# Patient Record
Sex: FEMALE | Race: WHITE | NOT HISPANIC OR LATINO
[De-identification: names, ages, dates, MRNs, and addresses within clinical notes are randomized per-mention and may not be internally consistent; named-entity substitution may affect disease eponyms.]

---

## 2022-11-12 ENCOUNTER — NON-APPOINTMENT (OUTPATIENT)
Age: 77
End: 2022-11-12

## 2022-11-13 ENCOUNTER — INPATIENT (INPATIENT)
Facility: HOSPITAL | Age: 77
LOS: 11 days | Discharge: NURSING, REHAB OR LONG-TERM CARE | End: 2022-11-25
Attending: HOSPITALIST | Admitting: HOSPITALIST

## 2022-11-13 VITALS
HEART RATE: 105 BPM | RESPIRATION RATE: 18 BRPM | DIASTOLIC BLOOD PRESSURE: 63 MMHG | OXYGEN SATURATION: 98 % | TEMPERATURE: 100 F | SYSTOLIC BLOOD PRESSURE: 134 MMHG

## 2022-11-13 PROCEDURE — 99285 EMERGENCY DEPT VISIT HI MDM: CPT

## 2022-11-13 PROCEDURE — 93010 ELECTROCARDIOGRAM REPORT: CPT

## 2022-11-13 NOTE — ED ADULT TRIAGE NOTE - CHIEF COMPLAINT QUOTE
Pt c/o generalized weakness x 2 days. Pt was seen at Urgent Care today, tested positive for COVID. Denies chest pain or SOB. PMHx HTN DM

## 2022-11-14 DIAGNOSIS — R06.02 SHORTNESS OF BREATH: ICD-10-CM

## 2022-11-14 LAB
ALBUMIN SERPL ELPH-MCNC: 3.8 G/DL — SIGNIFICANT CHANGE UP (ref 3.3–5)
ALBUMIN SERPL ELPH-MCNC: 4.1 G/DL — SIGNIFICANT CHANGE UP (ref 3.3–5)
ALP SERPL-CCNC: 100 U/L — SIGNIFICANT CHANGE UP (ref 40–120)
ALP SERPL-CCNC: 97 U/L — SIGNIFICANT CHANGE UP (ref 40–120)
ALT FLD-CCNC: 19 U/L — SIGNIFICANT CHANGE UP (ref 4–33)
ALT FLD-CCNC: 25 U/L — SIGNIFICANT CHANGE UP (ref 4–33)
ANION GAP SERPL CALC-SCNC: 13 MMOL/L — SIGNIFICANT CHANGE UP (ref 7–14)
ANION GAP SERPL CALC-SCNC: 16 MMOL/L — HIGH (ref 7–14)
APPEARANCE UR: ABNORMAL
APTT BLD: 25.2 SEC — LOW (ref 27–36.3)
APTT BLD: 39.9 SEC — HIGH (ref 27–36.3)
APTT BLD: 44.9 SEC — HIGH (ref 27–36.3)
AST SERPL-CCNC: 27 U/L — SIGNIFICANT CHANGE UP (ref 4–32)
AST SERPL-CCNC: 44 U/L — HIGH (ref 4–32)
BACTERIA # UR AUTO: ABNORMAL
BASOPHILS # BLD AUTO: 0.04 K/UL — SIGNIFICANT CHANGE UP (ref 0–0.2)
BASOPHILS NFR BLD AUTO: 0.3 % — SIGNIFICANT CHANGE UP (ref 0–2)
BILIRUB DIRECT SERPL-MCNC: <0.2 MG/DL — SIGNIFICANT CHANGE UP (ref 0–0.3)
BILIRUB INDIRECT FLD-MCNC: >0.1 MG/DL — SIGNIFICANT CHANGE UP (ref 0–1)
BILIRUB SERPL-MCNC: 0.3 MG/DL — SIGNIFICANT CHANGE UP (ref 0.2–1.2)
BILIRUB SERPL-MCNC: 0.3 MG/DL — SIGNIFICANT CHANGE UP (ref 0.2–1.2)
BILIRUB UR-MCNC: NEGATIVE — SIGNIFICANT CHANGE UP
BUN SERPL-MCNC: 20 MG/DL — SIGNIFICANT CHANGE UP (ref 7–23)
BUN SERPL-MCNC: 20 MG/DL — SIGNIFICANT CHANGE UP (ref 7–23)
CALCIUM SERPL-MCNC: 9.1 MG/DL — SIGNIFICANT CHANGE UP (ref 8.4–10.5)
CALCIUM SERPL-MCNC: 9.8 MG/DL — SIGNIFICANT CHANGE UP (ref 8.4–10.5)
CHLORIDE SERPL-SCNC: 100 MMOL/L — SIGNIFICANT CHANGE UP (ref 98–107)
CHLORIDE SERPL-SCNC: 101 MMOL/L — SIGNIFICANT CHANGE UP (ref 98–107)
CK MB BLD-MCNC: 1.4 % — SIGNIFICANT CHANGE UP (ref 0–2.5)
CK MB CFR SERPL CALC: 4.6 NG/ML — SIGNIFICANT CHANGE UP
CK SERPL-CCNC: 329 U/L — HIGH (ref 25–170)
CK SERPL-CCNC: 686 U/L — HIGH (ref 25–170)
CO2 SERPL-SCNC: 16 MMOL/L — LOW (ref 22–31)
CO2 SERPL-SCNC: 22 MMOL/L — SIGNIFICANT CHANGE UP (ref 22–31)
COLOR SPEC: YELLOW — SIGNIFICANT CHANGE UP
CREAT SERPL-MCNC: 1.17 MG/DL — SIGNIFICANT CHANGE UP (ref 0.5–1.3)
CREAT SERPL-MCNC: 1.22 MG/DL — SIGNIFICANT CHANGE UP (ref 0.5–1.3)
CREAT SERPL-MCNC: 1.37 MG/DL — HIGH (ref 0.5–1.3)
D DIMER BLD IA.RAPID-MCNC: 416 NG/ML DDU — HIGH
DIFF PNL FLD: ABNORMAL
EGFR: 40 ML/MIN/1.73M2 — LOW
EGFR: 46 ML/MIN/1.73M2 — LOW
EGFR: 49 ML/MIN/1.73M2 — LOW
EOSINOPHIL # BLD AUTO: 0 K/UL — SIGNIFICANT CHANGE UP (ref 0–0.5)
EOSINOPHIL NFR BLD AUTO: 0 % — SIGNIFICANT CHANGE UP (ref 0–6)
EPI CELLS # UR: 12 /HPF — HIGH (ref 0–5)
FLUAV AG NPH QL: SIGNIFICANT CHANGE UP
FLUBV AG NPH QL: SIGNIFICANT CHANGE UP
GLUCOSE SERPL-MCNC: 146 MG/DL — HIGH (ref 70–99)
GLUCOSE SERPL-MCNC: 311 MG/DL — HIGH (ref 70–99)
GLUCOSE UR QL: NEGATIVE — SIGNIFICANT CHANGE UP
HCT VFR BLD CALC: 32.1 % — LOW (ref 34.5–45)
HCT VFR BLD CALC: 34.7 % — SIGNIFICANT CHANGE UP (ref 34.5–45)
HGB BLD-MCNC: 10.4 G/DL — LOW (ref 11.5–15.5)
HGB BLD-MCNC: 10.9 G/DL — LOW (ref 11.5–15.5)
HYALINE CASTS # UR AUTO: 0 /LPF — SIGNIFICANT CHANGE UP (ref 0–7)
IANC: 9.35 K/UL — HIGH (ref 1.8–7.4)
IMM GRANULOCYTES NFR BLD AUTO: 0.5 % — SIGNIFICANT CHANGE UP (ref 0–0.9)
INR BLD: 1.22 RATIO — HIGH (ref 0.88–1.16)
INR BLD: 1.26 RATIO — HIGH (ref 0.88–1.16)
KETONES UR-MCNC: NEGATIVE — SIGNIFICANT CHANGE UP
LEUKOCYTE ESTERASE UR-ACNC: ABNORMAL
LYMPHOCYTES # BLD AUTO: 1.53 K/UL — SIGNIFICANT CHANGE UP (ref 1–3.3)
LYMPHOCYTES # BLD AUTO: 12.7 % — LOW (ref 13–44)
MAGNESIUM SERPL-MCNC: 1.6 MG/DL — SIGNIFICANT CHANGE UP (ref 1.6–2.6)
MCHC RBC-ENTMCNC: 25.5 PG — LOW (ref 27–34)
MCHC RBC-ENTMCNC: 25.6 PG — LOW (ref 27–34)
MCHC RBC-ENTMCNC: 31.4 GM/DL — LOW (ref 32–36)
MCHC RBC-ENTMCNC: 32.4 GM/DL — SIGNIFICANT CHANGE UP (ref 32–36)
MCV RBC AUTO: 78.9 FL — LOW (ref 80–100)
MCV RBC AUTO: 81.3 FL — SIGNIFICANT CHANGE UP (ref 80–100)
MONOCYTES # BLD AUTO: 1.05 K/UL — HIGH (ref 0–0.9)
MONOCYTES NFR BLD AUTO: 8.7 % — SIGNIFICANT CHANGE UP (ref 2–14)
NEUTROPHILS # BLD AUTO: 9.35 K/UL — HIGH (ref 1.8–7.4)
NEUTROPHILS NFR BLD AUTO: 77.8 % — HIGH (ref 43–77)
NITRITE UR-MCNC: NEGATIVE — SIGNIFICANT CHANGE UP
NRBC # BLD: 0 /100 WBCS — SIGNIFICANT CHANGE UP (ref 0–0)
NRBC # BLD: 0 /100 WBCS — SIGNIFICANT CHANGE UP (ref 0–0)
NRBC # FLD: 0 K/UL — SIGNIFICANT CHANGE UP (ref 0–0)
NRBC # FLD: 0 K/UL — SIGNIFICANT CHANGE UP (ref 0–0)
PH UR: 6 — SIGNIFICANT CHANGE UP (ref 5–8)
PHOSPHATE SERPL-MCNC: 3 MG/DL — SIGNIFICANT CHANGE UP (ref 2.5–4.5)
PLATELET # BLD AUTO: 243 K/UL — SIGNIFICANT CHANGE UP (ref 150–400)
PLATELET # BLD AUTO: 280 K/UL — SIGNIFICANT CHANGE UP (ref 150–400)
POTASSIUM SERPL-MCNC: 3.7 MMOL/L — SIGNIFICANT CHANGE UP (ref 3.5–5.3)
POTASSIUM SERPL-MCNC: 3.9 MMOL/L — SIGNIFICANT CHANGE UP (ref 3.5–5.3)
POTASSIUM SERPL-SCNC: 3.7 MMOL/L — SIGNIFICANT CHANGE UP (ref 3.5–5.3)
POTASSIUM SERPL-SCNC: 3.9 MMOL/L — SIGNIFICANT CHANGE UP (ref 3.5–5.3)
PROT SERPL-MCNC: 7.6 G/DL — SIGNIFICANT CHANGE UP (ref 6–8.3)
PROT SERPL-MCNC: 7.7 G/DL — SIGNIFICANT CHANGE UP (ref 6–8.3)
PROT UR-MCNC: ABNORMAL
PROTHROM AB SERPL-ACNC: 14.2 SEC — HIGH (ref 10.5–13.4)
PROTHROM AB SERPL-ACNC: 14.6 SEC — HIGH (ref 10.5–13.4)
RBC # BLD: 4.07 M/UL — SIGNIFICANT CHANGE UP (ref 3.8–5.2)
RBC # BLD: 4.27 M/UL — SIGNIFICANT CHANGE UP (ref 3.8–5.2)
RBC # FLD: 16.6 % — HIGH (ref 10.3–14.5)
RBC # FLD: 17 % — HIGH (ref 10.3–14.5)
RBC CASTS # UR COMP ASSIST: 10 /HPF — HIGH (ref 0–4)
RSV RNA NPH QL NAA+NON-PROBE: SIGNIFICANT CHANGE UP
SARS-COV-2 RNA SPEC QL NAA+PROBE: DETECTED
SODIUM SERPL-SCNC: 133 MMOL/L — LOW (ref 135–145)
SODIUM SERPL-SCNC: 135 MMOL/L — SIGNIFICANT CHANGE UP (ref 135–145)
SP GR SPEC: 1.02 — SIGNIFICANT CHANGE UP (ref 1.01–1.05)
TROPONIN T, HIGH SENSITIVITY RESULT: 143 NG/L — CRITICAL HIGH
TROPONIN T, HIGH SENSITIVITY RESULT: 169 NG/L — CRITICAL HIGH
TROPONIN T, HIGH SENSITIVITY RESULT: 173 NG/L — CRITICAL HIGH
UROBILINOGEN FLD QL: SIGNIFICANT CHANGE UP
WBC # BLD: 10.47 K/UL — SIGNIFICANT CHANGE UP (ref 3.8–10.5)
WBC # BLD: 12.03 K/UL — HIGH (ref 3.8–10.5)
WBC # FLD AUTO: 10.47 K/UL — SIGNIFICANT CHANGE UP (ref 3.8–10.5)
WBC # FLD AUTO: 12.03 K/UL — HIGH (ref 3.8–10.5)
WBC UR QL: 30 /HPF — HIGH (ref 0–5)

## 2022-11-14 PROCEDURE — 99223 1ST HOSP IP/OBS HIGH 75: CPT

## 2022-11-14 PROCEDURE — 71045 X-RAY EXAM CHEST 1 VIEW: CPT | Mod: 26

## 2022-11-14 RX ORDER — REMDESIVIR 5 MG/ML
INJECTION INTRAVENOUS
Refills: 0 | Status: COMPLETED | OUTPATIENT
Start: 2022-11-14 | End: 2022-11-18

## 2022-11-14 RX ORDER — SODIUM CHLORIDE 9 MG/ML
1000 INJECTION, SOLUTION INTRAVENOUS
Refills: 0 | Status: DISCONTINUED | OUTPATIENT
Start: 2022-11-14 | End: 2022-11-25

## 2022-11-14 RX ORDER — ONDANSETRON 8 MG/1
4 TABLET, FILM COATED ORAL EVERY 8 HOURS
Refills: 0 | Status: DISCONTINUED | OUTPATIENT
Start: 2022-11-14 | End: 2022-11-25

## 2022-11-14 RX ORDER — HEPARIN SODIUM 5000 [USP'U]/ML
INJECTION INTRAVENOUS; SUBCUTANEOUS
Qty: 25000 | Refills: 0 | Status: DISCONTINUED | OUTPATIENT
Start: 2022-11-14 | End: 2022-11-15

## 2022-11-14 RX ORDER — INSULIN LISPRO 100/ML
VIAL (ML) SUBCUTANEOUS
Refills: 0 | Status: DISCONTINUED | OUTPATIENT
Start: 2022-11-14 | End: 2022-11-25

## 2022-11-14 RX ORDER — GLUCAGON INJECTION, SOLUTION 0.5 MG/.1ML
1 INJECTION, SOLUTION SUBCUTANEOUS ONCE
Refills: 0 | Status: DISCONTINUED | OUTPATIENT
Start: 2022-11-14 | End: 2022-11-25

## 2022-11-14 RX ORDER — INSULIN GLARGINE 100 [IU]/ML
5 INJECTION, SOLUTION SUBCUTANEOUS AT BEDTIME
Refills: 0 | Status: DISCONTINUED | OUTPATIENT
Start: 2022-11-14 | End: 2022-11-17

## 2022-11-14 RX ORDER — SODIUM CHLORIDE 9 MG/ML
1000 INJECTION INTRAMUSCULAR; INTRAVENOUS; SUBCUTANEOUS ONCE
Refills: 0 | Status: COMPLETED | OUTPATIENT
Start: 2022-11-14 | End: 2022-11-14

## 2022-11-14 RX ORDER — REMDESIVIR 5 MG/ML
200 INJECTION INTRAVENOUS EVERY 24 HOURS
Refills: 0 | Status: COMPLETED | OUTPATIENT
Start: 2022-11-14 | End: 2022-11-14

## 2022-11-14 RX ORDER — LANOLIN ALCOHOL/MO/W.PET/CERES
3 CREAM (GRAM) TOPICAL AT BEDTIME
Refills: 0 | Status: DISCONTINUED | OUTPATIENT
Start: 2022-11-14 | End: 2022-11-25

## 2022-11-14 RX ORDER — CHOLECALCIFEROL (VITAMIN D3) 125 MCG
1000 CAPSULE ORAL DAILY
Refills: 0 | Status: DISCONTINUED | OUTPATIENT
Start: 2022-11-14 | End: 2022-11-25

## 2022-11-14 RX ORDER — CHOLECALCIFEROL (VITAMIN D3) 125 MCG
1 CAPSULE ORAL
Qty: 0 | Refills: 0 | DISCHARGE

## 2022-11-14 RX ORDER — TRAZODONE HCL 50 MG
1 TABLET ORAL
Qty: 0 | Refills: 0 | DISCHARGE

## 2022-11-14 RX ORDER — HEPARIN SODIUM 5000 [USP'U]/ML
6000 INJECTION INTRAVENOUS; SUBCUTANEOUS EVERY 6 HOURS
Refills: 0 | Status: DISCONTINUED | OUTPATIENT
Start: 2022-11-14 | End: 2022-11-15

## 2022-11-14 RX ORDER — DEXTROSE 50 % IN WATER 50 %
25 SYRINGE (ML) INTRAVENOUS ONCE
Refills: 0 | Status: DISCONTINUED | OUTPATIENT
Start: 2022-11-14 | End: 2022-11-25

## 2022-11-14 RX ORDER — TRAZODONE HCL 50 MG
150 TABLET ORAL AT BEDTIME
Refills: 0 | Status: DISCONTINUED | OUTPATIENT
Start: 2022-11-14 | End: 2022-11-25

## 2022-11-14 RX ORDER — ACETAMINOPHEN 500 MG
650 TABLET ORAL ONCE
Refills: 0 | Status: COMPLETED | OUTPATIENT
Start: 2022-11-14 | End: 2022-11-14

## 2022-11-14 RX ORDER — ASPIRIN/CALCIUM CARB/MAGNESIUM 324 MG
81 TABLET ORAL DAILY
Refills: 0 | Status: DISCONTINUED | OUTPATIENT
Start: 2022-11-14 | End: 2022-11-25

## 2022-11-14 RX ORDER — ACETAMINOPHEN 500 MG
650 TABLET ORAL EVERY 6 HOURS
Refills: 0 | Status: DISCONTINUED | OUTPATIENT
Start: 2022-11-14 | End: 2022-11-25

## 2022-11-14 RX ORDER — REMDESIVIR 5 MG/ML
100 INJECTION INTRAVENOUS EVERY 24 HOURS
Refills: 0 | Status: COMPLETED | OUTPATIENT
Start: 2022-11-15 | End: 2022-11-18

## 2022-11-14 RX ORDER — TICAGRELOR 90 MG/1
180 TABLET ORAL ONCE
Refills: 0 | Status: COMPLETED | OUTPATIENT
Start: 2022-11-14 | End: 2022-11-14

## 2022-11-14 RX ORDER — DEXTROSE 50 % IN WATER 50 %
15 SYRINGE (ML) INTRAVENOUS ONCE
Refills: 0 | Status: DISCONTINUED | OUTPATIENT
Start: 2022-11-14 | End: 2022-11-25

## 2022-11-14 RX ORDER — ASPIRIN/CALCIUM CARB/MAGNESIUM 324 MG
1 TABLET ORAL
Qty: 0 | Refills: 0 | DISCHARGE

## 2022-11-14 RX ORDER — METFORMIN HYDROCHLORIDE 850 MG/1
1 TABLET ORAL
Qty: 0 | Refills: 0 | DISCHARGE

## 2022-11-14 RX ORDER — DULAGLUTIDE 4.5 MG/.5ML
1 INJECTION, SOLUTION SUBCUTANEOUS
Qty: 0 | Refills: 0 | DISCHARGE

## 2022-11-14 RX ORDER — HEPARIN SODIUM 5000 [USP'U]/ML
5000 INJECTION INTRAVENOUS; SUBCUTANEOUS ONCE
Refills: 0 | Status: COMPLETED | OUTPATIENT
Start: 2022-11-14 | End: 2022-11-14

## 2022-11-14 RX ORDER — ALBUTEROL 90 UG/1
2.5 AEROSOL, METERED ORAL ONCE
Refills: 0 | Status: COMPLETED | OUTPATIENT
Start: 2022-11-14 | End: 2022-11-14

## 2022-11-14 RX ORDER — ACETAMINOPHEN 500 MG
1000 TABLET ORAL ONCE
Refills: 0 | Status: COMPLETED | OUTPATIENT
Start: 2022-11-14 | End: 2022-11-14

## 2022-11-14 RX ORDER — CEFTRIAXONE 500 MG/1
1000 INJECTION, POWDER, FOR SOLUTION INTRAMUSCULAR; INTRAVENOUS ONCE
Refills: 0 | Status: COMPLETED | OUTPATIENT
Start: 2022-11-14 | End: 2022-11-14

## 2022-11-14 RX ORDER — DEXAMETHASONE 0.5 MG/5ML
6 ELIXIR ORAL DAILY
Refills: 0 | Status: DISCONTINUED | OUTPATIENT
Start: 2022-11-14 | End: 2022-11-14

## 2022-11-14 RX ORDER — DEXAMETHASONE 0.5 MG/5ML
6 ELIXIR ORAL DAILY
Refills: 0 | Status: COMPLETED | OUTPATIENT
Start: 2022-11-15 | End: 2022-11-24

## 2022-11-14 RX ORDER — DEXTROSE 50 % IN WATER 50 %
12.5 SYRINGE (ML) INTRAVENOUS ONCE
Refills: 0 | Status: DISCONTINUED | OUTPATIENT
Start: 2022-11-14 | End: 2022-11-25

## 2022-11-14 RX ORDER — GLIMEPIRIDE 1 MG
1 TABLET ORAL
Qty: 0 | Refills: 0 | DISCHARGE

## 2022-11-14 RX ORDER — IPRATROPIUM/ALBUTEROL SULFATE 18-103MCG
3 AEROSOL WITH ADAPTER (GRAM) INHALATION EVERY 6 HOURS
Refills: 0 | Status: DISCONTINUED | OUTPATIENT
Start: 2022-11-14 | End: 2022-11-16

## 2022-11-14 RX ORDER — ASPIRIN/CALCIUM CARB/MAGNESIUM 324 MG
324 TABLET ORAL ONCE
Refills: 0 | Status: COMPLETED | OUTPATIENT
Start: 2022-11-14 | End: 2022-11-14

## 2022-11-14 RX ADMIN — SODIUM CHLORIDE 1000 MILLILITER(S): 9 INJECTION INTRAMUSCULAR; INTRAVENOUS; SUBCUTANEOUS at 00:33

## 2022-11-14 RX ADMIN — Medication 100 MILLIGRAM(S): at 00:33

## 2022-11-14 RX ADMIN — ALBUTEROL 2.5 MILLIGRAM(S): 90 AEROSOL, METERED ORAL at 00:33

## 2022-11-14 RX ADMIN — Medication 3 MILLILITER(S): at 18:25

## 2022-11-14 RX ADMIN — HEPARIN SODIUM 1400 UNIT(S)/HR: 5000 INJECTION INTRAVENOUS; SUBCUTANEOUS at 19:35

## 2022-11-14 RX ADMIN — Medication 650 MILLIGRAM(S): at 00:33

## 2022-11-14 RX ADMIN — CEFTRIAXONE 100 MILLIGRAM(S): 500 INJECTION, POWDER, FOR SOLUTION INTRAMUSCULAR; INTRAVENOUS at 05:42

## 2022-11-14 RX ADMIN — HEPARIN SODIUM 5000 UNIT(S): 5000 INJECTION INTRAVENOUS; SUBCUTANEOUS at 05:38

## 2022-11-14 RX ADMIN — REMDESIVIR 200 MILLIGRAM(S): 5 INJECTION INTRAVENOUS at 14:23

## 2022-11-14 RX ADMIN — HEPARIN SODIUM 1000 UNIT(S)/HR: 5000 INJECTION INTRAVENOUS; SUBCUTANEOUS at 05:40

## 2022-11-14 RX ADMIN — Medication 6 MILLIGRAM(S): at 11:56

## 2022-11-14 RX ADMIN — TICAGRELOR 180 MILLIGRAM(S): 90 TABLET ORAL at 05:38

## 2022-11-14 RX ADMIN — Medication 3: at 18:23

## 2022-11-14 RX ADMIN — Medication 324 MILLIGRAM(S): at 03:10

## 2022-11-14 RX ADMIN — Medication 81 MILLIGRAM(S): at 11:56

## 2022-11-14 RX ADMIN — Medication 150 MILLIGRAM(S): at 22:25

## 2022-11-14 RX ADMIN — Medication 1200 MILLIGRAM(S): at 19:33

## 2022-11-14 RX ADMIN — INSULIN GLARGINE 5 UNIT(S): 100 INJECTION, SOLUTION SUBCUTANEOUS at 22:24

## 2022-11-14 RX ADMIN — Medication 400 MILLIGRAM(S): at 09:18

## 2022-11-14 RX ADMIN — HEPARIN SODIUM 1200 UNIT(S)/HR: 5000 INJECTION INTRAVENOUS; SUBCUTANEOUS at 12:16

## 2022-11-14 RX ADMIN — Medication 1000 UNIT(S): at 11:56

## 2022-11-14 RX ADMIN — Medication 2: at 12:44

## 2022-11-14 RX ADMIN — Medication 1200 MILLIGRAM(S): at 02:45

## 2022-11-14 RX ADMIN — Medication 650 MILLIGRAM(S): at 04:40

## 2022-11-14 NOTE — ED PROVIDER NOTE - OBJECTIVE STATEMENT
76 yo F pmhx htn hld dm2 p/w 2 days of fevers sore throat non productive cough a/w cp and sob. pt has multiple sick contacts at home flu+ . pt reporting chest pain middle of chest intermittent worse with coughing non exertional non pleuritic. pt having nasal congestion. took tylenol with no relief. went to  today + covid test. pt is vaccinated. pt reports drinking lots of fluid and urinary frequency. no abd pain n/v dysuria.

## 2022-11-14 NOTE — CONSULT NOTE ADULT - SUBJECTIVE AND OBJECTIVE BOX
[facility], Division of Infectious Diseases  SHOSHANA Chang, AWA Wilkinson, JEREMIAH Zambrano Michael   [phone]  after hours and weekends [phone]    YOSELYN, CECELIA  77y, Female  [id number]      HPI:  77 year old female, PMH of covid 19 4/2020, HTN, HLD, NIDDM, former smoker, quit 15 years ago, vaccinated for covid and boosted in the summer, presents to ER with symptoms of dizziness and weak almost fell down. her  had the flu and she started to feel congested, coughing and mild sob.   Per pt, she was in usual state of health up until  2 days ago. Last night, patient was taken to  and tested positive for Covid. She was told EKG was abnormal so she came to ED for evaluation.   pt has no current reports of chest pain but endorses weakness and feels like she may pass out, no syncopal episodes.      PMH/PSH--  diabetes  copd  hypertension      Allergies--  nkda    Medications--  Antibiotics: remdesivir  IVPB   IV Intermittent   remdesivir  IVPB 200 milliGRAM(s) IV Intermittent every 24 hours    Immunologic:   Other: acetaminophen     Tablet .. PRN  albuterol/ipratropium for Nebulization  aluminum hydroxide/magnesium hydroxide/simethicone Suspension PRN  aspirin enteric coated  cholecalciferol  dextrose 5%.  dextrose 5%.  dextrose 50% Injectable  dextrose 50% Injectable  dextrose 50% Injectable  dextrose Oral Gel PRN  glucagon  Injectable  guaiFENesin ER  heparin   Injectable PRN  heparin  Infusion.  insulin glargine Injectable (LANTUS)  insulin lispro (ADMELOG) corrective regimen sliding scale  melatonin PRN  ondansetron Injectable PRN  traZODone      Social History--  EtOH: denies ***  Tobacco: former   Drug Use: denies ***    Family/Marital History--            Travel/Environmental/Occupational History:  retired     Review of Systems:  REVIEW OF SYSTEMS  General: no fever, no chills, no wt loss	  Ophthalmologic: no blurry vision  Respiratory and Thorax: + cough, + dyspnea  Cardiovascular: no chest pain, no palpitations  Gastrointestinal:  no nausea, no vomiting, diarrhea  Genitourinary: no dysuria, no urgency, no frequency	  Musculoskeletal: no myalgias	  Neurological:  no headache	    Physical Exam--  Vital Signs: T(F): 98.9 (11-14-22 @ 11:37), Max: 99.9 (11-13-22 @ 23:03)  HR: 95 (11-14-22 @ 11:37)  BP: 136/63 (11-14-22 @ 11:37)  RR: 19 (11-14-22 @ 11:37)  SpO2: 99% (11-14-22 @ 11:37)  Wt(kg): --  General: Nontoxic-appearing Female in no acute distress.  HEENT: AT/NC.   Neck: Not rigid. No sense of mass.  Nodes: None palpable.  Lungs: Clear bilaterally without rales, wheezing or rhonchi  Heart: Regular rate and rhythm. No Murmur.  Abdomen: Bowel sounds present and normoactive. Soft. Nondistended. Nontender.   Extremities: No cyanosis or clubbing. trace edema.   Skin: Warm. Dry. Good turgor. No rash. No vasculitic stigmata.  Psychiatric: Appropriate affect and mood for situation.         Laboratory & Imaging Data--  CBC                        10.4   10.47 )-----------( 243      ( 14 Nov 2022 11:37 )             32.1       Chemistries  11-14    133<L>  |  101  |  20  ----------------------------<  311<H>  3.7   |  16<L>  |  1.22    Ca    9.1      14 Nov 2022 11:37  Phos  3.0     11-14  Mg     1.60     11-14    TPro  7.7  /  Alb  4.1  /  TBili  0.3  /  DBili  x   /  AST  27  /  ALT  19  /  AlkPhos  100  11-14      Culture Data    < from: Xray Chest 1 View- PORTABLE-Urgent (Xray Chest 1 View- PORTABLE-Urgent .) (11.14.22 @ 01:17) >    ACC: [id number] EXAM:  XR CHEST PORTABLE URGENT 1V                          PROCEDURE DATE:  11/14/2022          INTERPRETATION:  CLINICAL INFORMATION: Chest pain, cough.    TECHNIQUE: Single portable view of the chest.    COMPARISON: None available..    FINDINGS:    No focal consolidation.  No pneumothorax or large pleural effusion.  Heart size cannot be accurately assessed in this projection.  No acute osseous abnormality.    IMPRESSION:    No focal consolidation.    < end of copied text >

## 2022-11-14 NOTE — ED PROVIDER NOTE - CLINICAL SUMMARY MEDICAL DECISION MAKING FREE TEXT BOX
76 yo F pmhx htn hld dm2 p/w 2 days of fevers sore throat non productive cough a/w cp and sob. pt has multiple sick contacts at home flu+ . pt reporting chest pain middle of chest intermittent worse with coughing non exertional non pleuritic. pt having nasal congestion. took tylenol with no relief. went to  today + covid test. pt is vaccinated. pt reports drinking lots of fluid and urinary frequency. no abd pain n/v dysuria. VSS. benign exam lungs ctab. c/f uri vs covid vs uti. lower c/f for acs vs pna. plan labs ekg cxr ua meds and r/a

## 2022-11-14 NOTE — CONSULT NOTE ADULT - SUBJECTIVE AND OBJECTIVE BOX
Date of Admission:  11/13/22  CHIEF COMPLAINT:  feels weak since Saturday, tested positive for covid on Saturday  HISTORY OF PRESENT ILLNESS:  Patient is a 77 year old female, PMH of HTN, HLD, NIDDM, former smoker, quit 15 years ago, vaccinated for covid, presents to ER with symptoms of shortness of breath, cough, congestion, nausea, no vomitting, loose stool. Per pt, she was in usual state of health up until  2 days ago. Last night, patient was taken to  and tested positive for Covid. She was told EKG was abnormal so she came to ED for evaluation. Pt has no current reports of chest pain but endorses weakness and feels like she may pass out, no syncopal episodes. In ER, EKG with no signs of ACS. Cardiac enzymes and troponin are positive. Troponin 169, delta 173. CPK mildly elevated to 329, CKMB 4.6 index is 1.4.   Patient has multiple sick contacts at home,  is flu positive.    Cardiology consulted for possible NSTEMI in setting of covid.      Allergies  HMG-CoA reductase inhibitors (Unknown)    MEDICATIONS:  guaiFENesin ER 1200 milliGRAM(s) Oral every 12 hours    PAST MEDICAL & SURGICAL HISTORY:  HTN  HLD  NIDDM    FAMILY HISTORY:      SOCIAL HISTORY:    Former smoker, quit 1 yrs ago  [ ] Smoker  [ ] Alcohol    REVIEW OF SYSTEMS:    CONSTITUTIONAL: endorses weakness and dizziness  EYES/ENT: No visual changes;  No vertigo or throat pain   NECK: No pain or stiffness  RESPIRATORY:endorses cough and shortness of breath  CARDIOVASCULAR: No chest pain or palpitations  GASTROINTESTINAL: No abdominal or epigastric pain. No nausea, vomiting, or hematemesis; No diarrhea or constipation. No melena or hematochezia.  GENITOURINARY: No dysuria, frequency or hematuria  NEUROLOGICAL: No numbness or weakness  SKIN: No itching, rashes        T(C): 37.1 (11-14-22 @ 00:44), Max: 37.7 (11-13-22 @ 23:03)  HR: 103 (11-14-22 @ 00:44) (103 - 105)  BP: 141/73 (11-14-22 @ 00:44) (134/63 - 141/73)  RR: 16 (11-14-22 @ 00:44) (16 - 18)  SpO2: 100% (11-14-22 @ 00:44) (98% - 100%)  Wt(kg): --  I&O's Summary      Physical Exam:  General: NAD  Cardiovascular: Normal S1 S2, No JVD, No murmurs, No edema  Respiratory: Lungs clear to auscultation	  Gastrointestinal:  Soft, Non-tender, + BS	  Skin: warm and dry, No rashes, No ecchymoses, No cyanosis	  Extremities:  No clubbing, cyanosis or edema  Vascular: Peripheral pulses palpable 2+ bilaterally    LABS:	   	    CBC Full  -  ( 14 Nov 2022 00:35 )  WBC Count : 12.03 K/uL  Hemoglobin : 10.9 g/dL  Hematocrit : 34.7 %  Platelet Count - Automated : 280 K/uL  Mean Cell Volume : 81.3 fL  Mean Cell Hemoglobin : 25.5 pg  Mean Cell Hemoglobin Concentration : 31.4 gm/dL  Auto Neutrophil # : 9.35 K/uL  Auto Lymphocyte # : 1.53 K/uL  Auto Monocyte # : 1.05 K/uL  Auto Eosinophil # : 0.00 K/uL  Auto Basophil # : 0.04 K/uL  Auto Neutrophil % : 77.8 %  Auto Lymphocyte % : 12.7 %  Auto Monocyte % : 8.7 %  Auto Eosinophil % : 0.0 %  Auto Basophil % : 0.3 %    11-14    135  |  100  |  20  ----------------------------<  146<H>  3.9   |  22  |  1.37<H>    Ca    9.8      14 Nov 2022 00:35    TPro  7.7  /  Alb  4.1  /  TBili  0.3  /  DBili  x   /  AST  27  /  ALT  19  /  AlkPhos  100  11-14    DATE/TIME: 11/14 00:35  TROPONIN: 169  CPK   CKMB     DATE/TIME: 11/14 3:02  Troponin:  173  CPK: 329  CKMB: 4.6  Index: 1.4   Date of Admission:  11/13/22  CHIEF COMPLAINT:  feels weak since Saturday, tested positive for covid on Saturday  HISTORY OF PRESENT ILLNESS:  Patient is a 77 year old female, PMH of HTN, HLD, NIDDM, former smoker, quit 15 years ago, vaccinated for covid, presents to ER with symptoms of shortness of breath, cough, congestion, nausea, no vomitting, loose stool. Per pt, she was in usual state of health up until  2 days ago. Last night, patient was taken to  and tested positive for Covid. She was told EKG was abnormal so she came to ED for evaluation. Pt has no current reports of chest pain but endorses weakness and feels like she may pass out, no syncopal episodes. In ER, EKG with no signs of ACS. Cardiac enzymes and troponin are positive. Troponin 169, delta 173. CPK mildly elevated to 329, CKMB 4.6 index is 1.4.   Patient has multiple sick contacts at home,  is flu positive.    Cardiology consulted for possible NSTEMI in setting of covid.      Allergies  HMG-CoA reductase inhibitors (Unknown)    MEDICATIONS:  guaiFENesin ER 1200 milliGRAM(s) Oral every 12 hours  HOME MEDICATIONS:  aspirin 81mg po qd  trulicity  trazadone  metformin  glimperide  pravastatin  lisinopril    PAST MEDICAL & SURGICAL HISTORY:  HTN  HLD  NIDDM    FAMILY HISTORY:      SOCIAL HISTORY:    Former smoker, quit 1 yrs ago  [ ] Smoker  [ ] Alcohol    REVIEW OF SYSTEMS:    CONSTITUTIONAL: endorses weakness and dizziness  EYES/ENT: No visual changes;  No vertigo or throat pain   NECK: No pain or stiffness  RESPIRATORY:endorses cough and shortness of breath  CARDIOVASCULAR: No chest pain or palpitations  GASTROINTESTINAL: No abdominal or epigastric pain. No nausea, vomiting, or hematemesis; No diarrhea or constipation. No melena or hematochezia.  GENITOURINARY: No dysuria, frequency or hematuria  NEUROLOGICAL: No numbness or weakness  SKIN: No itching, rashes        T(C): 37.1 (11-14-22 @ 00:44), Max: 37.7 (11-13-22 @ 23:03)  HR: 103 (11-14-22 @ 00:44) (103 - 105)  BP: 141/73 (11-14-22 @ 00:44) (134/63 - 141/73)  RR: 16 (11-14-22 @ 00:44) (16 - 18)  SpO2: 100% (11-14-22 @ 00:44) (98% - 100%)  Wt(kg): --  I&O's Summary      Physical Exam:  General: NAD  Cardiovascular: Normal S1 S2, No JVD, No murmurs, No edema  Respiratory: Lungs clear to auscultation	  Gastrointestinal:  Soft, Non-tender, + BS	  Skin: warm and dry, No rashes, No ecchymoses, No cyanosis	  Extremities:  No clubbing, cyanosis or edema  Vascular: Peripheral pulses palpable 2+ bilaterally    LABS:	   	    CBC Full  -  ( 14 Nov 2022 00:35 )  WBC Count : 12.03 K/uL  Hemoglobin : 10.9 g/dL  Hematocrit : 34.7 %  Platelet Count - Automated : 280 K/uL  Mean Cell Volume : 81.3 fL  Mean Cell Hemoglobin : 25.5 pg  Mean Cell Hemoglobin Concentration : 31.4 gm/dL  Auto Neutrophil # : 9.35 K/uL  Auto Lymphocyte # : 1.53 K/uL  Auto Monocyte # : 1.05 K/uL  Auto Eosinophil # : 0.00 K/uL  Auto Basophil # : 0.04 K/uL  Auto Neutrophil % : 77.8 %  Auto Lymphocyte % : 12.7 %  Auto Monocyte % : 8.7 %  Auto Eosinophil % : 0.0 %  Auto Basophil % : 0.3 %    11-14    135  |  100  |  20  ----------------------------<  146<H>  3.9   |  22  |  1.37<H>    Ca    9.8      14 Nov 2022 00:35    TPro  7.7  /  Alb  4.1  /  TBili  0.3  /  DBili  x   /  AST  27  /  ALT  19  /  AlkPhos  100  11-14    DATE/TIME: 11/14 00:35  TROPONIN: 169  CPK   CKMB     DATE/TIME: 11/14 3:02  Troponin:  173  CPK: 329  CKMB: 4.6  Index: 1.4   Date of Admission:  11/13/22  CHIEF COMPLAINT:  feels weak since Saturday, tested positive for covid on Sunday  HISTORY OF PRESENT ILLNESS:  Patient is a 77 year old female, PMH of HTN, HLD, NIDDM, former smoker, quit 15 years ago, vaccinated for covid, presents to ER with symptoms of shortness of breath, cough, congestion, nausea, no vomitting, loose stool. Per pt, she was in usual state of health up until  2 days ago. Last night, patient was taken to  and tested positive for Covid. She was told EKG was abnormal so she came to ED for evaluation. Pt has no current reports of chest pain but endorses weakness and feels like she may pass out, no syncopal episodes. In ER, EKG with no signs of ACS. Cardiac enzymes and troponin are positive. Troponin 169, delta 173. CPK mildly elevated to 329, CKMB 4.6 index is 1.4.   Patient has multiple sick contacts at home,  is flu positive.    Cardiology consulted for possible NSTEMI in setting of covid.      Allergies  HMG-CoA reductase inhibitors (Unknown)    MEDICATIONS:  guaiFENesin ER 1200 milliGRAM(s) Oral every 12 hours  HOME MEDICATIONS:  aspirin 81mg po qd  trulicity  trazadone  metformin  glimperide  pravastatin  lisinopril    PAST MEDICAL & SURGICAL HISTORY:  HTN  HLD  NIDDM    FAMILY HISTORY:      SOCIAL HISTORY:    Former smoker, quit 1 yrs ago  [ ] Smoker  [ ] Alcohol    REVIEW OF SYSTEMS:    CONSTITUTIONAL: endorses weakness and dizziness  EYES/ENT: No visual changes;  No vertigo or throat pain   NECK: No pain or stiffness  RESPIRATORY:endorses cough and shortness of breath  CARDIOVASCULAR: No chest pain or palpitations  GASTROINTESTINAL: No abdominal or epigastric pain. No nausea, vomiting, or hematemesis; No diarrhea or constipation. No melena or hematochezia.  GENITOURINARY: No dysuria, frequency or hematuria  NEUROLOGICAL: No numbness or weakness  SKIN: No itching, rashes        T(C): 37.1 (11-14-22 @ 00:44), Max: 37.7 (11-13-22 @ 23:03)  HR: 103 (11-14-22 @ 00:44) (103 - 105)  BP: 141/73 (11-14-22 @ 00:44) (134/63 - 141/73)  RR: 16 (11-14-22 @ 00:44) (16 - 18)  SpO2: 100% (11-14-22 @ 00:44) (98% - 100%)  Wt(kg): --  I&O's Summary      Physical Exam:  General: NAD  Cardiovascular: Normal S1 S2, No JVD, No murmurs, No edema  Respiratory: Lungs clear to auscultation	  Gastrointestinal:  Soft, Non-tender, + BS	  Skin: warm and dry, No rashes, No ecchymoses, No cyanosis	  Extremities:  No clubbing, cyanosis or edema  Vascular: Peripheral pulses palpable 2+ bilaterally    LABS:	   	    CBC Full  -  ( 14 Nov 2022 00:35 )  WBC Count : 12.03 K/uL  Hemoglobin : 10.9 g/dL  Hematocrit : 34.7 %  Platelet Count - Automated : 280 K/uL  Mean Cell Volume : 81.3 fL  Mean Cell Hemoglobin : 25.5 pg  Mean Cell Hemoglobin Concentration : 31.4 gm/dL  Auto Neutrophil # : 9.35 K/uL  Auto Lymphocyte # : 1.53 K/uL  Auto Monocyte # : 1.05 K/uL  Auto Eosinophil # : 0.00 K/uL  Auto Basophil # : 0.04 K/uL  Auto Neutrophil % : 77.8 %  Auto Lymphocyte % : 12.7 %  Auto Monocyte % : 8.7 %  Auto Eosinophil % : 0.0 %  Auto Basophil % : 0.3 %    11-14    135  |  100  |  20  ----------------------------<  146<H>  3.9   |  22  |  1.37<H>    Ca    9.8      14 Nov 2022 00:35    TPro  7.7  /  Alb  4.1  /  TBili  0.3  /  DBili  x   /  AST  27  /  ALT  19  /  AlkPhos  100  11-14    DATE/TIME: 11/14 00:35  TROPONIN: 169  CPK   CKMB     DATE/TIME: 11/14 3:02  Troponin:  173  CPK: 329  CKMB: 4.6  Index: 1.4

## 2022-11-14 NOTE — ED PROVIDER NOTE - PROGRESS NOTE DETAILS
Treva PUENTE PGY2: elevated trop no ekg changes evaluated by cardiology recommends asa Brilinta and heparin and med admission with tele. spoke to [facility] who will admit pt.

## 2022-11-14 NOTE — ED ADULT NURSE REASSESSMENT NOTE - NS ED NURSE REASSESS COMMENT FT1
Report received from PM RN. Pt A&Ox4, ambulatory at baseline. Respirations equal and unlabored. Pt O2 sat 99% on RA.Pt states she is feeling weak and has chills. Pt afebrile. Pt denies CP, SOB, palpitations, N/V/D, any other medical complaints. Neuro intact. PERRLA. +2 pulses radial and pedal. Pt Sinus tachycardic on cardiac monitor. HR 115Abdomen soft, nontender, nondistended. Skin dry and intact. Pending inpatient bed assignment. Will continue to monitor.

## 2022-11-14 NOTE — CONSULT NOTE ADULT - ASSESSMENT
77 year old female, PMH of covid 19 4/2020, HTN, HLD, NIDDM, former smoker, quit 15 years ago, vaccinated for covid and boosted in the summer, presents to ER with symptoms of dizziness and weak almost fell down  covid 19  elevated troponin    plan  cxr no focal consolidation   pt at risk for progression of disease  remdesivir 3-5 day course trend lft  steroids not indicated as pt not hypoxic and still in first week of illness  ac heparin  antibx not indicated  trend biomarkers      ua with mild pyuria but also many epithelial cells >> contaminated specimen  hold off on antibx     cardiac monitoring

## 2022-11-14 NOTE — CONSULT NOTE ADULT - ASSESSMENT
Patient is a 77 year old female, PMH of HTN, HLD, NIDDM, former smoker, quit 15 years ago, covid +, now with EKG changes and elevated troponin and mildly elevated CPK, concern for NSTEMI.    PLAN:   Patient is a 77 year old female, PMH of HTN, HLD, NIDDM, former smoker, quit 15 years ago, covid +, now with EKG changes and elevated troponin and mildly elevated CPK, concern for NSTEMI.      IMPRESSION: NSTEMI in setting of covid    PLAN:  ELEVATED TROPONIN:  There are 3 EKGS in the chart, all 3 EKGs reviewed. EKGs do not suggest an acute plaque rupture, and are normal sinus, no ST elevations  -cardiac enzymes are positive  -patient does endorse midsternal chest pain with cough, likely from covid, but cannot rule out ACS  -aspirin 325 x 1 given  -please give brilinta load of 180mg  -initiate heparin gtt for ACS  -serial cardiac enzymes  -there is no indication for urgent left heart cardiac catheterization  -CXR shows mild pulmonary congestion, be cautious with IVF please  -ECHO in am   -added a serum pro BNP   -further cardiac work up to be determined  -this plan has been discussed with Dr. Campa, cardiology fellow  -cardiology will continue to follow    BLOOD PRESSURE:  BP is normal at this time  -please hold am dose of lisinopril in setting of mildly elevated creatinine of 1.37  -continue to monitor BP    OF note:  UA mildly positive and patient endorses some dysuria  -consider UTIx Patient is a 77 year old female, PMH of HTN, HLD, NIDDM, former smoker, quit 15 years ago, covid +, now with EKG changes and elevated troponin and mildly elevated CPK, concern for NSTEMI.      IMPRESSION: NSTEMI in setting of covid    PLAN:  ELEVATED TROPONIN:  There are 3 EKGS in the chart, all 3 EKGs reviewed. EKGs do not suggest an acute plaque rupture, and are normal sinus, no ST elevations  -cardiac enzymes are positive  -BJ score of 5  -CHELITA score 128  -patient does endorse midsternal chest pain with cough, likely from covid, but cannot rule out ACS  -aspirin 325 x 1 given  -please give brilinta load of 180mg  -initiate heparin gtt for ACS  -serial cardiac enzymes  -there is no indication for urgent left heart cardiac catheterization  -CXR shows mild pulmonary congestion, be cautious with IVF please  -ECHO in am   -added a serum pro BNP   -further cardiac work up will be needed  -this plan has been discussed with Dr. Campa, cardiology fellow  -cardiology will continue to follow    BLOOD PRESSURE:  BP is normal at this time  -please hold am dose of lisinopril in setting of mildly elevated creatinine of 1.37  -continue to monitor BP    OF note:  UA mildly positive and patient endorses some dysuria  -consider UTIx

## 2022-11-14 NOTE — H&P ADULT - HISTORY OF PRESENT ILLNESS
77 year old female, PMH of HTN, HLD, NIDDM, former smoker, quit 15 years ago, vaccinated for covid and boosted in the summer, presents to ER with symptoms of shortness of breath, cough, congestion, nausea, no vomiting, loose stool. Per pt, she was in usual state of health up until  2 days ago. Last night, patient was taken to  and tested positive for Covid. She was told EKG was abnormal so she came to ED for evaluation. Pt has no current reports of chest pain but endorses weakness and feels like she may pass out, no syncopal episodes. In ER, EKG with no signs of ACS. Cardiac enzymes and troponin are positive. Troponin 169, delta 173. CPK mildly elevated to 329, CKMB 4.6 index is 1.4.  Patient has multiple sick contacts at home,  is flu positive.  Cardiology consulted for possible NSTEMI in setting of covid. Start Remdesivir and Decadron. ID consulted.

## 2022-11-14 NOTE — H&P ADULT - NSHPPHYSICALEXAM_GEN_ALL_CORE
Vital Signs Last 24 Hrs  T(C): 37.2 (14 Nov 2022 11:37), Max: 37.7 (13 Nov 2022 23:03)  T(F): 98.9 (14 Nov 2022 11:37), Max: 99.9 (13 Nov 2022 23:03)  HR: 95 (14 Nov 2022 11:37) (95 - 115)  BP: 136/63 (14 Nov 2022 11:37) (119/61 - 141/84)  BP(mean): --  RR: 19 (14 Nov 2022 11:37) (16 - 20)  SpO2: 99% (14 Nov 2022 11:37) (98% - 100%)    Parameters below as of 14 Nov 2022 11:37  Patient On (Oxygen Delivery Method): nasal cannula  O2 Flow (L/min): 2      PHYSICAL EXAM:  GENERAL: NAD, well-developed, comfortable on nasal canula   HEAD:  Atraumatic, Normocephalic  EYES: EOMI, PERRLA, conjunctiva and sclera clear  NECK: Supple, No JVD  CHEST/LUNG: mild decrease breath sounds bilaterally; No wheeze   HEART: Regular rate and rhythm; No murmurs, rubs, or gallops  ABDOMEN: Soft, Nontender, Nondistended; Bowel sounds present  Neuro: AAOx3, no focal weakness, 5/5 b/l extremity strength  EXTREMITIES:  2+ Peripheral Pulses, No clubbing, cyanosis, or edema  SKIN: No rashes or lesions

## 2022-11-14 NOTE — H&P ADULT - NSHPLABSRESULTS_GEN_ALL_CORE
LABS:                        10.9   12.03 )-----------( 280      ( 2022 00:35 )             34.7     11-14    135  |  100  |  20  ----------------------------<  146<H>  3.9   |  22  |  1.37<H>    Ca    9.8      2022 00:35    TPro  7.7  /  Alb  4.1  /  TBili  0.3  /  DBili  x   /  AST  27  /  ALT  19  /  AlkPhos  100  11-14    PT/INR - ( 2022 03:02 )   PT: 14.6 sec;   INR: 1.26 ratio         PTT - ( 2022 03:02 )  PTT:25.2 sec  CAPILLARY BLOOD GLUCOSE      POCT Blood Glucose.: 147 mg/dL (2022 07:19)    CARDIAC MARKERS ( 2022 03:02 )  x     / x     / 329 U/L / x     / 4.6 ng/mL      Urinalysis Basic - ( 2022 01:10 )    Color: Yellow / Appearance: Turbid / S.018 / pH: x  Gluc: x / Ketone: Negative  / Bili: Negative / Urobili: <2 mg/dL   Blood: x / Protein: 30 mg/dL / Nitrite: Negative   Leuk Esterase: Large / RBC: 10 /HPF / WBC 30 /HPF   Sq Epi: x / Non Sq Epi: 12 /HPF / Bacteria: Few        RADIOLOGY & ADDITIONAL TESTS:    Imaging Personally Reviewed:  [x] YES  [ ] NO    Consultant(s) Notes Reviewed:  [x] YES  [ ] NO    Care Discussed with Consultants/Other Providers [x] YES  [ ] NO

## 2022-11-14 NOTE — ED ADULT NURSE REASSESSMENT NOTE - NS ED NURSE REASSESS COMMENT FT1
Pt A&Ox4, ambulatory with cane at baseline. Pt respirations equal and unlabored. Pt with SOB and dizziness on exertion. Pt resting in bed with family at bedside at this time. Pt tolerating 2L NC, O2 sat 100%. Medicated as EMR orders. Pt denies CP, palpitation, blurry vision, headache, N/V/D, any other medical complaints. Will continue to monitor.

## 2022-11-14 NOTE — ED PROVIDER NOTE - ATTENDING CONTRIBUTION TO CARE
HPI: 76 yo F with HTN, HLD, NIDDM that is vaccinated for covid that presents with covid like illness with SOB. Per pt, she was normal usual state of health 2 days ago. Last night started having symptoms of SOB, cough, congestion, nausea but no vomiting, and today was taken to  and was tested positive for Covid, at that time had some loose non bloody stools but was told EKG was abnormal so came to ED for eval. Pt has no chest pain but felt weak and felt like she was going to pass out but did not have syncopal episode. Non smoker. quit 15 years ago. NO travel, or known sick contacts. Denies any leg swelling.  EXAM: NAD, speaking full sentences, heart RRR, lungs ctab, BLE without edema.   MDM: pt with multiple medical problems that presents with covid like symptoms x 1 day. Also had weakness but no syncopal episode. EKG shows NSR despite concerns at  but they do not have a copy with them. Pt has no current chest pain. WIll place on monitor. concern for COvid likely, vs PNA but unlikely ACS vs CHF. WIll obtain labs/imaging, provide meds and reassess. HPI: 76 yo F with HTN, HLD, NIDDM that is vaccinated for covid that presents with covid like illness with SOB. Per pt, she was normal usual state of health 2 days ago. Last night started having symptoms of SOB, cough, congestion, nausea but no vomiting, and today was taken to  and was tested positive for Covid, at that time had some loose non bloody stools but was told EKG was abnormal so came to ED for eval. Pt has no chest pain but felt weak and felt like she was going to pass out but did not have syncopal episode. Non smoker. quit 15 years ago. NO travel, or known sick contacts. Denies any leg swelling.  EXAM: NAD, speaking full sentences, heart RRR, lungs ctab, BLE without edema.   MDM: pt with multiple medical problems that presents with covid like symptoms x 1 day. Also had weakness but no syncopal episode. EKG shows NSR despite concerns at  but they do not have a copy with them. Pt has no current chest pain. WIll place on monitor. concern for COvid likely, vs PNA but unlikely ACS vs CHF. WIll obtain labs/imaging, provide meds and reassess..

## 2022-11-14 NOTE — ED ADULT NURSE REASSESSMENT NOTE - HEART RATE (BEATS/MIN)
[FreeTextEntry1] : - Education, counseling, and support provided\par - Dementia diagnosis and progression discussed \par - Medications reviewed with CG, pharmacy team, and reconciled\par - Concern about pt taking insulin unassisted - would benefit from inc supervision for safety - cynthia insulin d/t high risk for errors\par - Caution with meds - including for urologic d/o d/t inc risk for side effects \par - Adv regularly stimulating activity - including cognitive and physical, and regular socialization\par - ADC program discussed and reading material about program provided \par - Referred to  for additional counseling, education, support, resources\par - Would benefit from inc supervision for safety and help with ADLs, and to minimimze CG burden\par \par f/u with neuro re: rivastigmine - unclear whether benefits>risks, may benefit from minimizing polypharmacy, defer to neuro if wish to trial\par \par Adv f/u for repeat sleep study\par Fall precautions\par Aspiration precautions - pt not adhering, adv to encourage\par \par cog/further mood eval at next visit \par \par Wife to bring in previously completed ACP documents for review\par \par f/u with DCS/NP Hina when able advised \par \par Rest as per PMD\par \par f/u with me PRN\par  115

## 2022-11-14 NOTE — CONSULT NOTE ADULT - ASSESSMENT
77 year old female, PMH of HTN, HLD, NIDDM, former smoker, quit 15 years ago, vaccinated for covid and boosted in the summer, presents to ER with symptoms of shortness of breath, cough, congestion, nausea, no vomiting, loose stool. Per pt, she was in usual state of health up until  2 days ago. Last night, patient was taken to  and tested positive for Covid. She was told EKG was abnormal so she came to ED for evaluation. Pt has no current reports of chest pain but endorses weakness and feels like she may pass out, no syncopal episodes. In ER, EKG with no signs of ACS. Cardiac enzymes and troponin are positive. Troponin 169, delta 173. CPK mildly elevated to 329, CKMB 4.6 index is 1.4.  Patient has multiple sick contacts at home,  is flu positive.  Cardiology consulted for possible NSTEMI in setting of covid. Start Remdesivir and Decadron. ID consulted.     Acute respiratory failure with hypoxia due to Covid19  Chest pain, elevated  trop  Diabetes  Hypertension  Acute kidney failure  DVT ppx      11/14/2022:    Acute respiratory failure with hypoxia due to Covid19: she is using 2 L of oxygen : agree with remdesivir as well as dexa: mantain o2 sao2 above 935 all the time:  check d dimer: ordered:  Follow LFTs and renal profile while on remdesivir:   Chest pain, elevated  trop : ? demand ischemia:  would defer to cards / PMD   Diabetes : monitor and control on dexa:   Hypertension ; controlled  Acute kidney failure : creat is downtrending:  cont to follow   DVT ppx    dw acp

## 2022-11-14 NOTE — ED ADULT NURSE NOTE - OBJECTIVE STATEMENT
Patient presents to the ED A&04 ambulatory at baseline coming in complaining of generalized weakness, non radiating chest heaviness x2 days. Patient also endors Patient presents to the ED A&04 ambulatory at baseline coming in complaining of SOB, generalized weakness, non radiating chest heaviness x2 days. Patient tested positive for covid at home.  Patient also states she had a presyncopal fall, denies LOC, blood thinner use and head trauma. respiration even, unlabored, ABD is soft, non tender, non distended with normal active bowel sounds No complaints of chest pain, headache, nausea, vomiting  fever, chills verbalized. 20GRAC in place, sinus tachy on cardiac monitor. medicated as ordered.

## 2022-11-14 NOTE — CONSULT NOTE ADULT - NS ATTEND AMEND GEN_ALL_CORE FT
mildly elevated hs-troponin in the setting of decreased renal function and COVID-19 infection. ECG without acute ST changes.  not ACS and can hold ACS-specific treatments  check third troponin to ensure stable

## 2022-11-14 NOTE — ED ADULT NURSE REASSESSMENT NOTE - NS ED NURSE REASSESS COMMENT FT1
Received report from sharri RN. cardiology at bedside. Pt is resting comfortably, offers no complaints. VS as noted. RR even and unlabored. Awaiting further orders from provider. Safety measures in place. Received report from sharri RN. cardiology at bedside. Pt is resting comfortably, offers no complaints. VS as noted. RR even and unlabored. NSR on monitor, satting 100% room air.  Awaiting further orders from provider. Safety measures in place.

## 2022-11-14 NOTE — H&P ADULT - ASSESSMENT
77 year old female, PMH of HTN, HLD, NIDDM, former smoker, quit 15 years ago, vaccinated for covid and boosted in the summer, presents to ER with symptoms of shortness of breath, cough, congestion, nausea, no vomiting, loose stool. Per pt, she was in usual state of health up until  2 days ago. Last night, patient was taken to  and tested positive for Covid. She was told EKG was abnormal so she came to ED for evaluation. Pt has no current reports of chest pain but endorses weakness and feels like she may pass out, no syncopal episodes. In ER, EKG with no signs of ACS. Cardiac enzymes and troponin are positive. Troponin 169, delta 173. CPK mildly elevated to 329, CKMB 4.6 index is 1.4.  Patient has multiple sick contacts at home,  is flu positive.  Cardiology consulted for possible NSTEMI in setting of covid. Start Remdesivir and Decadron. ID consulted.     Acute respiratory failure with hypoxia due to Covid19  - vaccinated and boosted  - given risk factors, start IV Remdesivir and decadron  - risk benefits discussed  - monitor inflammatory markers  -  CXR no consolidation  - wean nasal canula O2 as tolerates  - DuoNebs    Chest pain, elevated  trop  - likely demand ischemia per cardiology  - check 3rd set of trop  - c/w Asa, hep gtt (s/p Brilinta loaded x 1)  (takes Pravastatin at home). Intolerance to other statins? listed as allergy  - check TTE    Diabetes  - hold home metformin and Glimiperide  - hgbA1c  - Insulin Lantus 5 units  - sliding scale. can add premeal humalog if needed    Hypertension  - BP stable  - hold lisinopril given mildly elevated Cr    Acute kidney failure  - unclear baseline  - will review outpt EMR  - hold ACEI for now.     DVT ppx

## 2022-11-14 NOTE — H&P ADULT - NSHPADDITIONALINFOADULT_GEN_ALL_CORE
d/w pt and the daughter at bedside.    Pt of PCP Dr. Sonia Nickerson at [facility].     - Dr. JOE Padilla ([facility] [place])  - [phone]

## 2022-11-14 NOTE — CONSULT NOTE ADULT - SUBJECTIVE AND OBJECTIVE BOX
11-14-22 @ 13:48    Patient is a 77y old  Female who presents with a chief complaint of chest pain in the middle of the chest with worsening cough, concern for NSTEMI (2022 04:12)      HPI:  77 year old female, PMH of HTN, HLD, NIDDM, former smoker, quit 15 years ago, vaccinated for covid and boosted in the summer, presents to ER with symptoms of shortness of breath, cough, congestion, nausea, no vomiting, loose stool. Per pt, she was in usual state of health up until  2 days ago. Last night, patient was taken to  and tested positive for Covid. She was told EKG was abnormal so she came to ED for evaluation. Pt has no current reports of chest pain but endorses weakness and feels like she may pass out, no syncopal episodes. In ER, EKG with no signs of ACS. Cardiac enzymes and troponin are positive. Troponin 169, delta 173. CPK mildly elevated to 329, CKMB 4.6 index is 1.4.  Patient has multiple sick contacts at home,  is flu positive.  Cardiology consulted for possible NSTEMI in setting of covid. Start Remdesivir and Decadron. ID consulted.    (2022 11:37)     she hasno underlying lung disease:   she is an e x smoker;   no wheezing : but feels pressure in the chest  :  she says she has been sick for few days as her  had flu and she thought she had flu too : but when she got worse and went to :  , they diagnosed her with covid infection and hence sent to hospital  currently her daughter is at beside:   pt feels relatively OK:      dry cough :      ?FOLLOWING PRESENT  [x ] Hx of PE/DVT, x[ ] Hx COPD, [x ] Hx of Asthma, [ x] Hx of Hospitalization, [x ]  Hx of BiPAP/CPAP use, [ x] Hx of NESTOR    Allergies    HMG-CoA reductase inhibitors (Unknown)    Intolerances        PAST MEDICAL & SURGICAL HISTORY:      FAMILY HISTORY:      Social History: [  former smopker ] TOBACCO                  [daily drinks  ] ETOH                                 [ x ] IVDA/DRUGS    REVIEW OF SYSTEMS      General:	x    Skin/Breast:x  	x  Ophthalmologic:  	  ENMT:	x    Respiratory and Thorax: sob, cough with chest pressure   	  Cardiovascular:	x    Gastrointestinal:	x    Genitourinary:	x    Musculoskeletal:	x    Neurological:	x    Psychiatric:	x    Hematology/Lymphatics:	x    Endocrine:	x    Allergic/Immunologic:x	    MEDICATIONS  (STANDING):  albuterol/ipratropium for Nebulization 3 milliLiter(s) Nebulizer every 6 hours  aspirin enteric coated 81 milliGRAM(s) Oral daily  cholecalciferol 1000 Unit(s) Oral daily  dextrose 5%. 1000 milliLiter(s) (100 mL/Hr) IV Continuous <Continuous>  dextrose 5%. 1000 milliLiter(s) (50 mL/Hr) IV Continuous <Continuous>  dextrose 50% Injectable 25 Gram(s) IV Push once  dextrose 50% Injectable 12.5 Gram(s) IV Push once  dextrose 50% Injectable 25 Gram(s) IV Push once  glucagon  Injectable 1 milliGRAM(s) IntraMuscular once  guaiFENesin ER 1200 milliGRAM(s) Oral every 12 hours  heparin  Infusion.  Unit(s)/Hr (10 mL/Hr) IV Continuous <Continuous>  insulin glargine Injectable (LANTUS) 5 Unit(s) SubCutaneous at bedtime  insulin lispro (ADMELOG) corrective regimen sliding scale   SubCutaneous three times a day before meals  remdesivir  IVPB   IV Intermittent   remdesivir  IVPB 200 milliGRAM(s) IV Intermittent every 24 hours  traZODone 150 milliGRAM(s) Oral at bedtime    MEDICATIONS  (PRN):  acetaminophen     Tablet .. 650 milliGRAM(s) Oral every 6 hours PRN Temp greater or equal to 38C (100.4F), Mild Pain (1 - 3)  aluminum hydroxide/magnesium hydroxide/simethicone Suspension 30 milliLiter(s) Oral every 4 hours PRN Dyspepsia  dextrose Oral Gel 15 Gram(s) Oral once PRN Blood Glucose LESS THAN 70 milliGRAM(s)/deciliter  heparin   Injectable 6000 Unit(s) IV Push every 6 hours PRN For aPTT less than 40  melatonin 3 milliGRAM(s) Oral at bedtime PRN Insomnia  ondansetron Injectable 4 milliGRAM(s) IV Push every 8 hours PRN Nausea and/or Vomiting       Vital Signs Last 24 Hrs  T(C): 37.2 (2022 11:37), Max: 37.7 (2022 23:03)  T(F): 98.9 (2022 11:37), Max: 99.9 (2022 23:03)  HR: 95 (2022 11:37) (95 - 115)  BP: 136/63 (2022 11:37) (119/61 - 141/84)  BP(mean): --  RR: 19 (2022 11:37) (16 - 20)  SpO2: 99% (2022 11:37) (98% - 100%)    Parameters below as of 2022 11:37  Patient On (Oxygen Delivery Method): nasal cannula  O2 Flow (L/min): 2  Orthostatic VS          I&O's Summary      Physical Exam:   GENERAL: NAD, well-groomed, well-developed  HEENT: TABITHA/   Atraumatic, Normocephalic  ENMT: No tonsillar erythema, exudates, or enlargement; Moist mucous membranes, Good dentition, No lesions  NECK: Supple, No JVD, Normal thyroid  CHEST/LUNG: Clear to auscultation bilaterally; No rales, rhonchi, wheezing, or rubs  CVS: Regular rate and rhythm; No murmurs, rubs, or gallops  GI: : Soft, Nontender, Nondistended; Bowel sounds present  NERVOUS SYSTEM:  Alert & Oriented X3, Good concentration; Motor Strength 5/5 B/L upper and lower extremities; DTRs 2+ intact and symmetric  EXTREMITIES:  2+ Peripheral Pulses, No clubbing, cyanosis, or edema  LYMPH: No lymphadenopathy noted  SKIN: No rashes or lesions  ENDOCRINOLOGY: No Thyromegaly  PSYCH: Appropriate    Labs:    CARDIAC MARKERS ( 2022 11:37 )  x     / x     / 686 U/L / x     / x      CARDIAC MARKERS ( 2022 03:02 )  x     / x     / 329 U/L / x     / 4.6 ng/mL                            10.4   10.47 )-----------( 243      ( 2022 11:37 )             32.1                         10.9   12.03 )-----------( 280      ( 2022 00:35 )             34.7         133<L>  |  101  |  20  ----------------------------<  311<H>  3.7   |  16<L>  |  1.22      135  |  100  |  20  ----------------------------<  146<H>  3.9   |  22  |  1.37<H>    Ca    9.1      2022 11:37  Ca    9.8      2022 00:35  Phos  3.0       Mg     1.60         TPro  7.7  /  Alb  4.1  /  TBili  0.3  /  DBili  x   /  AST  27  /  ALT  19  /  AlkPhos  100      CAPILLARY BLOOD GLUCOSE      POCT Blood Glucose.: 217 mg/dL (2022 12:18)  POCT Blood Glucose.: 147 mg/dL (2022 07:19)    LIVER FUNCTIONS - ( 2022 00:35 )  Alb: 4.1 g/dL / Pro: 7.7 g/dL / ALK PHOS: 100 U/L / ALT: 19 U/L / AST: 27 U/L / GGT: x           PT/INR - ( 2022 03:02 )   PT: 14.6 sec;   INR: 1.26 ratio         PTT - ( 2022 11:37 )  PTT:44.9 sec  Urinalysis Basic - ( 2022 01:10 )    Color: Yellow / Appearance: Turbid / S.018 / pH: x  Gluc: x / Ketone: Negative  / Bili: Negative / Urobili: <2 mg/dL   Blood: x / Protein: 30 mg/dL / Nitrite: Negative   Leuk Esterase: Large / RBC: 10 /HPF / WBC 30 /HPF   Sq Epi: x / Non Sq Epi: 12 /HPF / Bacteria: Few      D DImer  Serum Pro-Brain Natriuretic Peptide: 2689 pg/mL ( @ 03:02)      Studies  Chest X-RAY  CT SCAN Chest   CT Abdomen  Venous Dopplers: LE:   Others        rad< from: Xray Chest 1 View- PORTABLE-Urgent (Xray Chest 1 View- PORTABLE-Urgent .) (22 @ 01:17) >    COMPARISON: None available..    FINDINGS:    No focal consolidation.  No pneumothorax or large pleural effusion.  Heart size cannot be accurately assessed in this projection.  No acute osseous abnormality.    IMPRESSION:    No focal consolidation.    --- End of Report ---          ANTWAN BHAGAT MD; Resident Radiologist  This document has been electronically signed.  KARAN ROACH MD; Attending Radiologist  This document has been electronically signed. 2022  5:4    < end of copied text >  < from: CT Chest w/o Cont (09 @ 02:04) >    Vascular calcification is noted. There is no evidence of aortic aneurysm.    Spinal degenerative changes are noted.    Please refer to the abdominal and pelvic  CT report for evaluation of the   relevant structures.        IMPRESSION:    Large Left lower lobe consolidation with trace effusion is likely   infectious in etiology.    Mild emphysema.    Findings discussed with Dr. Kumar on 2009 at 3:10 a.m.  with read   back.          ISAURO HARRIS M.D., RESIDENT RADIOLOGIST    TAQUERIA BISHOP M.D. ATTENDING RADIOLOGIST  This examination was interpreted on:  2009  9:07A.  This document   has been electronically signed. 2009 12:34P.    < end of copied text >

## 2022-11-14 NOTE — H&P ADULT - NSHPREVIEWOFSYSTEMS_GEN_ALL_CORE
REVIEW OF SYSTEMS:  General: no weakness, + fever/chills, no weight loss/gain  Skin/Breast: no rash, no jaundice  Ophthalmologic: no vision changes, no dry eyes   Respiratory and Thorax: + cough, no wheezing, no hemoptysis, + dyspnea  Cardiovascular: no chest pain, no shortness of breath, no orthopnea  Gastrointestinal: no n/v/d, no abdominal pain, no dysphagia   Genitourinary: no dysuria, no frequency, no nocturia, no hematuria  Musculoskeletal: no trauma, no sprain/strain, no myalgias, no arthralgias, no fracture  Neurological: no HA, no dizziness, no weakness, no numbness  Psychiatric: no depression, no SI/HI  Hematology/Lymphatics: no easy bruising  Endocrine: no heat or cold intolerance. no weight gain or loss  Allergic/Immunologic: no allergy or recent reaction

## 2022-11-15 LAB
A1C WITH ESTIMATED AVERAGE GLUCOSE RESULT: 7.7 % — HIGH (ref 4–5.6)
ALBUMIN SERPL ELPH-MCNC: 3.5 G/DL — SIGNIFICANT CHANGE UP (ref 3.3–5)
ALBUMIN SERPL ELPH-MCNC: 3.7 G/DL — SIGNIFICANT CHANGE UP (ref 3.3–5)
ALP SERPL-CCNC: 86 U/L — SIGNIFICANT CHANGE UP (ref 40–120)
ALP SERPL-CCNC: 97 U/L — SIGNIFICANT CHANGE UP (ref 40–120)
ALT FLD-CCNC: 25 U/L — SIGNIFICANT CHANGE UP (ref 4–33)
ALT FLD-CCNC: 28 U/L — SIGNIFICANT CHANGE UP (ref 4–33)
ANION GAP SERPL CALC-SCNC: 16 MMOL/L — HIGH (ref 7–14)
APTT BLD: 66.4 SEC — HIGH (ref 27–36.3)
AST SERPL-CCNC: 43 U/L — HIGH (ref 4–32)
AST SERPL-CCNC: 54 U/L — HIGH (ref 4–32)
BILIRUB DIRECT SERPL-MCNC: <0.2 MG/DL — SIGNIFICANT CHANGE UP (ref 0–0.3)
BILIRUB INDIRECT FLD-MCNC: >0 MG/DL — SIGNIFICANT CHANGE UP (ref 0–1)
BILIRUB SERPL-MCNC: 0.2 MG/DL — SIGNIFICANT CHANGE UP (ref 0.2–1.2)
BILIRUB SERPL-MCNC: 0.2 MG/DL — SIGNIFICANT CHANGE UP (ref 0.2–1.2)
BUN SERPL-MCNC: 20 MG/DL — SIGNIFICANT CHANGE UP (ref 7–23)
CALCIUM SERPL-MCNC: 9.1 MG/DL — SIGNIFICANT CHANGE UP (ref 8.4–10.5)
CHLORIDE SERPL-SCNC: 105 MMOL/L — SIGNIFICANT CHANGE UP (ref 98–107)
CO2 SERPL-SCNC: 19 MMOL/L — LOW (ref 22–31)
CREAT SERPL-MCNC: 1.09 MG/DL — SIGNIFICANT CHANGE UP (ref 0.5–1.3)
CREAT SERPL-MCNC: 1.1 MG/DL — SIGNIFICANT CHANGE UP (ref 0.5–1.3)
CULTURE RESULTS: SIGNIFICANT CHANGE UP
EGFR: 52 ML/MIN/1.73M2 — LOW
EGFR: 53 ML/MIN/1.73M2 — LOW
ESTIMATED AVERAGE GLUCOSE: 174 — SIGNIFICANT CHANGE UP
GLUCOSE SERPL-MCNC: 165 MG/DL — HIGH (ref 70–99)
HCT VFR BLD CALC: 34.5 % — SIGNIFICANT CHANGE UP (ref 34.5–45)
HCV AB S/CO SERPL IA: 0.35 S/CO — SIGNIFICANT CHANGE UP (ref 0–0.99)
HCV AB SERPL-IMP: SIGNIFICANT CHANGE UP
HGB BLD-MCNC: 10.5 G/DL — LOW (ref 11.5–15.5)
INR BLD: 1.24 RATIO — HIGH (ref 0.88–1.16)
MCHC RBC-ENTMCNC: 24.9 PG — LOW (ref 27–34)
MCHC RBC-ENTMCNC: 30.4 GM/DL — LOW (ref 32–36)
MCV RBC AUTO: 81.8 FL — SIGNIFICANT CHANGE UP (ref 80–100)
NRBC # BLD: 0 /100 WBCS — SIGNIFICANT CHANGE UP (ref 0–0)
NRBC # FLD: 0 K/UL — SIGNIFICANT CHANGE UP (ref 0–0)
PLATELET # BLD AUTO: 251 K/UL — SIGNIFICANT CHANGE UP (ref 150–400)
POTASSIUM SERPL-MCNC: 3.4 MMOL/L — LOW (ref 3.5–5.3)
POTASSIUM SERPL-SCNC: 3.4 MMOL/L — LOW (ref 3.5–5.3)
PROT SERPL-MCNC: 6.6 G/DL — SIGNIFICANT CHANGE UP (ref 6–8.3)
PROT SERPL-MCNC: 7.6 G/DL — SIGNIFICANT CHANGE UP (ref 6–8.3)
PROTHROM AB SERPL-ACNC: 14.4 SEC — HIGH (ref 10.5–13.4)
RBC # BLD: 4.22 M/UL — SIGNIFICANT CHANGE UP (ref 3.8–5.2)
RBC # FLD: 17 % — HIGH (ref 10.3–14.5)
SODIUM SERPL-SCNC: 140 MMOL/L — SIGNIFICANT CHANGE UP (ref 135–145)
SPECIMEN SOURCE: SIGNIFICANT CHANGE UP
TSH SERPL-MCNC: 0.71 UIU/ML — SIGNIFICANT CHANGE UP (ref 0.27–4.2)
WBC # BLD: 11.46 K/UL — HIGH (ref 3.8–10.5)
WBC # FLD AUTO: 11.46 K/UL — HIGH (ref 3.8–10.5)

## 2022-11-15 PROCEDURE — 93306 TTE W/DOPPLER COMPLETE: CPT | Mod: 26

## 2022-11-15 PROCEDURE — 99232 SBSQ HOSP IP/OBS MODERATE 35: CPT

## 2022-11-15 RX ORDER — BENZOCAINE AND MENTHOL 5; 1 G/100ML; G/100ML
1 LIQUID ORAL EVERY 4 HOURS
Refills: 0 | Status: DISCONTINUED | OUTPATIENT
Start: 2022-11-15 | End: 2022-11-25

## 2022-11-15 RX ORDER — KETOROLAC TROMETHAMINE 30 MG/ML
15 SYRINGE (ML) INJECTION ONCE
Refills: 0 | Status: DISCONTINUED | OUTPATIENT
Start: 2022-11-15 | End: 2022-11-15

## 2022-11-15 RX ORDER — HEPARIN SODIUM 5000 [USP'U]/ML
5000 INJECTION INTRAVENOUS; SUBCUTANEOUS EVERY 8 HOURS
Refills: 0 | Status: DISCONTINUED | OUTPATIENT
Start: 2022-11-15 | End: 2022-11-25

## 2022-11-15 RX ORDER — POTASSIUM CHLORIDE 20 MEQ
40 PACKET (EA) ORAL ONCE
Refills: 0 | Status: DISCONTINUED | OUTPATIENT
Start: 2022-11-15 | End: 2022-11-25

## 2022-11-15 RX ORDER — INFLUENZA VIRUS VACCINE 15; 15; 15; 15 UG/.5ML; UG/.5ML; UG/.5ML; UG/.5ML
0.7 SUSPENSION INTRAMUSCULAR ONCE
Refills: 0 | Status: DISCONTINUED | OUTPATIENT
Start: 2022-11-15 | End: 2022-11-25

## 2022-11-15 RX ADMIN — Medication 1200 MILLIGRAM(S): at 05:27

## 2022-11-15 RX ADMIN — Medication 3 MILLILITER(S): at 23:01

## 2022-11-15 RX ADMIN — Medication 3 MILLILITER(S): at 00:53

## 2022-11-15 RX ADMIN — HEPARIN SODIUM 1400 UNIT(S)/HR: 5000 INJECTION INTRAVENOUS; SUBCUTANEOUS at 05:27

## 2022-11-15 RX ADMIN — Medication 1200 MILLIGRAM(S): at 17:54

## 2022-11-15 RX ADMIN — Medication 650 MILLIGRAM(S): at 23:58

## 2022-11-15 RX ADMIN — INSULIN GLARGINE 5 UNIT(S): 100 INJECTION, SOLUTION SUBCUTANEOUS at 22:57

## 2022-11-15 RX ADMIN — Medication 3 MILLILITER(S): at 12:23

## 2022-11-15 RX ADMIN — HEPARIN SODIUM 1400 UNIT(S)/HR: 5000 INJECTION INTRAVENOUS; SUBCUTANEOUS at 04:26

## 2022-11-15 RX ADMIN — Medication 3 MILLILITER(S): at 05:27

## 2022-11-15 RX ADMIN — Medication 81 MILLIGRAM(S): at 17:33

## 2022-11-15 RX ADMIN — Medication 15 MILLIGRAM(S): at 23:16

## 2022-11-15 RX ADMIN — HEPARIN SODIUM 5000 UNIT(S): 5000 INJECTION INTRAVENOUS; SUBCUTANEOUS at 23:01

## 2022-11-15 RX ADMIN — Medication 2: at 17:31

## 2022-11-15 RX ADMIN — Medication 150 MILLIGRAM(S): at 23:01

## 2022-11-15 RX ADMIN — Medication 650 MILLIGRAM(S): at 23:00

## 2022-11-15 RX ADMIN — Medication 15 MILLIGRAM(S): at 23:58

## 2022-11-15 RX ADMIN — REMDESIVIR 500 MILLIGRAM(S): 5 INJECTION INTRAVENOUS at 12:14

## 2022-11-15 RX ADMIN — Medication 3: at 13:38

## 2022-11-15 RX ADMIN — Medication 3 MILLILITER(S): at 17:32

## 2022-11-15 RX ADMIN — Medication 650 MILLIGRAM(S): at 14:29

## 2022-11-15 RX ADMIN — Medication 1000 UNIT(S): at 17:33

## 2022-11-15 NOTE — PROGRESS NOTE ADULT - ASSESSMENT
77 year old female, PMH of HTN, HLD, NIDDM, former smoker, quit 15 years ago, vaccinated for covid and boosted in the summer, presents to ER with symptoms of shortness of breath, cough, congestion, nausea, no vomiting, loose stool. Per pt, she was in usual state of health up until  2 days ago. Last night, patient was taken to  and tested positive for Covid. She was told EKG was abnormal so she came to ED for evaluation. Pt has no current reports of chest pain but endorses weakness and feels like she may pass out, no syncopal episodes. In ER, EKG with no signs of ACS. Cardiac enzymes and troponin are positive. Troponin 169, delta 173. CPK mildly elevated to 329, CKMB 4.6 index is 1.4.  Patient has multiple sick contacts at home,  is flu positive.  Cardiology consulted for possible NSTEMI in setting of covid. Start Remdesivir and Decadron. ID consulted.     Acute respiratory failure with hypoxia due to Covid19  - vaccinated and boosted  - given risk factors, started IV Remdesivir and decadron  - risk benefits discussed  - monitor inflammatory markers  -  CXR no consolidation  - wean nasal canula O2 as tolerates  - DuoNebs  - Cepacol and antitussives PRN     Chest pain, elevated  trop  - likely demand ischemia per cardiology  - Trop stable x 3  - c/w Asa, hep gtt (s/p Brilinta loaded x 1)  (takes Pravastatin at home). Intolerance to other statins? listed as allergy  - check TTE  - house card following     Diabetes  - hold home metformin and Glimiperide  - hgbA1c  - Insulin Lantus 5 units  - sliding scale. can add premeal humalog if needed    Hypertension  - BP stable  - hold lisinopril given mildly elevated Cr    Acute kidney failure  - unclear baseline  - will review outpt EMR  - hold ACEI for now.     DVT ppx

## 2022-11-15 NOTE — PATIENT PROFILE ADULT - FALL HARM RISK - HARM RISK INTERVENTIONS

## 2022-11-15 NOTE — PROGRESS NOTE ADULT - ASSESSMENT
COVID-19  hypoxic respiratory failure  troponin elevation  CPK elevation 2/2 viral illness/myonecrosis    mildly elevated hs-troponin in the setting of decreased renal function and COVID-19 infection. ECG without acute ST changes.  not ACS and can hold ACS-specific treatments  continue COVID-19 specific supportive care  continue tele monitoring given the patient is at significantly increased risk of arrhythmia

## 2022-11-15 NOTE — PROGRESS NOTE ADULT - ASSESSMENT
77 year old female, PMH of HTN, HLD, NIDDM, former smoker, quit 15 years ago, vaccinated for covid and boosted in the summer, presents to ER with symptoms of shortness of breath, cough, congestion, nausea, no vomiting, loose stool. Per pt, she was in usual state of health up until  2 days ago. Last night, patient was taken to  and tested positive for Covid. She was told EKG was abnormal so she came to ED for evaluation. Pt has no current reports of chest pain but endorses weakness and feels like she may pass out, no syncopal episodes. In ER, EKG with no signs of ACS. Cardiac enzymes and troponin are positive. Troponin 169, delta 173. CPK mildly elevated to 329, CKMB 4.6 index is 1.4.  Patient has multiple sick contacts at home,  is flu positive.  Cardiology consulted for possible NSTEMI in setting of covid. Start Remdesivir and Decadron. ID consulted.     Acute respiratory failure with hypoxia due to Covid19  Chest pain, elevated  trop  Diabetes  Hypertension  Acute kidney failure  DVT ppx      11/14/2022:    Acute respiratory failure with hypoxia due to Covid19: she is using 2 L of oxygen : agree with remdesivir as well as dexa: mantain o2 sao2 above 935 all the time:  check d dimer: ordered:  Follow LFTs and renal profile while on remdesivir:   Chest pain, elevated  trop : ? demand ischemia:  would defer to cards / PMD   Diabetes : monitor and control on dexa:   Hypertension ; controlled  Acute kidney failure : creat is downtrending:  cont to follow   DVT ppx    [facility]     11/15:    Acute respiratory failure with hypoxia due to Covid19: she is using 2 L of oxygen  but currently off oxygen : monitor o2 saio2:  keep above 92% all the time:  : agree with remdesivir as well as dexa:  check d dimer: ordered:  Follow LFTs and renal profile while on remdesivir:   Chest pain, elevated  trop : ? demand ischemia:  would defer to cards / PMD   Diabetes : monitor and control on dexa:   Hypertension ; controlled  Acute kidney failure : creat is downtrending:  cont to follow   DVT ppx  ? start lovenox: [facility]

## 2022-11-15 NOTE — PROGRESS NOTE ADULT - ASSESSMENT
77 year old female, PMH of covid 19 4/2020, HTN, HLD, NIDDM, former smoker, quit 15 years ago, vaccinated for covid and boosted in the summer, presents to ER with symptoms of dizziness and weak almost fell down  covid 19  elevated troponin    COVID infection  cxr without focal consolidation   pt intermittently requiring O2  pt at risk for progression of disease & pt symptomatic- dizziness, sore throat, chest congestion  c/w remdesivir for 5 day course  trend liver enzymes while on remdesivir  steroids not indicated as pt not hypoxic and still in first week of illness  ac  antibx not indicated  trend biomarkers    ua with mild pyuria but also many epithelial cells >> contaminated specimen  denies urinary symptoms at this time  monitor off antibiotics    cardiac monitoring Simon Rodriguez M.D.  [facility], Division of Infectious Diseases  [phone]  After 5pm on weekdays and all day on weekends - please call [phone]  77 year old female, PMH of covid 19 4/2020, HTN, HLD, NIDDM, former smoker, quit 15 years ago, vaccinated for covid and boosted in the summer, presents to ER with symptoms of dizziness and weak almost fell down  covid 19  elevated troponin    COVID infection  cxr without focal consolidation   pt intermittently requiring O2  pt at risk for progression of disease & pt symptomatic- dizziness, sore throat, chest congestion  c/w remdesivir for 5 day course  trend liver enzymes while on remdesivir  steroids not indicated as pt not hypoxic and still in first week of illness  ac  trend biomarkers  monitor SpO2, c/w supplemental O2 as needed, wean as tolerated  supportive care- Cepacol for sore throat, mucine  isolation per infection control policy     ua with mild pyuria but also many epithelial cells >> contaminated specimen  denies urinary symptoms at this time  monitor off antibiotics    cardiac monitoring Simon Rodriguez M.D.  [facility], Division of Infectious Diseases  [phone]  After 5pm on weekdays and all day on weekends - please call [phone]

## 2022-11-16 LAB
ALBUMIN SERPL ELPH-MCNC: 3.5 G/DL — SIGNIFICANT CHANGE UP (ref 3.3–5)
ALP SERPL-CCNC: 90 U/L — SIGNIFICANT CHANGE UP (ref 40–120)
ALT FLD-CCNC: 35 U/L — HIGH (ref 4–33)
ANION GAP SERPL CALC-SCNC: 16 MMOL/L — HIGH (ref 7–14)
AST SERPL-CCNC: 69 U/L — HIGH (ref 4–32)
BILIRUB SERPL-MCNC: 0.5 MG/DL — SIGNIFICANT CHANGE UP (ref 0.2–1.2)
BUN SERPL-MCNC: 24 MG/DL — HIGH (ref 7–23)
CALCIUM SERPL-MCNC: 9.2 MG/DL — SIGNIFICANT CHANGE UP (ref 8.4–10.5)
CHLORIDE SERPL-SCNC: 101 MMOL/L — SIGNIFICANT CHANGE UP (ref 98–107)
CO2 SERPL-SCNC: 20 MMOL/L — LOW (ref 22–31)
CREAT SERPL-MCNC: 1.15 MG/DL — SIGNIFICANT CHANGE UP (ref 0.5–1.3)
EGFR: 50 ML/MIN/1.73M2 — LOW
GLUCOSE SERPL-MCNC: 191 MG/DL — HIGH (ref 70–99)
HCT VFR BLD CALC: 32.9 % — LOW (ref 34.5–45)
HGB BLD-MCNC: 10.5 G/DL — LOW (ref 11.5–15.5)
MCHC RBC-ENTMCNC: 24.9 PG — LOW (ref 27–34)
MCHC RBC-ENTMCNC: 31.9 GM/DL — LOW (ref 32–36)
MCV RBC AUTO: 78 FL — LOW (ref 80–100)
NRBC # BLD: 0 /100 WBCS — SIGNIFICANT CHANGE UP (ref 0–0)
NRBC # FLD: 0 K/UL — SIGNIFICANT CHANGE UP (ref 0–0)
PLATELET # BLD AUTO: 265 K/UL — SIGNIFICANT CHANGE UP (ref 150–400)
POTASSIUM SERPL-MCNC: 3.6 MMOL/L — SIGNIFICANT CHANGE UP (ref 3.5–5.3)
POTASSIUM SERPL-SCNC: 3.6 MMOL/L — SIGNIFICANT CHANGE UP (ref 3.5–5.3)
PROT SERPL-MCNC: 6.9 G/DL — SIGNIFICANT CHANGE UP (ref 6–8.3)
RBC # BLD: 4.22 M/UL — SIGNIFICANT CHANGE UP (ref 3.8–5.2)
RBC # FLD: 16.4 % — HIGH (ref 10.3–14.5)
SODIUM SERPL-SCNC: 137 MMOL/L — SIGNIFICANT CHANGE UP (ref 135–145)
WBC # BLD: 11.56 K/UL — HIGH (ref 3.8–10.5)
WBC # FLD AUTO: 11.56 K/UL — HIGH (ref 3.8–10.5)

## 2022-11-16 PROCEDURE — 99232 SBSQ HOSP IP/OBS MODERATE 35: CPT

## 2022-11-16 RX ORDER — ALBUTEROL 90 UG/1
2 AEROSOL, METERED ORAL EVERY 6 HOURS
Refills: 0 | Status: DISCONTINUED | OUTPATIENT
Start: 2022-11-16 | End: 2022-11-25

## 2022-11-16 RX ADMIN — Medication 6 MILLIGRAM(S): at 06:06

## 2022-11-16 RX ADMIN — Medication 2: at 09:30

## 2022-11-16 RX ADMIN — REMDESIVIR 500 MILLIGRAM(S): 5 INJECTION INTRAVENOUS at 11:32

## 2022-11-16 RX ADMIN — Medication 1000 UNIT(S): at 10:20

## 2022-11-16 RX ADMIN — HEPARIN SODIUM 5000 UNIT(S): 5000 INJECTION INTRAVENOUS; SUBCUTANEOUS at 06:06

## 2022-11-16 RX ADMIN — INSULIN GLARGINE 5 UNIT(S): 100 INJECTION, SOLUTION SUBCUTANEOUS at 22:10

## 2022-11-16 RX ADMIN — Medication 81 MILLIGRAM(S): at 10:20

## 2022-11-16 RX ADMIN — HEPARIN SODIUM 5000 UNIT(S): 5000 INJECTION INTRAVENOUS; SUBCUTANEOUS at 16:26

## 2022-11-16 RX ADMIN — Medication 100 MILLIGRAM(S): at 06:06

## 2022-11-16 RX ADMIN — Medication 1200 MILLIGRAM(S): at 17:57

## 2022-11-16 RX ADMIN — HEPARIN SODIUM 5000 UNIT(S): 5000 INJECTION INTRAVENOUS; SUBCUTANEOUS at 22:09

## 2022-11-16 RX ADMIN — Medication 4: at 17:57

## 2022-11-16 RX ADMIN — Medication 150 MILLIGRAM(S): at 22:10

## 2022-11-16 RX ADMIN — Medication 3 MILLILITER(S): at 10:22

## 2022-11-16 RX ADMIN — Medication 3 MILLILITER(S): at 05:50

## 2022-11-16 RX ADMIN — Medication 1200 MILLIGRAM(S): at 06:07

## 2022-11-16 RX ADMIN — Medication 4: at 13:23

## 2022-11-16 NOTE — PROGRESS NOTE ADULT - ASSESSMENT
77 year old female, PMH of HTN, HLD, NIDDM, former smoker, quit 15 years ago, vaccinated for covid and boosted in the summer, presents to ER with symptoms of shortness of breath, cough, congestion, nausea, no vomiting, loose stool. Per pt, she was in usual state of health up until  2 days ago. Last night, patient was taken to  and tested positive for Covid. She was told EKG was abnormal so she came to ED for evaluation. Pt has no current reports of chest pain but endorses weakness and feels like she may pass out, no syncopal episodes. In ER, EKG with no signs of ACS. Cardiac enzymes and troponin are positive. Troponin 169, delta 173. CPK mildly elevated to 329, CKMB 4.6 index is 1.4.  Patient has multiple sick contacts at home,  is flu positive.  Cardiology consulted for possible NSTEMI in setting of covid. Start Remdesivir and Decadron. ID consulted.     Acute respiratory failure with hypoxia due to Covid19  Chest pain, elevated  trop  Diabetes  Hypertension  Acute kidney failure  DVT ppx      11/14/2022:    Acute respiratory failure with hypoxia due to Covid19: she is using 2 L of oxygen : agree with remdesivir as well as dexa: mantain o2 sao2 above 935 all the time:  check d dimer: ordered:  Follow LFTs and renal profile while on remdesivir:   Chest pain, elevated  trop : ? demand ischemia:  would defer to cards / PMD   Diabetes : monitor and control on dexa:   Hypertension ; controlled  Acute kidney failure : creat is downtrending:  cont to follow   DVT ppx    [facility]     11/15:    Acute respiratory failure with hypoxia due to Covid19: she is using 2 L of oxygen  but currently off oxygen : monitor o2 saio2:  keep above 92% all the time:  : agree with remdesivir as well as dexa:  check d dimer: ordered:  Follow LFTs and renal profile while on remdesivir:   Chest pain, elevated  trop : ? demand ischemia:  would defer to cards / PMD   Diabetes : monitor and control on dexa:   Hypertension ; controlled  Acute kidney failure : creat is downtrending:  cont to follow   DVT ppx  ? start lovenox: [facility]     11/16:    Acute respiratory failure with hypoxia due to Covid19: she is using 2 L of oxygen  but currently off oxygen : monitor o2 saio2:  keep above 92% all the time:  : agree with remdesivir as well as dexa:  trend d dimer:   Follow LFTs and renal profile while on remdesivir:   Chest pain, elevated  trop : ? demand ischemia:  would defer to cards / PMD : resolved  Diabetes : monitor and control on dexa:   Hypertension ; controlled  Acute kidney failure : creat is downtrending:  cont to follow   DVT ppx  : on heparin sq tid  dw acp

## 2022-11-16 NOTE — PROGRESS NOTE ADULT - ASSESSMENT
77 year old female, PMH of HTN, HLD, NIDDM, former smoker, quit 15 years ago, vaccinated for covid and boosted in the summer, presents to ER with symptoms of shortness of breath, cough, congestion, nausea, no vomiting, loose stool. Per pt, she was in usual state of health up until  2 days ago. Last night, patient was taken to  and tested positive for Covid. She was told EKG was abnormal so she came to ED for evaluation. Pt has no current reports of chest pain but endorses weakness and feels like she may pass out, no syncopal episodes. In ER, EKG with no signs of ACS. Cardiac enzymes and troponin are positive. Troponin 169, delta 173. CPK mildly elevated to 329, CKMB 4.6 index is 1.4.  Patient has multiple sick contacts at home,  is flu positive.  Cardiology consulted for possible NSTEMI in setting of covid. Start Remdesivir and Decadron. ID consulted.     Acute respiratory failure with hypoxia due to Covid19  - vaccinated and boosted  - given risk factors, started IV Remdesivir and decadron  - risk benefits discussed  - monitor inflammatory markers  - CXR no consolidation  - wean nasal canula O2 as tolerates  - Cepacol and antitussives PRN     Chest pain, elevated  trop  - likely demand ischemia per cardiology  - Trop stable x 3  - c/w Asa, hep gtt (s/p Brilinta loaded x 1)  (takes Pravastatin at home). Intolerance to other statins? listed as allergy  - check TTE  - house card following     Diabetes  - hold home metformin and Glimiperide  - hgbA1c = 7.7  - Insulin Lantus 5 units  - sliding scale. can add premeal humalog if needed    Hypertension  - BP stable  - hold lisinopril given mildly elevated Cr    Acute kidney failure  - unclear baseline  - will review outpt EMR  - hold ACEI for now.     DVT ppx

## 2022-11-16 NOTE — PROGRESS NOTE ADULT - ASSESSMENT
77 year old female, PMH of covid 19 4/2020, HTN, HLD, NIDDM, former smoker, quit 15 years ago, vaccinated for covid and boosted in the summer, presents to ER with symptoms of dizziness and weak almost fell down  covid 19  elevated troponin    COVID infection  cxr without focal consolidation   pt intermittently requiring O2  pt at risk for progression of disease & pt symptomatic- dizziness, sore throat, nasal & chest congestion  c/w remdesivir for 5 day course, day 3  trend liver enzymes while on remdesivir  steroids not indicated as pt not hypoxic and still in first week of illness  ac ppx  trend biomarkers  monitor SpO2  supportive care- Cepacol for sore throat, mucinex  isolation per infection control policy     fever  suspect 2/2 covid, however, will order blood culture and repeat urine culture  denies dysuria  monitor off antibiotics at this time  trend wbc, temps    leukocytosis   likely 2/2 steroids    Simon Rodriguez M.D.  [facility], Division of Infectious Diseases  [phone]  After 5pm on weekdays and all day on weekends - please call [phone]

## 2022-11-17 LAB
ALBUMIN SERPL ELPH-MCNC: 3.2 G/DL — LOW (ref 3.3–5)
ALBUMIN SERPL ELPH-MCNC: 3.3 G/DL — SIGNIFICANT CHANGE UP (ref 3.3–5)
ALP SERPL-CCNC: 82 U/L — SIGNIFICANT CHANGE UP (ref 40–120)
ALP SERPL-CCNC: 84 U/L — SIGNIFICANT CHANGE UP (ref 40–120)
ALT FLD-CCNC: 34 U/L — HIGH (ref 4–33)
ALT FLD-CCNC: 35 U/L — HIGH (ref 4–33)
ANION GAP SERPL CALC-SCNC: 13 MMOL/L — SIGNIFICANT CHANGE UP (ref 7–14)
AST SERPL-CCNC: 47 U/L — HIGH (ref 4–32)
AST SERPL-CCNC: 48 U/L — HIGH (ref 4–32)
BASOPHILS # BLD AUTO: 0.01 K/UL — SIGNIFICANT CHANGE UP (ref 0–0.2)
BASOPHILS NFR BLD AUTO: 0.1 % — SIGNIFICANT CHANGE UP (ref 0–2)
BILIRUB DIRECT SERPL-MCNC: <0.2 MG/DL — SIGNIFICANT CHANGE UP (ref 0–0.3)
BILIRUB INDIRECT FLD-MCNC: >0.2 MG/DL — SIGNIFICANT CHANGE UP (ref 0–1)
BILIRUB SERPL-MCNC: 0.4 MG/DL — SIGNIFICANT CHANGE UP (ref 0.2–1.2)
BILIRUB SERPL-MCNC: 0.4 MG/DL — SIGNIFICANT CHANGE UP (ref 0.2–1.2)
BUN SERPL-MCNC: 34 MG/DL — HIGH (ref 7–23)
CALCIUM SERPL-MCNC: 9.2 MG/DL — SIGNIFICANT CHANGE UP (ref 8.4–10.5)
CHLORIDE SERPL-SCNC: 102 MMOL/L — SIGNIFICANT CHANGE UP (ref 98–107)
CO2 SERPL-SCNC: 21 MMOL/L — LOW (ref 22–31)
CREAT SERPL-MCNC: 1.22 MG/DL — SIGNIFICANT CHANGE UP (ref 0.5–1.3)
CREAT SERPL-MCNC: 1.23 MG/DL — SIGNIFICANT CHANGE UP (ref 0.5–1.3)
EGFR: 46 ML/MIN/1.73M2 — LOW
EGFR: 46 ML/MIN/1.73M2 — LOW
EOSINOPHIL # BLD AUTO: 0.05 K/UL — SIGNIFICANT CHANGE UP (ref 0–0.5)
EOSINOPHIL NFR BLD AUTO: 0.6 % — SIGNIFICANT CHANGE UP (ref 0–6)
GLUCOSE SERPL-MCNC: 202 MG/DL — HIGH (ref 70–99)
HCT VFR BLD CALC: 31.2 % — LOW (ref 34.5–45)
HGB BLD-MCNC: 10.2 G/DL — LOW (ref 11.5–15.5)
IANC: 6.93 K/UL — SIGNIFICANT CHANGE UP (ref 1.8–7.4)
IMM GRANULOCYTES NFR BLD AUTO: 0.4 % — SIGNIFICANT CHANGE UP (ref 0–0.9)
LYMPHOCYTES # BLD AUTO: 1.46 K/UL — SIGNIFICANT CHANGE UP (ref 1–3.3)
LYMPHOCYTES # BLD AUTO: 16.1 % — SIGNIFICANT CHANGE UP (ref 13–44)
MCHC RBC-ENTMCNC: 25.2 PG — LOW (ref 27–34)
MCHC RBC-ENTMCNC: 32.7 GM/DL — SIGNIFICANT CHANGE UP (ref 32–36)
MCV RBC AUTO: 77.2 FL — LOW (ref 80–100)
MONOCYTES # BLD AUTO: 0.59 K/UL — SIGNIFICANT CHANGE UP (ref 0–0.9)
MONOCYTES NFR BLD AUTO: 6.5 % — SIGNIFICANT CHANGE UP (ref 2–14)
NEUTROPHILS # BLD AUTO: 6.93 K/UL — SIGNIFICANT CHANGE UP (ref 1.8–7.4)
NEUTROPHILS NFR BLD AUTO: 76.3 % — SIGNIFICANT CHANGE UP (ref 43–77)
NRBC # BLD: 0 /100 WBCS — SIGNIFICANT CHANGE UP (ref 0–0)
NRBC # FLD: 0 K/UL — SIGNIFICANT CHANGE UP (ref 0–0)
PLATELET # BLD AUTO: 284 K/UL — SIGNIFICANT CHANGE UP (ref 150–400)
POTASSIUM SERPL-MCNC: 4 MMOL/L — SIGNIFICANT CHANGE UP (ref 3.5–5.3)
POTASSIUM SERPL-SCNC: 4 MMOL/L — SIGNIFICANT CHANGE UP (ref 3.5–5.3)
PROT SERPL-MCNC: 6.6 G/DL — SIGNIFICANT CHANGE UP (ref 6–8.3)
PROT SERPL-MCNC: 6.7 G/DL — SIGNIFICANT CHANGE UP (ref 6–8.3)
RBC # BLD: 4.04 M/UL — SIGNIFICANT CHANGE UP (ref 3.8–5.2)
RBC # FLD: 16 % — HIGH (ref 10.3–14.5)
SODIUM SERPL-SCNC: 136 MMOL/L — SIGNIFICANT CHANGE UP (ref 135–145)
WBC # BLD: 9.08 K/UL — SIGNIFICANT CHANGE UP (ref 3.8–10.5)
WBC # FLD AUTO: 9.08 K/UL — SIGNIFICANT CHANGE UP (ref 3.8–10.5)

## 2022-11-17 PROCEDURE — 99232 SBSQ HOSP IP/OBS MODERATE 35: CPT

## 2022-11-17 RX ORDER — INSULIN GLARGINE 100 [IU]/ML
8 INJECTION, SOLUTION SUBCUTANEOUS AT BEDTIME
Refills: 0 | Status: DISCONTINUED | OUTPATIENT
Start: 2022-11-17 | End: 2022-11-20

## 2022-11-17 RX ORDER — PETROLATUM,WHITE
1 JELLY (GRAM) TOPICAL DAILY
Refills: 0 | Status: DISCONTINUED | OUTPATIENT
Start: 2022-11-17 | End: 2022-11-25

## 2022-11-17 RX ORDER — INSULIN LISPRO 100/ML
5 VIAL (ML) SUBCUTANEOUS
Refills: 0 | Status: DISCONTINUED | OUTPATIENT
Start: 2022-11-17 | End: 2022-11-20

## 2022-11-17 RX ADMIN — HEPARIN SODIUM 5000 UNIT(S): 5000 INJECTION INTRAVENOUS; SUBCUTANEOUS at 05:23

## 2022-11-17 RX ADMIN — HEPARIN SODIUM 5000 UNIT(S): 5000 INJECTION INTRAVENOUS; SUBCUTANEOUS at 21:50

## 2022-11-17 RX ADMIN — Medication 6 MILLIGRAM(S): at 05:24

## 2022-11-17 RX ADMIN — Medication 1200 MILLIGRAM(S): at 05:24

## 2022-11-17 RX ADMIN — REMDESIVIR 500 MILLIGRAM(S): 5 INJECTION INTRAVENOUS at 11:12

## 2022-11-17 RX ADMIN — Medication 5 UNIT(S): at 18:07

## 2022-11-17 RX ADMIN — Medication 150 MILLIGRAM(S): at 21:50

## 2022-11-17 RX ADMIN — Medication 1200 MILLIGRAM(S): at 18:10

## 2022-11-17 RX ADMIN — Medication 6: at 12:33

## 2022-11-17 RX ADMIN — Medication 81 MILLIGRAM(S): at 11:11

## 2022-11-17 RX ADMIN — Medication 3: at 09:17

## 2022-11-17 RX ADMIN — HEPARIN SODIUM 5000 UNIT(S): 5000 INJECTION INTRAVENOUS; SUBCUTANEOUS at 13:13

## 2022-11-17 RX ADMIN — Medication 1 APPLICATION(S): at 11:15

## 2022-11-17 RX ADMIN — INSULIN GLARGINE 8 UNIT(S): 100 INJECTION, SOLUTION SUBCUTANEOUS at 22:48

## 2022-11-17 RX ADMIN — Medication 3: at 18:08

## 2022-11-17 RX ADMIN — Medication 1000 UNIT(S): at 11:12

## 2022-11-17 NOTE — PROGRESS NOTE ADULT - ASSESSMENT
COVID-19  hypoxic respiratory failure  troponin elevation  CPK elevation 2/2 viral illness/myonecrosis    mildly elevated hs-troponin in the setting of decreased renal function and COVID-19 infection. ECG without acute ST changes.  TTE wnl  not ACS and can hold ACS-specific treatments  continue COVID-19 specific supportive care  continue tele monitoring given the patient is at significantly increased risk of arrhythmia

## 2022-11-17 NOTE — CHART NOTE - NSCHARTNOTEFT_GEN_A_CORE
Pt noted with hyperglycemia >400, Pt seen and examined, reports feeling better each day, O2 sat 95% on RA, c/o cough with some sputum production, very small amount of sputum noted at bedside on tissue with small blood tinge, Dr. Quesada aware, will continue to monitor.     -441, changed diet to consistent carb diet, d/w Dr. Quesada, ok to increase Lantus to 8units qhs and recommend to start pre-meal Ademelog 5/5/5. Continue ISS. Hyperglycemia exacerbated d/t Dexamethasone for treatment of COVID-19. Will continue to monitor and adjust as needed.

## 2022-11-17 NOTE — PROGRESS NOTE ADULT - ASSESSMENT
77 year old female, PMH of covid 19 4/2020, HTN, HLD, NIDDM, former smoker, quit 15 years ago, vaccinated for covid and boosted in the summer, presents to ER with symptoms of dizziness and weak almost fell down  covid 19  elevated troponin    COVID infection  cxr without focal consolidation   initially required O2  c/w remdesivir for 5 day course, day 4  trend liver enzymes while on remdesivir  on steroids  ac ppx  trend biomarkers  monitor SpO2  supportive care- Cepacol for sore throat, mucinex  isolation per infection control policy     fever  likely 2/2 covid  f/u blood cultures  denies dysuria  monitor off antibiotics at this time  trend wbc, temps    leukocytosis   likely 2/2 steroids    Simon Rodriguez M.D.  [facility], Division of Infectious Diseases  [phone]  After 5pm on weekdays and all day on weekends - please call [phone]

## 2022-11-17 NOTE — PROGRESS NOTE ADULT - ASSESSMENT
77 year old female, PMH of HTN, HLD, NIDDM, former smoker, quit 15 years ago, vaccinated for covid and boosted in the summer, presents to ER with symptoms of shortness of breath, cough, congestion, nausea, no vomiting, loose stool. Per pt, she was in usual state of health up until  2 days ago. Last night, patient was taken to  and tested positive for Covid. She was told EKG was abnormal so she came to ED for evaluation. Pt has no current reports of chest pain but endorses weakness and feels like she may pass out, no syncopal episodes. In ER, EKG with no signs of ACS. Cardiac enzymes and troponin are positive. Troponin 169, delta 173. CPK mildly elevated to 329, CKMB 4.6 index is 1.4.  Patient has multiple sick contacts at home,  is flu positive.  Cardiology consulted for possible NSTEMI in setting of covid. Start Remdesivir and Decadron. ID consulted.     Acute respiratory failure with hypoxia due to Covid19  - vaccinated and boosted  - given risk factors, started IV Remdesivir and decadron  - risk benefits discussed  - monitor inflammatory markers  - CXR no consolidation  - wean nasal canula O2 as tolerates  - Cepacol and antitussives PRN     Chest pain, elevated  trop  - likely demand ischemia per cardiology  - Trop stable x 3  - c/w Asa, hep gtt (s/p Brilinta loaded x 1)  (takes Pravastatin at home). Intolerance to other statins? listed as allergy  - TTE w/o any segmental LV wall motion abnormalities  - house card appreciated    Diabetes  - hold home metformin and Glimiperide  - hgbA1c = 7.7  - Insulin Lantus 5 units  - sliding scale. can add premeal humalog if needed    Hypertension  - BP stable  - hold lisinopril given mildly elevated Cr    Acute kidney failure  - unclear baseline  - will review outpt EMR  - hold ACEI for now.     DVT ppx

## 2022-11-17 NOTE — PHYSICAL THERAPY INITIAL EVALUATION ADULT - ADDITIONAL COMMENTS
Patient lives with  in private home, 13 steps to get to second floor where bedroom/bathroom is located. Patient was using a cane prior to admission. Patient was independent in all ADL prior to admission. Patients  cannot assist if needed.

## 2022-11-17 NOTE — PROGRESS NOTE ADULT - ASSESSMENT
77 year old female, PMH of HTN, HLD, NIDDM, former smoker, quit 15 years ago, vaccinated for covid and boosted in the summer, presents to ER with symptoms of shortness of breath, cough, congestion, nausea, no vomiting, loose stool. Per pt, she was in usual state of health up until  2 days ago. Last night, patient was taken to  and tested positive for Covid. She was told EKG was abnormal so she came to ED for evaluation. Pt has no current reports of chest pain but endorses weakness and feels like she may pass out, no syncopal episodes. In ER, EKG with no signs of ACS. Cardiac enzymes and troponin are positive. Troponin 169, delta 173. CPK mildly elevated to 329, CKMB 4.6 index is 1.4.  Patient has multiple sick contacts at home,  is flu positive.  Cardiology consulted for possible NSTEMI in setting of covid. Start Remdesivir and Decadron. ID consulted.     Acute respiratory failure with hypoxia due to Covid19  Chest pain, elevated  trop  Diabetes  Hypertension  Acute kidney failure  DVT ppx      11/14/2022:    Acute respiratory failure with hypoxia due to Covid19: she is using 2 L of oxygen : agree with remdesivir as well as dexa: mantain o2 sao2 above 935 all the time:  check d dimer: ordered:  Follow LFTs and renal profile while on remdesivir:   Chest pain, elevated  trop : ? demand ischemia:  would defer to cards / PMD   Diabetes : monitor and control on dexa:   Hypertension ; controlled  Acute kidney failure : creat is downtrending:  cont to follow   DVT ppx    [facility]     11/15:    Acute respiratory failure with hypoxia due to Covid19: she is using 2 L of oxygen  but currently off oxygen : monitor o2 saio2:  keep above 92% all the time:  : agree with remdesivir as well as dexa:  check d dimer: ordered:  Follow LFTs and renal profile while on remdesivir:   Chest pain, elevated  trop : ? demand ischemia:  would defer to cards / PMD   Diabetes : monitor and control on dexa:   Hypertension ; controlled  Acute kidney failure : creat is downtrending:  cont to follow   DVT ppx  ? start lovenox: [facility]     11/16:    Acute respiratory failure with hypoxia due to Covid19: she is using 2 L of oxygen  but currently off oxygen : monitor o2 saio2:  keep above 92% all the time:  : agree with remdesivir as well as dexa:  trend d dimer:   Follow LFTs and renal profile while on remdesivir:   Chest pain, elevated  trop : ? demand ischemia:  would defer to cards / PMD : resolved  Diabetes : monitor and control on dexa:   Hypertension ; controlled  Acute kidney failure : creat is downtrending:  cont to follow   DVT ppx  : on heparin sq tid  dw [facility]     11/17:    Acute respiratory failure with hypoxia due to Covid19: off oxygen  : monitor o2 saio2:  keep above 92% all the time:  : On  remdesivir as well as dexa:  trend d dimer: slightly high :  Chest pain, elevated  trop : ? demand ischemia:  would defer to cards / PMD : resolved  Diabetes : monitor and control on dexa:   Hypertension ; controlled  Acute kidney failure : creat is downtrending:  cont to follow   DVT ppx  : on heparin sq tid   acp

## 2022-11-17 NOTE — PHYSICAL THERAPY INITIAL EVALUATION ADULT - PERTINENT HX OF CURRENT PROBLEM, REHAB EVAL
77 year old female, PMH of HTN, HLD, NIDDM, former smoker, quit 15 years ago, vaccinated for covid and boosted in the summer, presents to ER with symptoms of shortness of breath, cough, congestion, nausea, no vomiting, loose stool. Per pt, she was in usual state of health up until  2 days ago. Last night, patient was taken to UC and tested positive for Covid. She was told EKG was abnormal so she came to ED for evaluation.

## 2022-11-18 LAB
ALBUMIN SERPL ELPH-MCNC: 3.2 G/DL — LOW (ref 3.3–5)
ALP SERPL-CCNC: 83 U/L — SIGNIFICANT CHANGE UP (ref 40–120)
ALT FLD-CCNC: 32 U/L — SIGNIFICANT CHANGE UP (ref 4–33)
ANION GAP SERPL CALC-SCNC: 13 MMOL/L — SIGNIFICANT CHANGE UP (ref 7–14)
AST SERPL-CCNC: 29 U/L — SIGNIFICANT CHANGE UP (ref 4–32)
BASOPHILS # BLD AUTO: 0.01 K/UL — SIGNIFICANT CHANGE UP (ref 0–0.2)
BASOPHILS NFR BLD AUTO: 0.1 % — SIGNIFICANT CHANGE UP (ref 0–2)
BILIRUB DIRECT SERPL-MCNC: <0.2 MG/DL — SIGNIFICANT CHANGE UP (ref 0–0.3)
BILIRUB INDIRECT FLD-MCNC: >0.1 MG/DL — SIGNIFICANT CHANGE UP (ref 0–1)
BILIRUB SERPL-MCNC: 0.3 MG/DL — SIGNIFICANT CHANGE UP (ref 0.2–1.2)
BUN SERPL-MCNC: 40 MG/DL — HIGH (ref 7–23)
CALCIUM SERPL-MCNC: 9.5 MG/DL — SIGNIFICANT CHANGE UP (ref 8.4–10.5)
CHLORIDE SERPL-SCNC: 105 MMOL/L — SIGNIFICANT CHANGE UP (ref 98–107)
CO2 SERPL-SCNC: 18 MMOL/L — LOW (ref 22–31)
CREAT SERPL-MCNC: 1.09 MG/DL — SIGNIFICANT CHANGE UP (ref 0.5–1.3)
CREAT SERPL-MCNC: 1.11 MG/DL — SIGNIFICANT CHANGE UP (ref 0.5–1.3)
CRP SERPL-MCNC: 101.4 MG/L — HIGH
D DIMER BLD IA.RAPID-MCNC: 410 NG/ML DDU — HIGH
EGFR: 52 ML/MIN/1.73M2 — LOW
EGFR: 53 ML/MIN/1.73M2 — LOW
EOSINOPHIL # BLD AUTO: 0.08 K/UL — SIGNIFICANT CHANGE UP (ref 0–0.5)
EOSINOPHIL NFR BLD AUTO: 0.7 % — SIGNIFICANT CHANGE UP (ref 0–6)
FERRITIN SERPL-MCNC: 155 NG/ML — HIGH (ref 15–150)
GLUCOSE SERPL-MCNC: 206 MG/DL — HIGH (ref 70–99)
HCT VFR BLD CALC: 33.5 % — LOW (ref 34.5–45)
HGB BLD-MCNC: 10.7 G/DL — LOW (ref 11.5–15.5)
IANC: 7.55 K/UL — HIGH (ref 1.8–7.4)
IMM GRANULOCYTES NFR BLD AUTO: 0.6 % — SIGNIFICANT CHANGE UP (ref 0–0.9)
LDH SERPL L TO P-CCNC: 288 U/L — HIGH (ref 135–225)
LYMPHOCYTES # BLD AUTO: 2.58 K/UL — SIGNIFICANT CHANGE UP (ref 1–3.3)
LYMPHOCYTES # BLD AUTO: 23.4 % — SIGNIFICANT CHANGE UP (ref 13–44)
MAGNESIUM SERPL-MCNC: 1.5 MG/DL — LOW (ref 1.6–2.6)
MCHC RBC-ENTMCNC: 25.2 PG — LOW (ref 27–34)
MCHC RBC-ENTMCNC: 31.9 GM/DL — LOW (ref 32–36)
MCV RBC AUTO: 78.8 FL — LOW (ref 80–100)
MONOCYTES # BLD AUTO: 0.73 K/UL — SIGNIFICANT CHANGE UP (ref 0–0.9)
MONOCYTES NFR BLD AUTO: 6.6 % — SIGNIFICANT CHANGE UP (ref 2–14)
NEUTROPHILS # BLD AUTO: 7.55 K/UL — HIGH (ref 1.8–7.4)
NEUTROPHILS NFR BLD AUTO: 68.6 % — SIGNIFICANT CHANGE UP (ref 43–77)
NRBC # BLD: 0 /100 WBCS — SIGNIFICANT CHANGE UP (ref 0–0)
NRBC # FLD: 0 K/UL — SIGNIFICANT CHANGE UP (ref 0–0)
PHOSPHATE SERPL-MCNC: 3 MG/DL — SIGNIFICANT CHANGE UP (ref 2.5–4.5)
PLATELET # BLD AUTO: 345 K/UL — SIGNIFICANT CHANGE UP (ref 150–400)
POTASSIUM SERPL-MCNC: 4.4 MMOL/L — SIGNIFICANT CHANGE UP (ref 3.5–5.3)
POTASSIUM SERPL-SCNC: 4.4 MMOL/L — SIGNIFICANT CHANGE UP (ref 3.5–5.3)
PROT SERPL-MCNC: 6.7 G/DL — SIGNIFICANT CHANGE UP (ref 6–8.3)
RBC # BLD: 4.25 M/UL — SIGNIFICANT CHANGE UP (ref 3.8–5.2)
RBC # FLD: 16.3 % — HIGH (ref 10.3–14.5)
SODIUM SERPL-SCNC: 136 MMOL/L — SIGNIFICANT CHANGE UP (ref 135–145)
WBC # BLD: 11.02 K/UL — HIGH (ref 3.8–10.5)
WBC # FLD AUTO: 11.02 K/UL — HIGH (ref 3.8–10.5)

## 2022-11-18 PROCEDURE — 99232 SBSQ HOSP IP/OBS MODERATE 35: CPT

## 2022-11-18 RX ORDER — INSULIN LISPRO 100/ML
VIAL (ML) SUBCUTANEOUS AT BEDTIME
Refills: 0 | Status: DISCONTINUED | OUTPATIENT
Start: 2022-11-18 | End: 2022-11-25

## 2022-11-18 RX ORDER — MAGNESIUM OXIDE 400 MG ORAL TABLET 241.3 MG
400 TABLET ORAL ONCE
Refills: 0 | Status: COMPLETED | OUTPATIENT
Start: 2022-11-18 | End: 2022-11-18

## 2022-11-18 RX ORDER — MECLIZINE HCL 12.5 MG
25 TABLET ORAL ONCE
Refills: 0 | Status: COMPLETED | OUTPATIENT
Start: 2022-11-18 | End: 2022-11-18

## 2022-11-18 RX ADMIN — Medication 1: at 23:22

## 2022-11-18 RX ADMIN — REMDESIVIR 500 MILLIGRAM(S): 5 INJECTION INTRAVENOUS at 11:31

## 2022-11-18 RX ADMIN — INSULIN GLARGINE 8 UNIT(S): 100 INJECTION, SOLUTION SUBCUTANEOUS at 23:03

## 2022-11-18 RX ADMIN — BENZOCAINE AND MENTHOL 1 LOZENGE: 5; 1 LIQUID ORAL at 13:10

## 2022-11-18 RX ADMIN — Medication 150 MILLIGRAM(S): at 22:41

## 2022-11-18 RX ADMIN — Medication 5 UNIT(S): at 17:57

## 2022-11-18 RX ADMIN — Medication 100 MILLIGRAM(S): at 22:43

## 2022-11-18 RX ADMIN — Medication 1000 UNIT(S): at 11:31

## 2022-11-18 RX ADMIN — Medication 1 APPLICATION(S): at 11:31

## 2022-11-18 RX ADMIN — MAGNESIUM OXIDE 400 MG ORAL TABLET 400 MILLIGRAM(S): 241.3 TABLET ORAL at 11:30

## 2022-11-18 RX ADMIN — Medication 1200 MILLIGRAM(S): at 06:48

## 2022-11-18 RX ADMIN — Medication 5: at 13:11

## 2022-11-18 RX ADMIN — Medication 81 MILLIGRAM(S): at 11:31

## 2022-11-18 RX ADMIN — Medication 4: at 17:56

## 2022-11-18 RX ADMIN — Medication 2: at 09:22

## 2022-11-18 RX ADMIN — Medication 5 UNIT(S): at 13:11

## 2022-11-18 RX ADMIN — HEPARIN SODIUM 5000 UNIT(S): 5000 INJECTION INTRAVENOUS; SUBCUTANEOUS at 06:00

## 2022-11-18 RX ADMIN — Medication 5 UNIT(S): at 09:22

## 2022-11-18 RX ADMIN — HEPARIN SODIUM 5000 UNIT(S): 5000 INJECTION INTRAVENOUS; SUBCUTANEOUS at 22:44

## 2022-11-18 RX ADMIN — Medication 6 MILLIGRAM(S): at 06:00

## 2022-11-18 RX ADMIN — Medication 25 MILLIGRAM(S): at 13:10

## 2022-11-18 RX ADMIN — Medication 3 MILLIGRAM(S): at 22:41

## 2022-11-18 RX ADMIN — Medication 1200 MILLIGRAM(S): at 17:59

## 2022-11-18 RX ADMIN — HEPARIN SODIUM 5000 UNIT(S): 5000 INJECTION INTRAVENOUS; SUBCUTANEOUS at 13:11

## 2022-11-18 NOTE — PROGRESS NOTE ADULT - ASSESSMENT
COVID-19  hypoxic respiratory failure  troponin elevation  CPK elevation 2/2 viral illness/myonecrosis  mildly elevated hs-troponin in the setting of decreased renal function and COVID-19 infection. ECG without acute ST changes.  TTE wnl  not ACS and can hold ACS-specific treatments  continue COVID-19 specific supportive care

## 2022-11-18 NOTE — PROGRESS NOTE ADULT - ASSESSMENT
77 year old female, PMH of HTN, HLD, NIDDM, former smoker, quit 15 years ago, vaccinated for covid and boosted in the summer, presents to ER with symptoms of shortness of breath, cough, congestion, nausea, no vomiting, loose stool. Per pt, she was in usual state of health up until  2 days ago. Last night, patient was taken to  and tested positive for Covid. She was told EKG was abnormal so she came to ED for evaluation. Pt has no current reports of chest pain but endorses weakness and feels like she may pass out, no syncopal episodes. In ER, EKG with no signs of ACS. Cardiac enzymes and troponin are positive. Troponin 169, delta 173. CPK mildly elevated to 329, CKMB 4.6 index is 1.4.  Patient has multiple sick contacts at home,  is flu positive.  Cardiology consulted for possible NSTEMI in setting of covid. Start Remdesivir and Decadron. ID consulted.     Acute respiratory failure with hypoxia due to Covid19  Chest pain, elevated  trop  Diabetes  Hypertension  Acute kidney failure  DVT ppx      11/14/2022:    Acute respiratory failure with hypoxia due to Covid19: she is using 2 L of oxygen : agree with remdesivir as well as dexa: mantain o2 sao2 above 935 all the time:  check d dimer: ordered:  Follow LFTs and renal profile while on remdesivir:   Chest pain, elevated  trop : ? demand ischemia:  would defer to cards / PMD   Diabetes : monitor and control on dexa:   Hypertension ; controlled  Acute kidney failure : creat is downtrending:  cont to follow   DVT ppx    [facility]     11/15:    Acute respiratory failure with hypoxia due to Covid19: she is using 2 L of oxygen  but currently off oxygen : monitor o2 saio2:  keep above 92% all the time:  : agree with remdesivir as well as dexa:  check d dimer: ordered:  Follow LFTs and renal profile while on remdesivir:   Chest pain, elevated  trop : ? demand ischemia:  would defer to cards / PMD   Diabetes : monitor and control on dexa:   Hypertension ; controlled  Acute kidney failure : creat is downtrending:  cont to follow   DVT ppx  ? start lovenox: [facility]     11/16:    Acute respiratory failure with hypoxia due to Covid19: she is using 2 L of oxygen  but currently off oxygen : monitor o2 saio2:  keep above 92% all the time:  : agree with remdesivir as well as dexa:  trend d dimer:   Follow LFTs and renal profile while on remdesivir:   Chest pain, elevated  trop : ? demand ischemia:  would defer to cards / PMD : resolved  Diabetes : monitor and control on dexa:   Hypertension ; controlled  Acute kidney failure : creat is downtrending:  cont to follow   DVT ppx  : on heparin sq tid  [facility]     11/17:    Acute respiratory failure with hypoxia due to Covid19: off oxygen  : monitor o2 saio2:  keep above 92% all the time:  : finished   remdesivir andon  dexa:  trend d dimer: slightly high :  Chest pain, elevated  trop : ? demand ischemia:  would defer to cards / PMD : resolved  Diabetes : monitor and control on dexa:   Hypertension ; controlled  Acute kidney failure : creat is downtrending:  cont to follow   DVT ppx  : on heparin sq tid  [facility]    11/18:    Acute respiratory failure with hypoxia due to Covid19: off oxygen  : monitor o2 sa o2 above 92% all the time:  : On  remdesivir as well as dexa:  trend d dimer: slightly decreased today   Chest pain, elevated  trop : ? demand ischemia:  would defer to cards / PMD : resolved  Diabetes : monitor and control on dexa:   Hypertension ; controlled  Acute kidney failure : creat is downtrending:  cont to follow   DVT ppx  : on heparin sq tid  [facility]

## 2022-11-18 NOTE — PROGRESS NOTE ADULT - ASSESSMENT
77 year old female, PMH of HTN, HLD, NIDDM, former smoker, quit 15 years ago, vaccinated for covid and boosted in the summer, presents to ER with symptoms of shortness of breath, cough, congestion, nausea, no vomiting, loose stool. Per pt, she was in usual state of health up until  2 days ago. Last night, patient was taken to  and tested positive for Covid. She was told EKG was abnormal so she came to ED for evaluation. Pt has no current reports of chest pain but endorses weakness and feels like she may pass out, no syncopal episodes. In ER, EKG with no signs of ACS. Cardiac enzymes and troponin are positive. Troponin 169, delta 173. CPK mildly elevated to 329, CKMB 4.6 index is 1.4.  Patient has multiple sick contacts at home,  is flu positive.  Cardiology consulted for possible NSTEMI in setting of covid. Start Remdesivir and Decadron. ID consulted.     Acute respiratory failure with hypoxia due to Covid19  - vaccinated and boosted  - given risk factors, started IV Remdesivir and decadron  - risk benefits discussed  - monitor inflammatory markers  - CXR no consolidation  - wean nasal canula O2 as tolerates  - Cepacol and antitussives PRN     Chest pain, elevated  trop  - likely demand ischemia per cardiology  - Trop stable x 3  - c/w Asa, hep gtt (s/p Brilinta loaded x 1)  (takes Pravastatin at home). Intolerance to other statins? listed as allergy  - TTE w/o any segmental LV wall motion abnormalities  - house card appreciated    BPV  - trial of meclizine    Diabetes mellitus type 2 w/unspecified complications  - hold home metformin and Glimiperide  - hgbA1c = 7.7  - Insulin Lantus 5 units  - sliding scale. cont with basal/bolus insulin as ordered    Hypertension  - BP stable  - hold lisinopril given mildly elevated Cr    Acute kidney failure  - unclear baseline  - will review outpt EMR  - hold ACEI for now.   - resolved    DVT ppx

## 2022-11-18 NOTE — PROGRESS NOTE ADULT - ASSESSMENT
77 year old female, PMH of covid 19 4/2020, HTN, HLD, NIDDM, former smoker, quit 15 years ago, vaccinated for covid and boosted in the summer, presents to ER with symptoms of dizziness and weak almost fell down  covid 19    COVID infection  cxr without focal consolidation   initially required O2  c/w remdesivir for 5 day course, last day today  trend liver enzymes while on remdesivir  on steroids  ac ppx  trend biomarkers  monitor SpO2  supportive care  isolation per infection control policy   discharge planning    fever- resolved  likely 2/2 covid  f/u blood cultures- NGTD  denies dysuria  not indication for antibiotics at this time    leukocytosis   likely 2/2 steroids    Simon Rodriguez M.D.  [facility], Division of Infectious Diseases  [phone]  After 5pm on weekdays and all day on weekends - please call [phone]

## 2022-11-19 LAB
ALBUMIN SERPL ELPH-MCNC: 3 G/DL — LOW (ref 3.3–5)
ALBUMIN SERPL ELPH-MCNC: 3.1 G/DL — LOW (ref 3.3–5)
ALP SERPL-CCNC: 75 U/L — SIGNIFICANT CHANGE UP (ref 40–120)
ALP SERPL-CCNC: 76 U/L — SIGNIFICANT CHANGE UP (ref 40–120)
ALT FLD-CCNC: 28 U/L — SIGNIFICANT CHANGE UP (ref 4–33)
ALT FLD-CCNC: 28 U/L — SIGNIFICANT CHANGE UP (ref 4–33)
ANION GAP SERPL CALC-SCNC: 11 MMOL/L — SIGNIFICANT CHANGE UP (ref 7–14)
AST SERPL-CCNC: 22 U/L — SIGNIFICANT CHANGE UP (ref 4–32)
AST SERPL-CCNC: 42 U/L — HIGH (ref 4–32)
BILIRUB DIRECT SERPL-MCNC: <0.2 MG/DL — SIGNIFICANT CHANGE UP (ref 0–0.3)
BILIRUB INDIRECT FLD-MCNC: >0 MG/DL — SIGNIFICANT CHANGE UP (ref 0–1)
BILIRUB SERPL-MCNC: 0.2 MG/DL — SIGNIFICANT CHANGE UP (ref 0.2–1.2)
BILIRUB SERPL-MCNC: 0.2 MG/DL — SIGNIFICANT CHANGE UP (ref 0.2–1.2)
BUN SERPL-MCNC: 43 MG/DL — HIGH (ref 7–23)
CALCIUM SERPL-MCNC: 8.9 MG/DL — SIGNIFICANT CHANGE UP (ref 8.4–10.5)
CHLORIDE SERPL-SCNC: 105 MMOL/L — SIGNIFICANT CHANGE UP (ref 98–107)
CO2 SERPL-SCNC: 19 MMOL/L — LOW (ref 22–31)
CREAT SERPL-MCNC: 1.19 MG/DL — SIGNIFICANT CHANGE UP (ref 0.5–1.3)
CREAT SERPL-MCNC: 1.2 MG/DL — SIGNIFICANT CHANGE UP (ref 0.5–1.3)
EGFR: 47 ML/MIN/1.73M2 — LOW
EGFR: 48 ML/MIN/1.73M2 — LOW
GLUCOSE SERPL-MCNC: 212 MG/DL — HIGH (ref 70–99)
HCT VFR BLD CALC: 29.7 % — LOW (ref 34.5–45)
HGB BLD-MCNC: 9.5 G/DL — LOW (ref 11.5–15.5)
MAGNESIUM SERPL-MCNC: 1.6 MG/DL — SIGNIFICANT CHANGE UP (ref 1.6–2.6)
MCHC RBC-ENTMCNC: 25 PG — LOW (ref 27–34)
MCHC RBC-ENTMCNC: 32 GM/DL — SIGNIFICANT CHANGE UP (ref 32–36)
MCV RBC AUTO: 78.2 FL — LOW (ref 80–100)
NRBC # BLD: 0 /100 WBCS — SIGNIFICANT CHANGE UP (ref 0–0)
NRBC # FLD: 0 K/UL — SIGNIFICANT CHANGE UP (ref 0–0)
PLATELET # BLD AUTO: 366 K/UL — SIGNIFICANT CHANGE UP (ref 150–400)
POTASSIUM SERPL-MCNC: 5 MMOL/L — SIGNIFICANT CHANGE UP (ref 3.5–5.3)
POTASSIUM SERPL-SCNC: 5 MMOL/L — SIGNIFICANT CHANGE UP (ref 3.5–5.3)
PROT SERPL-MCNC: 6.1 G/DL — SIGNIFICANT CHANGE UP (ref 6–8.3)
PROT SERPL-MCNC: 6.1 G/DL — SIGNIFICANT CHANGE UP (ref 6–8.3)
RBC # BLD: 3.8 M/UL — SIGNIFICANT CHANGE UP (ref 3.8–5.2)
RBC # FLD: 16.2 % — HIGH (ref 10.3–14.5)
SODIUM SERPL-SCNC: 135 MMOL/L — SIGNIFICANT CHANGE UP (ref 135–145)
WBC # BLD: 11.33 K/UL — HIGH (ref 3.8–10.5)
WBC # FLD AUTO: 11.33 K/UL — HIGH (ref 3.8–10.5)

## 2022-11-19 RX ADMIN — Medication 5 UNIT(S): at 18:06

## 2022-11-19 RX ADMIN — Medication 81 MILLIGRAM(S): at 12:49

## 2022-11-19 RX ADMIN — Medication 150 MILLIGRAM(S): at 22:09

## 2022-11-19 RX ADMIN — Medication 5 UNIT(S): at 12:56

## 2022-11-19 RX ADMIN — Medication 5: at 12:55

## 2022-11-19 RX ADMIN — Medication 5: at 18:05

## 2022-11-19 RX ADMIN — HEPARIN SODIUM 5000 UNIT(S): 5000 INJECTION INTRAVENOUS; SUBCUTANEOUS at 22:09

## 2022-11-19 RX ADMIN — Medication 5 UNIT(S): at 09:10

## 2022-11-19 RX ADMIN — HEPARIN SODIUM 5000 UNIT(S): 5000 INJECTION INTRAVENOUS; SUBCUTANEOUS at 05:05

## 2022-11-19 RX ADMIN — INSULIN GLARGINE 8 UNIT(S): 100 INJECTION, SOLUTION SUBCUTANEOUS at 22:09

## 2022-11-19 RX ADMIN — Medication 1200 MILLIGRAM(S): at 17:18

## 2022-11-19 RX ADMIN — Medication 2: at 22:10

## 2022-11-19 RX ADMIN — Medication 3: at 09:11

## 2022-11-19 RX ADMIN — HEPARIN SODIUM 5000 UNIT(S): 5000 INJECTION INTRAVENOUS; SUBCUTANEOUS at 12:50

## 2022-11-19 RX ADMIN — Medication 1200 MILLIGRAM(S): at 05:06

## 2022-11-19 RX ADMIN — Medication 1 APPLICATION(S): at 12:50

## 2022-11-19 RX ADMIN — Medication 6 MILLIGRAM(S): at 05:06

## 2022-11-19 RX ADMIN — Medication 100 MILLIGRAM(S): at 05:05

## 2022-11-19 RX ADMIN — Medication 100 MILLIGRAM(S): at 22:09

## 2022-11-19 RX ADMIN — Medication 1000 UNIT(S): at 12:48

## 2022-11-19 NOTE — CONSULT NOTE ADULT - CONSULT REASON
sob:  covid infection
covid 19
dizziness
elevated troponin and CPK in setting of covid, abnormal EKG

## 2022-11-19 NOTE — PROGRESS NOTE ADULT - ASSESSMENT
77 year old female, PMH of HTN, HLD, NIDDM, former smoker, quit 15 years ago, vaccinated for covid and boosted in the summer, presents to ER with symptoms of shortness of breath, cough, congestion, nausea, no vomiting, loose stool. Per pt, she was in usual state of health up until  2 days ago. Last night, patient was taken to  and tested positive for Covid. She was told EKG was abnormal so she came to ED for evaluation. Pt has no current reports of chest pain but endorses weakness and feels like she may pass out, no syncopal episodes. In ER, EKG with no signs of ACS. Cardiac enzymes and troponin are positive. Troponin 169, delta 173. CPK mildly elevated to 329, CKMB 4.6 index is 1.4.  Patient has multiple sick contacts at home,  is flu positive.  Cardiology consulted for possible NSTEMI in setting of covid. Start Remdesivir and Decadron. ID consulted.     Acute respiratory failure with hypoxia due to Covid19  Chest pain, elevated  trop  Diabetes  Hypertension  Acute kidney failure  DVT ppx      11/14/2022:    Acute respiratory failure with hypoxia due to Covid19: she is using 2 L of oxygen : agree with remdesivir as well as dexa: mantain o2 sao2 above 935 all the time:  check d dimer: ordered:  Follow LFTs and renal profile while on remdesivir:   Chest pain, elevated  trop : ? demand ischemia:  would defer to cards / PMD   Diabetes : monitor and control on dexa:   Hypertension ; controlled  Acute kidney failure : creat is downtrending:  cont to follow   DVT ppx    [facility]     11/15:    Acute respiratory failure with hypoxia due to Covid19: she is using 2 L of oxygen  but currently off oxygen : monitor o2 saio2:  keep above 92% all the time:  : agree with remdesivir as well as dexa:  check d dimer: ordered:  Follow LFTs and renal profile while on remdesivir:   Chest pain, elevated  trop : ? demand ischemia:  would defer to cards / PMD   Diabetes : monitor and control on dexa:   Hypertension ; controlled  Acute kidney failure : creat is downtrending:  cont to follow   DVT ppx  ? start lovenox: [facility]     11/16:    Acute respiratory failure with hypoxia due to Covid19: she is using 2 L of oxygen  but currently off oxygen : monitor o2 saio2:  keep above 92% all the time:  : agree with remdesivir as well as dexa:  trend d dimer:   Follow LFTs and renal profile while on remdesivir:   Chest pain, elevated  trop : ? demand ischemia:  would defer to cards / PMD : resolved  Diabetes : monitor and control on dexa:   Hypertension ; controlled  Acute kidney failure : creat is downtrending:  cont to follow   DVT ppx  : on heparin sq tid  [facility]     11/17:    Acute respiratory failure with hypoxia due to Covid19: off oxygen  : monitor o2 saio2:  keep above 92% all the time:  : finished   remdesivir andon  dexa:  trend d dimer: slightly high :  Chest pain, elevated  trop : ? demand ischemia:  would defer to cards / PMD : resolved  Diabetes : monitor and control on dexa:   Hypertension ; controlled  Acute kidney failure : creat is downtrending:  cont to follow   DVT ppx  : on heparin sq tid  [facility]    11/18:    Acute respiratory failure with hypoxia due to Covid19: off oxygen  : monitor o2 sa o2 above 92% all the time:  : On  remdesivir as well as dexa:  trend d dimer: slightly decreased today   Chest pain, elevated  trop : ? demand ischemia:  would defer to cards / PMD : resolved  Diabetes : monitor and control on dexa:   Hypertension ; controlled  Acute kidney failure : creat is downtrending:  cont to follow   DVT ppx  : on heparin sq tid  [facility]    11/19:    Acute respiratory failure with hypoxia due to Covid19: off oxygen  : monitor o2 sa o2 above 92% all the time:  : finished   remdesivir and on  dexa:  trend d dimer: slightly decreased :  VERTIGO: defer to primary team : neuro to see  Chest pain, elevated  trop : ? demand ischemia:  would defer to cards / PMD : resolved  Diabetes : monitor and control on dexa:   Hypertension ; controlled  Acute kidney failure : creat is downtrending:  cont to follow   DVT ppx  : on heparin sq tid  [facility]

## 2022-11-19 NOTE — CONSULT NOTE ADULT - SUBJECTIVE AND OBJECTIVE BOX
[facility]), [facility]        Patient is a 77y old  Female who presents with a chief complaint of COVID, weakness (15 Nov 2022 11:37)    Excerpt from H&P,"   77 year old female, PMH of HTN, HLD, NIDDM, former smoker, quit 15 years ago, vaccinated for covid and boosted in the summer, presents to ER with symptoms of shortness of breath, cough, congestion, nausea, no vomiting, loose stool. Per pt, she was in usual state of health up until  2 days ago. Last night, patient was taken to  and tested positive for Covid. She was told EKG was abnormal so she came to ED for evaluation. Pt has no current reports of chest pain but endorses weakness and feels like she may pass out, no syncopal episodes. In ER, EKG with no signs of ACS. Cardiac enzymes and troponin are positive. Troponin 169, delta 173. CPK mildly elevated to 329, CKMB 4.6 index is 1.4.  Patient has multiple sick contacts at home,  is flu positive.  Cardiology consulted for possible NSTEMI in setting of covid. Start Remdesivir and Decadron. ID consulted.    (14 Nov 2022 11:37)           *****PAST MEDICAL / Surgical  HISTORY:  PAST MEDICAL & SURGICAL HISTORY:           *****FAMILY HISTORY:  FAMILY HISTORY:           *****SOCIAL HISTORY:  Alcohol: None  Smoking: None         *****ALLERGIES:   Allergies    HMG-CoA reductase inhibitors (Unknown)    Intolerances             *****MEDICATIONS: current medication reviewed and documented.   MEDICATIONS  (STANDING):  aspirin enteric coated 81 milliGRAM(s) Oral daily  cholecalciferol 1000 Unit(s) Oral daily  dexAMETHasone  Injectable 6 milliGRAM(s) IV Push daily  dextrose 5%. 1000 milliLiter(s) (50 mL/Hr) IV Continuous <Continuous>  dextrose 5%. 1000 milliLiter(s) (100 mL/Hr) IV Continuous <Continuous>  dextrose 50% Injectable 25 Gram(s) IV Push once  dextrose 50% Injectable 12.5 Gram(s) IV Push once  dextrose 50% Injectable 25 Gram(s) IV Push once  glucagon  Injectable 1 milliGRAM(s) IntraMuscular once  guaiFENesin ER 1200 milliGRAM(s) Oral every 12 hours  heparin   Injectable 5000 Unit(s) SubCutaneous every 8 hours  influenza  Vaccine (HIGH DOSE) 0.7 milliLiter(s) IntraMuscular once  insulin glargine Injectable (LANTUS) 12 Unit(s) SubCutaneous at bedtime  insulin lispro (ADMELOG) corrective regimen sliding scale   SubCutaneous three times a day before meals  insulin lispro (ADMELOG) corrective regimen sliding scale   SubCutaneous at bedtime  insulin lispro Injectable (ADMELOG) 8 Unit(s) SubCutaneous three times a day before meals  petrolatum white Ointment 1 Application(s) Topical daily  potassium chloride    Tablet ER 40 milliEquivalent(s) Oral once  traZODone 150 milliGRAM(s) Oral at bedtime    MEDICATIONS  (PRN):  acetaminophen     Tablet .. 650 milliGRAM(s) Oral every 6 hours PRN Temp greater or equal to 38C (100.4F), Mild Pain (1 - 3)  albuterol    90 MICROgram(s) HFA Inhaler 2 Puff(s) Inhalation every 6 hours PRN Shortness of Breath and/or Wheezing  aluminum hydroxide/magnesium hydroxide/simethicone Suspension 30 milliLiter(s) Oral every 4 hours PRN Dyspepsia  benzocaine 15 mG/menthol 3.6 mG Lozenge 1 Lozenge Oral every 4 hours PRN Sore Throat  benzonatate 100 milliGRAM(s) Oral three times a day PRN Cough  dextrose Oral Gel 15 Gram(s) Oral once PRN Blood Glucose LESS THAN 70 milliGRAM(s)/deciliter  melatonin 3 milliGRAM(s) Oral at bedtime PRN Insomnia  ondansetron Injectable 4 milliGRAM(s) IV Push every 8 hours PRN Nausea and/or Vomiting           *****REVIEW OF SYSTEM:  GEN: no fever, no chills, no pain  RESP: no SOB, no cough, no sputum  CVS: no chest pain, no palpitations, no edema  GI: no abdominal pain, no nausea, no vomiting, no constipation, no diarrhea  : no dysurea, no frequency, no hematurea  Neuro: no headache, no dizziness  PSYCH: no anxiety, no depression  Derm : no itching, no rash         *****VITAL SIGNS:  T(C): 37 (11-20-22 @ 12:45), Max: 37 (11-20-22 @ 12:45)  HR: 93 (11-20-22 @ 12:45) (68 - 93)  BP: 125/61 (11-20-22 @ 12:45) (114/56 - 136/57)  RR: 18 (11-20-22 @ 12:45) (18 - 18)  SpO2: 98% (11-20-22 @ 12:45) (97% - 100%)  Wt(kg): --    11-19 @ 07:01  -  11-20 @ 07:00  --------------------------------------------------------  IN: 0 mL / OUT: 900 mL / NET: -900 mL             *****PHYSICAL EXAM:   Alert oriented x 3   Attention comprehension are fair. Able to name, repeat, read without any difficulty.   Able to follow 3 step commands.     EOMI fundi not visualized,  VFF to confrontration  No facial asymmetry   Tongue is midline   Palate elevates symmetrically   Moving all 4 ext symmetrically no pronator drift   Reflexes are symmetric throughout   sensation is grossly symmetric  Gait : not assessed.  B/L down going toes               *****LAB AND IMAGING:                          10.8   13.72 )-----------( 440      ( 20 Nov 2022 05:40 )             33.4               11-20    138  |  104  |  41<H>  ----------------------------<  177<H>  4.7   |  22  |  1.26    Ca    9.5      20 Nov 2022 05:40  Mg     1.40     11-20    TPro  7.1  /  Alb  3.3  /  TBili  0.3  /  DBili  <0.2  /  AST  23  /  ALT  31  /  AlkPhos  83  11-20                                [All pertinent recent Imaging reports reviewed]         *****A S S E S S M E N T   A N D   P L A N :  Excerpt from H&P,"   77 year old female, PMH of HTN, HLD, NIDDM, former smoker, quit 15 years ago, vaccinated for covid and boosted in the summer, presents to ER with symptoms of shortness of breath, cough, congestion, nausea, no vomiting, loose stool. Per pt, she was in usual state of health up until  2 days ago. Last night, patient was taken to  and tested positive for Covid. She was told EKG was abnormal so she came to ED for evaluation. Pt has no current reports of chest pain but endorses weakness and feels like she may pass out, no syncopal episodes. In ER, EKG with no signs of ACS. Cardiac enzymes and troponin are positive. Troponin 169, delta 173. CPK mildly elevated to 329, CKMB 4.6 index is 1.4.  Patient has multiple sick contacts at home,  is flu positive.  Cardiology consulted for possible NSTEMI in setting of covid. Start Remdesivir and Decadron. ID consulted.             Problem/Recommendations 1:  dizziness   likely  multifactorial  orthostatic due to dm autonomic dysfunction likely exacerbated by acute infectious process   plan  continue meclizine 12.5 bid   mri/mra can be done nonemergently   nih 0   ct head   carotids   Problem/Recommendations 2:    covid   currrently on remdesivir and steroids       pt at risk for developing delirium, therefore please institute the following preventative measures if possible          - initiating early mobilization          -minimizing "tethers" - IV, oxygen, catheters, etc          -avoiding   sedatives          -maintaining hydration/nutrition          -avoid anticholinergics - diphenhydramine, etc          -pain control          -sleep wake cycle regulation; avoid day time somnolence           -supportive environment    ___________________________  Will follow with you.  Thank you,  Belle Astudillo MD  Diplomate of the American Board of Neurology and Psychiatry.  Diplomate of the American Board of Vascular Neurology.   [facility] ([facility]), [facility]   Ph: [phone]    Differential diagnosis and plan of care discussed with patient after the evaluation.   Advanced care planning options discussed.   Pain assessed and judicious use of narcotics when appropriate was discussed.  Importance of Fall prevention discussed.  Counseling on Smoking and Alcohol cessation was offered when appropriate.  Counseling on Diet, exercise, and medication compliance was done.   83 minutes spent on the total encounter;  more than 50 % of the visit was spent on counseling  and or coordinating care by the attending physician.    Thank you for allowing me to participate in the care of this lola patient. Please do not hesitate to call me if you have any questions.     This and subsequent notes  will  inherently be subject to errors including those of syntax and substitutions which may escape proofreading. In such instances original meaning may be extrapolated by contextual derivation.

## 2022-11-19 NOTE — PROGRESS NOTE ADULT - ASSESSMENT
77 year old female, PMH of covid 19 4/2020, HTN, HLD, NIDDM, former smoker, quit 15 years ago, vaccinated for covid and boosted in the summer, presents to ER with symptoms of dizziness and weak almost fell down  covid 19    COVID infection  cxr without focal consolidation   initially required O2  s/p remdesivir x 5 day course, completed 11/18  trend liver enzymes while on remdesivir  c/w decadron x 10 days or until discharge  ac ppx  trend biomarkers  monitor SpO2  supportive care  isolation per infection control policy   discharge planning    fever- resolved  likely 2/2 covid  f/u blood cultures- NGTD  denies dysuria  not indication for antibiotics at this time    leukocytosis   likely 2/2 steroids    Simon Rodriguez M.D.  [facility], Division of Infectious Diseases  [phone]  After 5pm on weekdays and all day on weekends - please call [phone]

## 2022-11-19 NOTE — PROGRESS NOTE ADULT - ASSESSMENT
77 year old female, PMH of HTN, HLD, NIDDM, former smoker, quit 15 years ago, vaccinated for covid and boosted in the summer, presents to ER with symptoms of shortness of breath, cough, congestion, nausea, no vomiting, loose stool. Per pt, she was in usual state of health up until  2 days ago. Last night, patient was taken to  and tested positive for Covid. She was told EKG was abnormal so she came to ED for evaluation. Pt has no current reports of chest pain but endorses weakness and feels like she may pass out, no syncopal episodes. In ER, EKG with no signs of ACS. Cardiac enzymes and troponin are positive. Troponin 169, delta 173. CPK mildly elevated to 329, CKMB 4.6 index is 1.4.  Patient has multiple sick contacts at home,  is flu positive.  Cardiology consulted for possible NSTEMI in setting of covid. Start Remdesivir and Decadron. ID consulted.     Acute respiratory failure with hypoxia due to Covid19  - vaccinated and boosted  - given risk factors, started IV Remdesivir (completed 11/18/22) and decadron (last day 11/24/22)  - risk benefits discussed  - monitor inflammatory markers  - CXR no consolidation  - keep O2 sats above 92%  - Cepacol and antitussives PRN     Chest pain, elevated  trop  - likely demand ischemia per cardiology  - Trop stable x 3  - c/w Asa, hep gtt (s/p Brilinta loaded x 1)  (takes Pravastatin at home). Intolerance to other statins? listed as allergy  - TTE w/o any segmental LV wall motion abnormalities  - house card appreciated    BPV  - trial of meclizine on 11/18 without much improvement  - consulted neurology    Diabetes mellitus type 2 w/unspecified complications  - hold home metformin and Glimiperide  - hgbA1c = 7.7  - Insulin Lantus 5 units  - sliding scale. cont with basal/bolus insulin as ordered    Hypertension  - BP stable  - hold lisinopril given mildly elevated Cr    Elevated Cr  - unclear baseline  - will review outpt EMR  - hold ACEI for now.   - resolved    DVT ppx

## 2022-11-20 LAB
ALBUMIN SERPL ELPH-MCNC: 3.3 G/DL — SIGNIFICANT CHANGE UP (ref 3.3–5)
ALBUMIN SERPL ELPH-MCNC: 3.4 G/DL — SIGNIFICANT CHANGE UP (ref 3.3–5)
ALP SERPL-CCNC: 83 U/L — SIGNIFICANT CHANGE UP (ref 40–120)
ALP SERPL-CCNC: 86 U/L — SIGNIFICANT CHANGE UP (ref 40–120)
ALT FLD-CCNC: 31 U/L — SIGNIFICANT CHANGE UP (ref 4–33)
ALT FLD-CCNC: 32 U/L — SIGNIFICANT CHANGE UP (ref 4–33)
ANION GAP SERPL CALC-SCNC: 12 MMOL/L — SIGNIFICANT CHANGE UP (ref 7–14)
AST SERPL-CCNC: 21 U/L — SIGNIFICANT CHANGE UP (ref 4–32)
AST SERPL-CCNC: 23 U/L — SIGNIFICANT CHANGE UP (ref 4–32)
BILIRUB DIRECT SERPL-MCNC: <0.2 MG/DL — SIGNIFICANT CHANGE UP (ref 0–0.3)
BILIRUB INDIRECT FLD-MCNC: >0.1 MG/DL — SIGNIFICANT CHANGE UP (ref 0–1)
BILIRUB SERPL-MCNC: 0.3 MG/DL — SIGNIFICANT CHANGE UP (ref 0.2–1.2)
BILIRUB SERPL-MCNC: 0.3 MG/DL — SIGNIFICANT CHANGE UP (ref 0.2–1.2)
BUN SERPL-MCNC: 41 MG/DL — HIGH (ref 7–23)
CALCIUM SERPL-MCNC: 9.5 MG/DL — SIGNIFICANT CHANGE UP (ref 8.4–10.5)
CHLORIDE SERPL-SCNC: 104 MMOL/L — SIGNIFICANT CHANGE UP (ref 98–107)
CO2 SERPL-SCNC: 22 MMOL/L — SIGNIFICANT CHANGE UP (ref 22–31)
CREAT SERPL-MCNC: 1.25 MG/DL — SIGNIFICANT CHANGE UP (ref 0.5–1.3)
CREAT SERPL-MCNC: 1.26 MG/DL — SIGNIFICANT CHANGE UP (ref 0.5–1.3)
D DIMER BLD IA.RAPID-MCNC: 352 NG/ML DDU — HIGH
EGFR: 45 ML/MIN/1.73M2 — LOW
EGFR: 45 ML/MIN/1.73M2 — LOW
GLUCOSE SERPL-MCNC: 177 MG/DL — HIGH (ref 70–99)
HCT VFR BLD CALC: 33.4 % — LOW (ref 34.5–45)
HGB BLD-MCNC: 10.8 G/DL — LOW (ref 11.5–15.5)
MAGNESIUM SERPL-MCNC: 1.4 MG/DL — LOW (ref 1.6–2.6)
MCHC RBC-ENTMCNC: 25.3 PG — LOW (ref 27–34)
MCHC RBC-ENTMCNC: 32.3 GM/DL — SIGNIFICANT CHANGE UP (ref 32–36)
MCV RBC AUTO: 78.2 FL — LOW (ref 80–100)
NRBC # BLD: 0 /100 WBCS — SIGNIFICANT CHANGE UP (ref 0–0)
NRBC # FLD: 0 K/UL — SIGNIFICANT CHANGE UP (ref 0–0)
PLATELET # BLD AUTO: 440 K/UL — HIGH (ref 150–400)
POTASSIUM SERPL-MCNC: 4.7 MMOL/L — SIGNIFICANT CHANGE UP (ref 3.5–5.3)
POTASSIUM SERPL-SCNC: 4.7 MMOL/L — SIGNIFICANT CHANGE UP (ref 3.5–5.3)
PROT SERPL-MCNC: 6.9 G/DL — SIGNIFICANT CHANGE UP (ref 6–8.3)
PROT SERPL-MCNC: 7.1 G/DL — SIGNIFICANT CHANGE UP (ref 6–8.3)
RBC # BLD: 4.27 M/UL — SIGNIFICANT CHANGE UP (ref 3.8–5.2)
RBC # FLD: 16.3 % — HIGH (ref 10.3–14.5)
SODIUM SERPL-SCNC: 138 MMOL/L — SIGNIFICANT CHANGE UP (ref 135–145)
WBC # BLD: 13.72 K/UL — HIGH (ref 3.8–10.5)
WBC # FLD AUTO: 13.72 K/UL — HIGH (ref 3.8–10.5)

## 2022-11-20 RX ORDER — INSULIN LISPRO 100/ML
8 VIAL (ML) SUBCUTANEOUS
Refills: 0 | Status: DISCONTINUED | OUTPATIENT
Start: 2022-11-20 | End: 2022-11-23

## 2022-11-20 RX ORDER — MAGNESIUM SULFATE 500 MG/ML
1 VIAL (ML) INJECTION ONCE
Refills: 0 | Status: COMPLETED | OUTPATIENT
Start: 2022-11-20 | End: 2022-11-20

## 2022-11-20 RX ORDER — INSULIN GLARGINE 100 [IU]/ML
12 INJECTION, SOLUTION SUBCUTANEOUS AT BEDTIME
Refills: 0 | Status: DISCONTINUED | OUTPATIENT
Start: 2022-11-20 | End: 2022-11-23

## 2022-11-20 RX ADMIN — Medication 1200 MILLIGRAM(S): at 05:52

## 2022-11-20 RX ADMIN — Medication 1 APPLICATION(S): at 13:14

## 2022-11-20 RX ADMIN — Medication 8 UNIT(S): at 13:15

## 2022-11-20 RX ADMIN — HEPARIN SODIUM 5000 UNIT(S): 5000 INJECTION INTRAVENOUS; SUBCUTANEOUS at 13:13

## 2022-11-20 RX ADMIN — Medication 3: at 09:12

## 2022-11-20 RX ADMIN — Medication 8 UNIT(S): at 18:38

## 2022-11-20 RX ADMIN — HEPARIN SODIUM 5000 UNIT(S): 5000 INJECTION INTRAVENOUS; SUBCUTANEOUS at 05:34

## 2022-11-20 RX ADMIN — Medication 6 MILLIGRAM(S): at 05:52

## 2022-11-20 RX ADMIN — INSULIN GLARGINE 12 UNIT(S): 100 INJECTION, SOLUTION SUBCUTANEOUS at 22:24

## 2022-11-20 RX ADMIN — Medication 150 MILLIGRAM(S): at 21:53

## 2022-11-20 RX ADMIN — Medication 1200 MILLIGRAM(S): at 18:18

## 2022-11-20 RX ADMIN — Medication 81 MILLIGRAM(S): at 13:13

## 2022-11-20 RX ADMIN — Medication 2: at 22:23

## 2022-11-20 RX ADMIN — Medication 100 GRAM(S): at 09:13

## 2022-11-20 RX ADMIN — Medication 5 UNIT(S): at 09:11

## 2022-11-20 RX ADMIN — Medication 6: at 13:12

## 2022-11-20 RX ADMIN — HEPARIN SODIUM 5000 UNIT(S): 5000 INJECTION INTRAVENOUS; SUBCUTANEOUS at 21:52

## 2022-11-20 RX ADMIN — Medication 5: at 18:39

## 2022-11-20 RX ADMIN — Medication 1000 UNIT(S): at 13:13

## 2022-11-20 NOTE — PROGRESS NOTE ADULT - ASSESSMENT
77 year old female, PMH of HTN, HLD, NIDDM, former smoker, quit 15 years ago, vaccinated for covid and boosted in the summer, presents to ER with symptoms of shortness of breath, cough, congestion, nausea, no vomiting, loose stool. Per pt, she was in usual state of health up until  2 days ago. Last night, patient was taken to  and tested positive for Covid. She was told EKG was abnormal so she came to ED for evaluation. Pt has no current reports of chest pain but endorses weakness and feels like she may pass out, no syncopal episodes. In ER, EKG with no signs of ACS. Cardiac enzymes and troponin are positive. Troponin 169, delta 173. CPK mildly elevated to 329, CKMB 4.6 index is 1.4.  Patient has multiple sick contacts at home,  is flu positive.  Cardiology consulted for possible NSTEMI in setting of covid. Start Remdesivir and Decadron. ID consulted.     Acute respiratory failure with hypoxia due to Covid19  Chest pain, elevated  trop  Diabetes  Hypertension  Acute kidney failure  DVT ppx      11/14/2022:    Acute respiratory failure with hypoxia due to Covid19: she is using 2 L of oxygen : agree with remdesivir as well as dexa: mantain o2 sao2 above 935 all the time:  check d dimer: ordered:  Follow LFTs and renal profile while on remdesivir:   Chest pain, elevated  trop : ? demand ischemia:  would defer to cards / PMD   Diabetes : monitor and control on dexa:   Hypertension ; controlled  Acute kidney failure : creat is downtrending:  cont to follow   DVT ppx    [facility]     11/15:    Acute respiratory failure with hypoxia due to Covid19: she is using 2 L of oxygen  but currently off oxygen : monitor o2 saio2:  keep above 92% all the time:  : agree with remdesivir as well as dexa:  check d dimer: ordered:  Follow LFTs and renal profile while on remdesivir:   Chest pain, elevated  trop : ? demand ischemia:  would defer to cards / PMD   Diabetes : monitor and control on dexa:   Hypertension ; controlled  Acute kidney failure : creat is downtrending:  cont to follow   DVT ppx  ? start lovenox: [facility]     11/16:    Acute respiratory failure with hypoxia due to Covid19: she is using 2 L of oxygen  but currently off oxygen : monitor o2 saio2:  keep above 92% all the time:  : agree with remdesivir as well as dexa:  trend d dimer:   Follow LFTs and renal profile while on remdesivir:   Chest pain, elevated  trop : ? demand ischemia:  would defer to cards / PMD : resolved  Diabetes : monitor and control on dexa:   Hypertension ; controlled  Acute kidney failure : creat is downtrending:  cont to follow   DVT ppx  : on heparin sq tid  [facility]     11/17:    Acute respiratory failure with hypoxia due to Covid19: off oxygen  : monitor o2 saio2:  keep above 92% all the time:  : finished   remdesivir andon  dexa:  trend d dimer: slightly high :  Chest pain, elevated  trop : ? demand ischemia:  would defer to cards / PMD : resolved  Diabetes : monitor and control on dexa:   Hypertension ; controlled  Acute kidney failure : creat is downtrending:  cont to follow   DVT ppx  : on heparin sq tid  [facility]    11/18:    Acute respiratory failure with hypoxia due to Covid19: off oxygen  : monitor o2 sa o2 above 92% all the time:  : On  remdesivir as well as dexa:  trend d dimer: slightly decreased today   Chest pain, elevated  trop : ? demand ischemia:  would defer to cards / PMD : resolved  Diabetes : monitor and control on dexa:   Hypertension ; controlled  Acute kidney failure : creat is downtrending:  cont to follow   DVT ppx  : on heparin sq tid  [facility]    11/19:    Acute respiratory failure with hypoxia due to Covid19: off oxygen  : monitor o2 sa o2 above 92% all the time:  : finished   remdesivir and on  dexa:  trend d dimer: slightly decreased :  VERTIGO: defer to primary team : neuro to see  Chest pain, elevated  trop : ? demand ischemia:  would defer to cards / PMD : resolved  Diabetes : monitor and control on dexa:   Hypertension ; controlled  Acute kidney failure : creat is downtrending:  cont to follow   DVT ppx  : on heparin sq tid  [facility]    11/20:    Acute respiratory failure with hypoxia due to Covid19: off oxygen  : monitor o2 sa o2 above 92% all the time:  : finished   remdesivir and on  dexa:  trend d dimer: slightly decreased : she deos feel SOB on exertion:  do d dimer today  : if increased do cta   VERTIGO: defer to primary team : neuro to see  Chest pain, elevated  trop : ? demand ischemia:  would defer to cards / PMD : resolved  Diabetes : monitor and control on dexa:   Hypertension ; controlled  Acute kidney failure : creat is downtrending:  cont to follow   DVT ppx  : on heparin sq tid  dw acp

## 2022-11-20 NOTE — CHART NOTE - NSCHARTNOTEFT_GEN_A_CORE
D-dimer noted to be elevated today 352. Discussed with attending Dr. Quesada. Recommends to order CTA to r/o PE. Order placed.

## 2022-11-20 NOTE — PROGRESS NOTE ADULT - ASSESSMENT
77 year old female, PMH of covid 19 4/2020, HTN, HLD, NIDDM, former smoker, quit 15 years ago, vaccinated for covid and boosted in the summer, presents to ER with symptoms of dizziness and weak almost fell down  covid 19    COVID infection  cxr without focal consolidation   initially required O2  s/p remdesivir x 5 day course, completed 11/18  c/w decadron x 10 days or until discharge  ac ppx  trend biomarkers  monitor SpO2  supportive care  isolation per infection control policy   discharge planning    fever- resolved  likely 2/2 covid  f/u blood cultures- NGTD  denies dysuria    leukocytosis   likely 2/2 steroids    vertigo  management per primary team    Simon Rodriguez M.D.  [facility], Division of Infectious Diseases  [phone]  After 5pm on weekdays and all day on weekends - please call [phone]

## 2022-11-21 LAB
ALBUMIN SERPL ELPH-MCNC: 3.4 G/DL — SIGNIFICANT CHANGE UP (ref 3.3–5)
ALP SERPL-CCNC: 84 U/L — SIGNIFICANT CHANGE UP (ref 40–120)
ALT FLD-CCNC: 28 U/L — SIGNIFICANT CHANGE UP (ref 4–33)
ANION GAP SERPL CALC-SCNC: 11 MMOL/L — SIGNIFICANT CHANGE UP (ref 7–14)
AST SERPL-CCNC: 16 U/L — SIGNIFICANT CHANGE UP (ref 4–32)
BILIRUB SERPL-MCNC: 0.2 MG/DL — SIGNIFICANT CHANGE UP (ref 0.2–1.2)
BUN SERPL-MCNC: 45 MG/DL — HIGH (ref 7–23)
CALCIUM SERPL-MCNC: 9.6 MG/DL — SIGNIFICANT CHANGE UP (ref 8.4–10.5)
CHLORIDE SERPL-SCNC: 103 MMOL/L — SIGNIFICANT CHANGE UP (ref 98–107)
CO2 SERPL-SCNC: 21 MMOL/L — LOW (ref 22–31)
CREAT SERPL-MCNC: 1.42 MG/DL — HIGH (ref 0.5–1.3)
CULTURE RESULTS: SIGNIFICANT CHANGE UP
CULTURE RESULTS: SIGNIFICANT CHANGE UP
EGFR: 39 ML/MIN/1.73M2 — LOW
GLUCOSE SERPL-MCNC: 224 MG/DL — HIGH (ref 70–99)
HCT VFR BLD CALC: 32.6 % — LOW (ref 34.5–45)
HGB BLD-MCNC: 10.3 G/DL — LOW (ref 11.5–15.5)
MAGNESIUM SERPL-MCNC: 1.5 MG/DL — LOW (ref 1.6–2.6)
MCHC RBC-ENTMCNC: 24.9 PG — LOW (ref 27–34)
MCHC RBC-ENTMCNC: 31.6 GM/DL — LOW (ref 32–36)
MCV RBC AUTO: 78.7 FL — LOW (ref 80–100)
NRBC # BLD: 0 /100 WBCS — SIGNIFICANT CHANGE UP (ref 0–0)
NRBC # FLD: 0 K/UL — SIGNIFICANT CHANGE UP (ref 0–0)
PLATELET # BLD AUTO: 445 K/UL — HIGH (ref 150–400)
POTASSIUM SERPL-MCNC: 5 MMOL/L — SIGNIFICANT CHANGE UP (ref 3.5–5.3)
POTASSIUM SERPL-SCNC: 5 MMOL/L — SIGNIFICANT CHANGE UP (ref 3.5–5.3)
PROT SERPL-MCNC: 6.8 G/DL — SIGNIFICANT CHANGE UP (ref 6–8.3)
RBC # BLD: 4.14 M/UL — SIGNIFICANT CHANGE UP (ref 3.8–5.2)
RBC # FLD: 16.1 % — HIGH (ref 10.3–14.5)
SARS-COV-2 RNA SPEC QL NAA+PROBE: DETECTED
SODIUM SERPL-SCNC: 135 MMOL/L — SIGNIFICANT CHANGE UP (ref 135–145)
SPECIMEN SOURCE: SIGNIFICANT CHANGE UP
SPECIMEN SOURCE: SIGNIFICANT CHANGE UP
WBC # BLD: 15.59 K/UL — HIGH (ref 3.8–10.5)
WBC # FLD AUTO: 15.59 K/UL — HIGH (ref 3.8–10.5)

## 2022-11-21 RX ORDER — MAGNESIUM SULFATE 500 MG/ML
2 VIAL (ML) INJECTION ONCE
Refills: 0 | Status: COMPLETED | OUTPATIENT
Start: 2022-11-21 | End: 2022-11-21

## 2022-11-21 RX ORDER — SODIUM CHLORIDE 9 MG/ML
1000 INJECTION, SOLUTION INTRAVENOUS
Refills: 0 | Status: DISCONTINUED | OUTPATIENT
Start: 2022-11-21 | End: 2022-11-22

## 2022-11-21 RX ADMIN — Medication 1200 MILLIGRAM(S): at 05:58

## 2022-11-21 RX ADMIN — Medication 1000 UNIT(S): at 13:13

## 2022-11-21 RX ADMIN — Medication 150 MILLIGRAM(S): at 23:39

## 2022-11-21 RX ADMIN — Medication 8 UNIT(S): at 09:09

## 2022-11-21 RX ADMIN — Medication 25 GRAM(S): at 13:53

## 2022-11-21 RX ADMIN — SODIUM CHLORIDE 75 MILLILITER(S): 9 INJECTION, SOLUTION INTRAVENOUS at 13:12

## 2022-11-21 RX ADMIN — INSULIN GLARGINE 12 UNIT(S): 100 INJECTION, SOLUTION SUBCUTANEOUS at 23:35

## 2022-11-21 RX ADMIN — Medication 2: at 23:36

## 2022-11-21 RX ADMIN — HEPARIN SODIUM 5000 UNIT(S): 5000 INJECTION INTRAVENOUS; SUBCUTANEOUS at 13:14

## 2022-11-21 RX ADMIN — HEPARIN SODIUM 5000 UNIT(S): 5000 INJECTION INTRAVENOUS; SUBCUTANEOUS at 05:59

## 2022-11-21 RX ADMIN — HEPARIN SODIUM 5000 UNIT(S): 5000 INJECTION INTRAVENOUS; SUBCUTANEOUS at 23:38

## 2022-11-21 RX ADMIN — Medication 6: at 13:13

## 2022-11-21 RX ADMIN — Medication 3: at 09:09

## 2022-11-21 RX ADMIN — Medication 6 MILLIGRAM(S): at 05:59

## 2022-11-21 RX ADMIN — Medication 81 MILLIGRAM(S): at 13:14

## 2022-11-21 RX ADMIN — Medication 4: at 18:32

## 2022-11-21 RX ADMIN — Medication 8 UNIT(S): at 18:32

## 2022-11-21 RX ADMIN — Medication 1200 MILLIGRAM(S): at 17:39

## 2022-11-21 RX ADMIN — Medication 1 APPLICATION(S): at 13:15

## 2022-11-21 RX ADMIN — Medication 8 UNIT(S): at 13:12

## 2022-11-21 NOTE — DIETITIAN INITIAL EVALUATION ADULT - PERTINENT MEDS FT
MEDICATIONS  (STANDING):  aspirin enteric coated 81 milliGRAM(s) Oral daily  cholecalciferol 1000 Unit(s) Oral daily  dexAMETHasone  Injectable 6 milliGRAM(s) IV Push daily  dextrose 5%. 1000 milliLiter(s) (100 mL/Hr) IV Continuous <Continuous>  dextrose 5%. 1000 milliLiter(s) (50 mL/Hr) IV Continuous <Continuous>  dextrose 50% Injectable 25 Gram(s) IV Push once  dextrose 50% Injectable 12.5 Gram(s) IV Push once  dextrose 50% Injectable 25 Gram(s) IV Push once  glucagon  Injectable 1 milliGRAM(s) IntraMuscular once  guaiFENesin ER 1200 milliGRAM(s) Oral every 12 hours  heparin   Injectable 5000 Unit(s) SubCutaneous every 8 hours  influenza  Vaccine (HIGH DOSE) 0.7 milliLiter(s) IntraMuscular once  insulin glargine Injectable (LANTUS) 12 Unit(s) SubCutaneous at bedtime  insulin lispro (ADMELOG) corrective regimen sliding scale   SubCutaneous at bedtime  insulin lispro (ADMELOG) corrective regimen sliding scale   SubCutaneous three times a day before meals  insulin lispro Injectable (ADMELOG) 8 Unit(s) SubCutaneous three times a day before meals  lactated ringers. 1000 milliLiter(s) (75 mL/Hr) IV Continuous <Continuous>  petrolatum white Ointment 1 Application(s) Topical daily  potassium chloride    Tablet ER 40 milliEquivalent(s) Oral once  traZODone 150 milliGRAM(s) Oral at bedtime    MEDICATIONS  (PRN):  acetaminophen     Tablet .. 650 milliGRAM(s) Oral every 6 hours PRN Temp greater or equal to 38C (100.4F), Mild Pain (1 - 3)  albuterol    90 MICROgram(s) HFA Inhaler 2 Puff(s) Inhalation every 6 hours PRN Shortness of Breath and/or Wheezing  aluminum hydroxide/magnesium hydroxide/simethicone Suspension 30 milliLiter(s) Oral every 4 hours PRN Dyspepsia  benzocaine 15 mG/menthol 3.6 mG Lozenge 1 Lozenge Oral every 4 hours PRN Sore Throat  benzonatate 100 milliGRAM(s) Oral three times a day PRN Cough  dextrose Oral Gel 15 Gram(s) Oral once PRN Blood Glucose LESS THAN 70 milliGRAM(s)/deciliter  melatonin 3 milliGRAM(s) Oral at bedtime PRN Insomnia  ondansetron Injectable 4 milliGRAM(s) IV Push every 8 hours PRN Nausea and/or Vomiting

## 2022-11-21 NOTE — DIETITIAN INITIAL EVALUATION ADULT - PERTINENT LABORATORY DATA
11-21    135  |  103  |  45<H>  ----------------------------<  224<H>  5.0   |  21<L>  |  1.42<H>    Ca    9.6      21 Nov 2022 05:30  Mg     1.50     11-21    TPro  6.8  /  Alb  3.4  /  TBili  0.2  /  DBili  <0.2  /  AST  16  /  ALT  28  /  AlkPhos  84  11-21  POCT Blood Glucose.: 394 mg/dL (11-21-22 @ 13:14)  A1C with Estimated Average Glucose Result: 7.7 % (11-15-22 @ 06:48)

## 2022-11-21 NOTE — DIETITIAN INITIAL EVALUATION ADULT - OTHER INFO
77 year old female, PMH of HTN, HLD, NIDDM, former smoker, quit 15 years ago, vaccinated for covid and boosted in the summer, presents to ER with symptoms of shortness of breath, cough, congestion, nausea, no vomiting, loose stool. Per pt, she was in usual state of health up until  2 days ago. Last night, patient was taken to UC and tested positive for Covid. She was told EKG was abnormal so she came to ED for evaluation.    Pt seen and reports 75% intake of meals with No GI distress (nausea/vomiting/diarrhea/constipation.) at present. But c/o not receiving food preferences marked on her menu. Kitchen made aware of her food preferences to provide her likes and avoid dislikes. Noted skin with Incontinence associated dermatitis, +1 edema Rt & Lt ankle, foot and leg per nursing flow sheet.

## 2022-11-21 NOTE — PROGRESS NOTE ADULT - ASSESSMENT
77 year old female, PMH of HTN, HLD, NIDDM, former smoker, quit 15 years ago, vaccinated for covid and boosted in the summer, presents to ER with symptoms of shortness of breath, cough, congestion, nausea, no vomiting, loose stool. Per pt, she was in usual state of health up until  2 days ago. Last night, patient was taken to  and tested positive for Covid. She was told EKG was abnormal so she came to ED for evaluation. Pt has no current reports of chest pain but endorses weakness and feels like she may pass out, no syncopal episodes. In ER, EKG with no signs of ACS. Cardiac enzymes and troponin are positive. Troponin 169, delta 173. CPK mildly elevated to 329, CKMB 4.6 index is 1.4.  Patient has multiple sick contacts at home,  is flu positive.  Cardiology consulted for possible NSTEMI in setting of covid. Start Remdesivir and Decadron. ID consulted.     Acute respiratory failure with hypoxia due to Covid19  Chest pain, elevated  trop  Diabetes  Hypertension  Acute kidney failure  DVT ppx      11/14/2022:    Acute respiratory failure with hypoxia due to Covid19: she is using 2 L of oxygen : agree with remdesivir as well as dexa: mantain o2 sao2 above 935 all the time:  check d dimer: ordered:  Follow LFTs and renal profile while on remdesivir:   Chest pain, elevated  trop : ? demand ischemia:  would defer to cards / PMD   Diabetes : monitor and control on dexa:   Hypertension ; controlled  Acute kidney failure : creat is downtrending:  cont to follow   DVT ppx    [facility]     11/15:    Acute respiratory failure with hypoxia due to Covid19: she is using 2 L of oxygen  but currently off oxygen : monitor o2 saio2:  keep above 92% all the time:  : agree with remdesivir as well as dexa:  check d dimer: ordered:  Follow LFTs and renal profile while on remdesivir:   Chest pain, elevated  trop : ? demand ischemia:  would defer to cards / PMD   Diabetes : monitor and control on dexa:   Hypertension ; controlled  Acute kidney failure : creat is downtrending:  cont to follow   DVT ppx  ? start lovenox: [facility]     11/16:    Acute respiratory failure with hypoxia due to Covid19: she is using 2 L of oxygen  but currently off oxygen : monitor o2 saio2:  keep above 92% all the time:  : agree with remdesivir as well as dexa:  trend d dimer:   Follow LFTs and renal profile while on remdesivir:   Chest pain, elevated  trop : ? demand ischemia:  would defer to cards / PMD : resolved  Diabetes : monitor and control on dexa:   Hypertension ; controlled  Acute kidney failure : creat is downtrending:  cont to follow   DVT ppx  : on heparin sq tid  [facility]     11/17:    Acute respiratory failure with hypoxia due to Covid19: off oxygen  : monitor o2 saio2:  keep above 92% all the time:  : finished   remdesivir andon  dexa:  trend d dimer: slightly high :  Chest pain, elevated  trop : ? demand ischemia:  would defer to cards / PMD : resolved  Diabetes : monitor and control on dexa:   Hypertension ; controlled  Acute kidney failure : creat is downtrending:  cont to follow   DVT ppx  : on heparin sq tid  [facility]    11/18:    Acute respiratory failure with hypoxia due to Covid19: off oxygen  : monitor o2 sa o2 above 92% all the time:  : On  remdesivir as well as dexa:  trend d dimer: slightly decreased today   Chest pain, elevated  trop : ? demand ischemia:  would defer to cards / PMD : resolved  Diabetes : monitor and control on dexa:   Hypertension ; controlled  Acute kidney failure : creat is downtrending:  cont to follow   DVT ppx  : on heparin sq tid  [facility]    11/19:    Acute respiratory failure with hypoxia due to Covid19: off oxygen  : monitor o2 sa o2 above 92% all the time:  : finished   remdesivir and on  dexa:  trend d dimer: slightly decreased :  VERTIGO: defer to primary team : neuro to see  Chest pain, elevated  trop : ? demand ischemia:  would defer to cards / PMD : resolved  Diabetes : monitor and control on dexa:   Hypertension ; controlled  Acute kidney failure : creat is downtrending:  cont to follow   DVT ppx  : on heparin sq tid  [facility]    11/20:    Acute respiratory failure with hypoxia due to Covid19: off oxygen  : monitor o2 sa o2 above 92% all the time:  : finished   remdesivir and on  dexa:  trend d dimer: slightly decreased : she deos feel SOB on exertion:  do d dimer today  : if increased do cta   VERTIGO: defer to primary team : neuro to see  Chest pain, elevated  trop : ? demand ischemia:  would defer to cards / PMD : resolved  Diabetes : monitor and control on dexa:   Hypertension ; controlled  Acute kidney failure : creat is downtrending:  cont to follow   DVT ppx  : on heparin sq tid  dw acp    11/21:       Acute respiratory failure with hypoxia due to Covid19: off oxygen  : monitor o2 sa o2 above 92% all the time:  : finished   remdesivir and on  dexa:  trend d dimer: slightly decreased : she does feel SOB on exertion:  do d dimer cheked yesteday and it had decreased   :    hence cta was not done  , in addition her renal functions got worse and the clinical probability of pe is pretty low;   VERTIGO: defer to primary team : neuro to see  Chest pain, elevated  trop : ? demand ischemia:  would defer to cards / PMD : resolved  Diabetes : monitor and control on dexa:   Hypertension ; controlled  Acute kidney failure : creat is downtrending:  cont to follow   DVT ppx  : on heparin sq tid  dw PMD

## 2022-11-21 NOTE — DIETITIAN INITIAL EVALUATION ADULT - NAME AND PHONE
Follow up for rehab spoke with spouse and daughter agreeable to IRF with [facility] goal to get Marcio walking again. Home is ramped spouse can provide 24/7 support. Bathroom is not wheelchair accessible.   Maria Del Rosario Longo RDN #[id number]

## 2022-11-21 NOTE — PROGRESS NOTE ADULT - ASSESSMENT
77 year old female, PMH of covid 19 4/2020, HTN, HLD, NIDDM, former smoker, quit 15 years ago, vaccinated for covid and boosted in the summer, presents to ER with symptoms of dizziness and weak almost fell down  covid 19    COVID infection  cxr without focal consolidation   initially required O2  s/p remdesivir x 5 day course, completed 11/18  c/w decadron x 10 days or until discharge  ac ppx  trend biomarkers  monitor SpO2  supportive care  isolation per infection control policy     SOB  pt w/o hypoxia  CTA pending    fever- resolved  likely 2/2 covid  f/u blood cultures- NGTD  denies dysuria    leukocytosis   likely 2/2 steroids    vertigo  reports developing vertigo around same time as resp symptoms  had vertigo last time she had covid as well  neurology consulted  CT head pending    Simon Rodriguez M.D.  [facility], Division of Infectious Diseases  [phone]  After 5pm on weekdays and all day on weekends - please call [phone]

## 2022-11-21 NOTE — DIETITIAN INITIAL EVALUATION ADULT - NS FNS DIET ORDER
Diet, Regular:   Consistent Carbohydrate {No Snacks} (CSTCHO)  DASH/TLC {Sodium & Cholesterol Restricted} (DASH) (11-17-22 @ 12:12)

## 2022-11-21 NOTE — PROGRESS NOTE ADULT - ASSESSMENT
77 year old female, PMH of HTN, HLD, NIDDM, former smoker, quit 15 years ago, vaccinated for covid and boosted in the summer, presents to ER with symptoms of shortness of breath, cough, congestion, nausea, no vomiting, loose stool. Per pt, she was in usual state of health up until  2 days ago. Last night, patient was taken to  and tested positive for Covid. She was told EKG was abnormal so she came to ED for evaluation. Pt has no current reports of chest pain but endorses weakness and feels like she may pass out, no syncopal episodes. In ER, EKG with no signs of ACS. Cardiac enzymes and troponin are positive. Troponin 169, delta 173. CPK mildly elevated to 329, CKMB 4.6 index is 1.4.  Patient has multiple sick contacts at home,  is flu positive.  Cardiology consulted for possible NSTEMI in setting of covid. Start Remdesivir and Decadron. ID consulted.     Acute respiratory failure with hypoxia due to Covid19  - vaccinated and boosted  - given risk factors, started IV Remdesivir (completed 11/18/22) and decadron (last day 11/24/22)  - risk benefits discussed  - D-dimer slowly downtrending, no need for CTA chest (d/wed with pulm)  - CXR no consolidation  - keep O2 sats above 92%  - Cepacol and antitussives PRN     Chest pain, elevated  trop  - likely demand ischemia per cardiology  - Trop stable x 3  - c/w Asa, hep gtt (s/p Brilinta loaded x 1)  (takes Pravastatin at home). Intolerance to other statins? listed as allergy  - TTE w/o any segmental LV wall motion abnormalities  - house card appreciated    BPV  - trial of meclizine on 11/18 without much improvement  - CTH pending  - appreciate neurology    Diabetes mellitus type 2 w/unspecified complications  - hold home metformin and Glimiperide  - hgbA1c = 7.7  - Insulin Lantus 5 units  - sliding scale. cont with basal/bolus insulin as ordered    Hypertension  - BP stable  - hold lisinopril given mildly elevated Cr    PALMA  - 2' deydration  - ordered gentle LR 75 cc/hr 11/21/22 x 3 days    DVT ppx    - SC heparin

## 2022-11-22 LAB
ALBUMIN SERPL ELPH-MCNC: 3.4 G/DL — SIGNIFICANT CHANGE UP (ref 3.3–5)
ALBUMIN SERPL ELPH-MCNC: 3.4 G/DL — SIGNIFICANT CHANGE UP (ref 3.3–5)
ALP SERPL-CCNC: 78 U/L — SIGNIFICANT CHANGE UP (ref 40–120)
ALP SERPL-CCNC: 81 U/L — SIGNIFICANT CHANGE UP (ref 40–120)
ALT FLD-CCNC: 23 U/L — SIGNIFICANT CHANGE UP (ref 4–33)
ALT FLD-CCNC: 24 U/L — SIGNIFICANT CHANGE UP (ref 4–33)
ANION GAP SERPL CALC-SCNC: 14 MMOL/L — SIGNIFICANT CHANGE UP (ref 7–14)
AST SERPL-CCNC: 15 U/L — SIGNIFICANT CHANGE UP (ref 4–32)
AST SERPL-CCNC: 15 U/L — SIGNIFICANT CHANGE UP (ref 4–32)
BILIRUB DIRECT SERPL-MCNC: <0.2 MG/DL — SIGNIFICANT CHANGE UP (ref 0–0.3)
BILIRUB INDIRECT FLD-MCNC: >0 MG/DL — SIGNIFICANT CHANGE UP (ref 0–1)
BILIRUB SERPL-MCNC: 0.2 MG/DL — SIGNIFICANT CHANGE UP (ref 0.2–1.2)
BILIRUB SERPL-MCNC: 0.2 MG/DL — SIGNIFICANT CHANGE UP (ref 0.2–1.2)
BUN SERPL-MCNC: 41 MG/DL — HIGH (ref 7–23)
CALCIUM SERPL-MCNC: 9.3 MG/DL — SIGNIFICANT CHANGE UP (ref 8.4–10.5)
CHLORIDE SERPL-SCNC: 101 MMOL/L — SIGNIFICANT CHANGE UP (ref 98–107)
CO2 SERPL-SCNC: 20 MMOL/L — LOW (ref 22–31)
CREAT SERPL-MCNC: 1.31 MG/DL — HIGH (ref 0.5–1.3)
CREAT SERPL-MCNC: 1.31 MG/DL — HIGH (ref 0.5–1.3)
EGFR: 42 ML/MIN/1.73M2 — LOW
EGFR: 42 ML/MIN/1.73M2 — LOW
GLUCOSE SERPL-MCNC: 237 MG/DL — HIGH (ref 70–99)
HCT VFR BLD CALC: 31 % — LOW (ref 34.5–45)
HGB BLD-MCNC: 9.9 G/DL — LOW (ref 11.5–15.5)
MAGNESIUM SERPL-MCNC: 1.8 MG/DL — SIGNIFICANT CHANGE UP (ref 1.6–2.6)
MCHC RBC-ENTMCNC: 25.1 PG — LOW (ref 27–34)
MCHC RBC-ENTMCNC: 31.9 GM/DL — LOW (ref 32–36)
MCV RBC AUTO: 78.7 FL — LOW (ref 80–100)
NRBC # BLD: 0 /100 WBCS — SIGNIFICANT CHANGE UP (ref 0–0)
NRBC # FLD: 0 K/UL — SIGNIFICANT CHANGE UP (ref 0–0)
PHOSPHATE SERPL-MCNC: 4.1 MG/DL — SIGNIFICANT CHANGE UP (ref 2.5–4.5)
PLATELET # BLD AUTO: 429 K/UL — HIGH (ref 150–400)
POTASSIUM SERPL-MCNC: 4.6 MMOL/L — SIGNIFICANT CHANGE UP (ref 3.5–5.3)
POTASSIUM SERPL-SCNC: 4.6 MMOL/L — SIGNIFICANT CHANGE UP (ref 3.5–5.3)
PROT SERPL-MCNC: 6.5 G/DL — SIGNIFICANT CHANGE UP (ref 6–8.3)
PROT SERPL-MCNC: 6.6 G/DL — SIGNIFICANT CHANGE UP (ref 6–8.3)
RBC # BLD: 3.94 M/UL — SIGNIFICANT CHANGE UP (ref 3.8–5.2)
RBC # FLD: 16.4 % — HIGH (ref 10.3–14.5)
SODIUM SERPL-SCNC: 135 MMOL/L — SIGNIFICANT CHANGE UP (ref 135–145)
WBC # BLD: 14.34 K/UL — HIGH (ref 3.8–10.5)
WBC # FLD AUTO: 14.34 K/UL — HIGH (ref 3.8–10.5)

## 2022-11-22 PROCEDURE — 93880 EXTRACRANIAL BILAT STUDY: CPT | Mod: 26

## 2022-11-22 RX ADMIN — Medication 6 MILLIGRAM(S): at 06:15

## 2022-11-22 RX ADMIN — Medication 3: at 09:15

## 2022-11-22 RX ADMIN — Medication 8 UNIT(S): at 18:36

## 2022-11-22 RX ADMIN — HEPARIN SODIUM 5000 UNIT(S): 5000 INJECTION INTRAVENOUS; SUBCUTANEOUS at 13:29

## 2022-11-22 RX ADMIN — HEPARIN SODIUM 5000 UNIT(S): 5000 INJECTION INTRAVENOUS; SUBCUTANEOUS at 21:28

## 2022-11-22 RX ADMIN — Medication 5: at 18:37

## 2022-11-22 RX ADMIN — Medication 1200 MILLIGRAM(S): at 17:04

## 2022-11-22 RX ADMIN — Medication 8 UNIT(S): at 13:27

## 2022-11-22 RX ADMIN — Medication 150 MILLIGRAM(S): at 21:27

## 2022-11-22 RX ADMIN — INSULIN GLARGINE 12 UNIT(S): 100 INJECTION, SOLUTION SUBCUTANEOUS at 21:25

## 2022-11-22 RX ADMIN — Medication 3: at 21:26

## 2022-11-22 RX ADMIN — HEPARIN SODIUM 5000 UNIT(S): 5000 INJECTION INTRAVENOUS; SUBCUTANEOUS at 06:15

## 2022-11-22 RX ADMIN — Medication 8 UNIT(S): at 09:14

## 2022-11-22 RX ADMIN — Medication 81 MILLIGRAM(S): at 11:15

## 2022-11-22 RX ADMIN — Medication 4: at 13:27

## 2022-11-22 RX ADMIN — Medication 1200 MILLIGRAM(S): at 06:14

## 2022-11-22 RX ADMIN — Medication 1 APPLICATION(S): at 11:15

## 2022-11-22 RX ADMIN — Medication 1000 UNIT(S): at 11:15

## 2022-11-22 NOTE — PROGRESS NOTE ADULT - ASSESSMENT
77 year old female, PMH of covid 19 4/2020, HTN, HLD, NIDDM, former smoker, quit 15 years ago, vaccinated for covid and boosted in the summer, presents to ER with symptoms of dizziness and weak almost fell down  covid 19    COVID infection  cxr without focal consolidation   initially required O2  s/p remdesivir x 5 day course, completed 11/18  c/w decadron x 10 days or until discharge  ac ppx  trend biomarkers  monitor SpO2  supportive care  isolation per infection control policy     fever- resolved  likely 2/2 covid  blood cultures- no growth final  denies dysuria    leukocytosis   likely 2/2 steroids    vertigo  reports developing vertigo around same time as resp symptoms  had vertigo last time she had covid as well  neurology consulted  CT head pending    Simon Rodriguez M.D.  [facility], Division of Infectious Diseases  [phone]  After 5pm on weekdays and all day on weekends - please call [phone]

## 2022-11-22 NOTE — PROGRESS NOTE ADULT - ASSESSMENT
77 year old female, PMH of HTN, HLD, NIDDM, former smoker, quit 15 years ago, vaccinated for covid and boosted in the summer, presents to ER with symptoms of shortness of breath, cough, congestion, nausea, no vomiting, loose stool. Per pt, she was in usual state of health up until  2 days ago. Last night, patient was taken to  and tested positive for Covid. She was told EKG was abnormal so she came to ED for evaluation. Pt has no current reports of chest pain but endorses weakness and feels like she may pass out, no syncopal episodes. In ER, EKG with no signs of ACS. Cardiac enzymes and troponin are positive. Troponin 169, delta 173. CPK mildly elevated to 329, CKMB 4.6 index is 1.4.  Patient has multiple sick contacts at home,  is flu positive.  Cardiology consulted for possible NSTEMI in setting of covid. Start Remdesivir and Decadron. ID consulted.     Acute respiratory failure with hypoxia due to Covid19  Chest pain, elevated  trop  Diabetes  Hypertension  Acute kidney failure  DVT ppx      11/14/2022:    Acute respiratory failure with hypoxia due to Covid19: she is using 2 L of oxygen : agree with remdesivir as well as dexa: mantain o2 sao2 above 935 all the time:  check d dimer: ordered:  Follow LFTs and renal profile while on remdesivir:   Chest pain, elevated  trop : ? demand ischemia:  would defer to cards / PMD   Diabetes : monitor and control on dexa:   Hypertension ; controlled  Acute kidney failure : creat is downtrending:  cont to follow   DVT ppx    [facility]     11/15:    Acute respiratory failure with hypoxia due to Covid19: she is using 2 L of oxygen  but currently off oxygen : monitor o2 saio2:  keep above 92% all the time:  : agree with remdesivir as well as dexa:  check d dimer: ordered:  Follow LFTs and renal profile while on remdesivir:   Chest pain, elevated  trop : ? demand ischemia:  would defer to cards / PMD   Diabetes : monitor and control on dexa:   Hypertension ; controlled  Acute kidney failure : creat is downtrending:  cont to follow   DVT ppx  ? start lovenox: [facility]     11/16:    Acute respiratory failure with hypoxia due to Covid19: she is using 2 L of oxygen  but currently off oxygen : monitor o2 saio2:  keep above 92% all the time:  : agree with remdesivir as well as dexa:  trend d dimer:   Follow LFTs and renal profile while on remdesivir:   Chest pain, elevated  trop : ? demand ischemia:  would defer to cards / PMD : resolved  Diabetes : monitor and control on dexa:   Hypertension ; controlled  Acute kidney failure : creat is downtrending:  cont to follow   DVT ppx  : on heparin sq tid  [facility]     11/17:    Acute respiratory failure with hypoxia due to Covid19: off oxygen  : monitor o2 saio2:  keep above 92% all the time:  : finished   remdesivir andon  dexa:  trend d dimer: slightly high :  Chest pain, elevated  trop : ? demand ischemia:  would defer to cards / PMD : resolved  Diabetes : monitor and control on dexa:   Hypertension ; controlled  Acute kidney failure : creat is downtrending:  cont to follow   DVT ppx  : on heparin sq tid  [facility]    11/18:    Acute respiratory failure with hypoxia due to Covid19: off oxygen  : monitor o2 sa o2 above 92% all the time:  : On  remdesivir as well as dexa:  trend d dimer: slightly decreased today   Chest pain, elevated  trop : ? demand ischemia:  would defer to cards / PMD : resolved  Diabetes : monitor and control on dexa:   Hypertension ; controlled  Acute kidney failure : creat is downtrending:  cont to follow   DVT ppx  : on heparin sq tid  [facility]    11/19:    Acute respiratory failure with hypoxia due to Covid19: off oxygen  : monitor o2 sa o2 above 92% all the time:  : finished   remdesivir and on  dexa:  trend d dimer: slightly decreased :  VERTIGO: defer to primary team : neuro to see  Chest pain, elevated  trop : ? demand ischemia:  would defer to cards / PMD : resolved  Diabetes : monitor and control on dexa:   Hypertension ; controlled  Acute kidney failure : creat is downtrending:  cont to follow   DVT ppx  : on heparin sq tid  [facility]    11/20:    Acute respiratory failure with hypoxia due to Covid19: off oxygen  : monitor o2 sa o2 above 92% all the time:  : finished   remdesivir and on  dexa:  trend d dimer: slightly decreased : she deos feel SOB on exertion:  do d dimer today  : if increased do cta   VERTIGO: defer to primary team : neuro to see  Chest pain, elevated  trop : ? demand ischemia:  would defer to cards / PMD : resolved  Diabetes : monitor and control on dexa:   Hypertension ; controlled  Acute kidney failure : creat is downtrending:  cont to follow   DVT ppx  : on heparin sq tid  dw acp    11/21:       Acute respiratory failure with hypoxia due to Covid19: off oxygen  : monitor o2 sa o2 above 92% all the time:  : finished   remdesivir and on  dexa:  trend d dimer: slightly decreased : she does feel SOB on exertion:  do d dimer cheked yesteday and it had decreased   :    hence cta was not done  , in addition her renal functions got worse and the clinical probability of pe is pretty low;   VERTIGO: defer to primary team : neuro to see  Chest pain, elevated  trop : ? demand ischemia:  would defer to cards / PMD : resolved  Diabetes : monitor and control on dexa:   Hypertension ; controlled  Acute kidney failure : creat is downtrending:  cont to follow   DVT ppx  : on heparin sq tid  dw PMD  11/22:    Acute respiratory failure with hypoxia due to Covid19: off oxygen  no sob:  repeat d dimer in AM    VERTIGO: defer to primary team :per neurology   Chest pain, elevated  trop : ? demand ischemia:  would defer to cards / PMD : resolved  Diabetes : monitor and control on dexa:   she will be on dexa for total of 10 days:   Hypertension ; controlled  Acute kidney failure : creat is downtrending:  cont to follow   DVT ppx  : on heparin sq tid  dw PMD

## 2022-11-23 LAB
ALBUMIN SERPL ELPH-MCNC: 3.4 G/DL — SIGNIFICANT CHANGE UP (ref 3.3–5)
ALBUMIN SERPL ELPH-MCNC: 3.4 G/DL — SIGNIFICANT CHANGE UP (ref 3.3–5)
ALP SERPL-CCNC: 77 U/L — SIGNIFICANT CHANGE UP (ref 40–120)
ALP SERPL-CCNC: 79 U/L — SIGNIFICANT CHANGE UP (ref 40–120)
ALT FLD-CCNC: 23 U/L — SIGNIFICANT CHANGE UP (ref 4–33)
ALT FLD-CCNC: 24 U/L — SIGNIFICANT CHANGE UP (ref 4–33)
ANION GAP SERPL CALC-SCNC: 11 MMOL/L — SIGNIFICANT CHANGE UP (ref 7–14)
AST SERPL-CCNC: 14 U/L — SIGNIFICANT CHANGE UP (ref 4–32)
AST SERPL-CCNC: 16 U/L — SIGNIFICANT CHANGE UP (ref 4–32)
BILIRUB DIRECT SERPL-MCNC: <0.2 MG/DL — SIGNIFICANT CHANGE UP (ref 0–0.3)
BILIRUB INDIRECT FLD-MCNC: >0 MG/DL — SIGNIFICANT CHANGE UP (ref 0–1)
BILIRUB SERPL-MCNC: 0.2 MG/DL — SIGNIFICANT CHANGE UP (ref 0.2–1.2)
BILIRUB SERPL-MCNC: 0.2 MG/DL — SIGNIFICANT CHANGE UP (ref 0.2–1.2)
BUN SERPL-MCNC: 39 MG/DL — HIGH (ref 7–23)
CALCIUM SERPL-MCNC: 9.3 MG/DL — SIGNIFICANT CHANGE UP (ref 8.4–10.5)
CHLORIDE SERPL-SCNC: 102 MMOL/L — SIGNIFICANT CHANGE UP (ref 98–107)
CO2 SERPL-SCNC: 21 MMOL/L — LOW (ref 22–31)
CREAT SERPL-MCNC: 1.21 MG/DL — SIGNIFICANT CHANGE UP (ref 0.5–1.3)
CREAT SERPL-MCNC: 1.22 MG/DL — SIGNIFICANT CHANGE UP (ref 0.5–1.3)
D DIMER BLD IA.RAPID-MCNC: 332 NG/ML DDU — HIGH
EGFR: 46 ML/MIN/1.73M2 — LOW
EGFR: 47 ML/MIN/1.73M2 — LOW
GLUCOSE SERPL-MCNC: 235 MG/DL — HIGH (ref 70–99)
HCT VFR BLD CALC: 32.6 % — LOW (ref 34.5–45)
HGB BLD-MCNC: 10.3 G/DL — LOW (ref 11.5–15.5)
MAGNESIUM SERPL-MCNC: 1.5 MG/DL — LOW (ref 1.6–2.6)
MCHC RBC-ENTMCNC: 25.2 PG — LOW (ref 27–34)
MCHC RBC-ENTMCNC: 31.6 GM/DL — LOW (ref 32–36)
MCV RBC AUTO: 79.7 FL — LOW (ref 80–100)
NRBC # BLD: 0 /100 WBCS — SIGNIFICANT CHANGE UP (ref 0–0)
NRBC # FLD: 0 K/UL — SIGNIFICANT CHANGE UP (ref 0–0)
PHOSPHATE SERPL-MCNC: 3.3 MG/DL — SIGNIFICANT CHANGE UP (ref 2.5–4.5)
PLATELET # BLD AUTO: 428 K/UL — HIGH (ref 150–400)
POTASSIUM SERPL-MCNC: 4.7 MMOL/L — SIGNIFICANT CHANGE UP (ref 3.5–5.3)
POTASSIUM SERPL-SCNC: 4.7 MMOL/L — SIGNIFICANT CHANGE UP (ref 3.5–5.3)
PROT SERPL-MCNC: 6.7 G/DL — SIGNIFICANT CHANGE UP (ref 6–8.3)
PROT SERPL-MCNC: 6.8 G/DL — SIGNIFICANT CHANGE UP (ref 6–8.3)
RBC # BLD: 4.09 M/UL — SIGNIFICANT CHANGE UP (ref 3.8–5.2)
RBC # FLD: 16.6 % — HIGH (ref 10.3–14.5)
SODIUM SERPL-SCNC: 134 MMOL/L — LOW (ref 135–145)
WBC # BLD: 14.34 K/UL — HIGH (ref 3.8–10.5)
WBC # FLD AUTO: 14.34 K/UL — HIGH (ref 3.8–10.5)

## 2022-11-23 PROCEDURE — 70450 CT HEAD/BRAIN W/O DYE: CPT | Mod: 26

## 2022-11-23 RX ORDER — MECLIZINE HCL 12.5 MG
12.5 TABLET ORAL
Refills: 0 | Status: DISCONTINUED | OUTPATIENT
Start: 2022-11-23 | End: 2022-11-24

## 2022-11-23 RX ORDER — MAGNESIUM SULFATE 500 MG/ML
2 VIAL (ML) INJECTION ONCE
Refills: 0 | Status: COMPLETED | OUTPATIENT
Start: 2022-11-23 | End: 2022-11-23

## 2022-11-23 RX ORDER — INSULIN LISPRO 100/ML
10 VIAL (ML) SUBCUTANEOUS
Refills: 0 | Status: DISCONTINUED | OUTPATIENT
Start: 2022-11-23 | End: 2022-11-24

## 2022-11-23 RX ORDER — SIMETHICONE 80 MG/1
80 TABLET, CHEWABLE ORAL THREE TIMES A DAY
Refills: 0 | Status: DISCONTINUED | OUTPATIENT
Start: 2022-11-23 | End: 2022-11-25

## 2022-11-23 RX ORDER — INSULIN GLARGINE 100 [IU]/ML
15 INJECTION, SOLUTION SUBCUTANEOUS AT BEDTIME
Refills: 0 | Status: DISCONTINUED | OUTPATIENT
Start: 2022-11-23 | End: 2022-11-24

## 2022-11-23 RX ADMIN — BENZOCAINE AND MENTHOL 1 LOZENGE: 5; 1 LIQUID ORAL at 05:04

## 2022-11-23 RX ADMIN — Medication 1200 MILLIGRAM(S): at 05:03

## 2022-11-23 RX ADMIN — Medication 1000 UNIT(S): at 12:10

## 2022-11-23 RX ADMIN — Medication 25 GRAM(S): at 17:42

## 2022-11-23 RX ADMIN — Medication 4: at 09:09

## 2022-11-23 RX ADMIN — Medication 81 MILLIGRAM(S): at 12:10

## 2022-11-23 RX ADMIN — Medication 8 UNIT(S): at 13:16

## 2022-11-23 RX ADMIN — INSULIN GLARGINE 15 UNIT(S): 100 INJECTION, SOLUTION SUBCUTANEOUS at 21:55

## 2022-11-23 RX ADMIN — Medication 1200 MILLIGRAM(S): at 17:42

## 2022-11-23 RX ADMIN — Medication 4: at 18:44

## 2022-11-23 RX ADMIN — HEPARIN SODIUM 5000 UNIT(S): 5000 INJECTION INTRAVENOUS; SUBCUTANEOUS at 05:04

## 2022-11-23 RX ADMIN — Medication 6 MILLIGRAM(S): at 05:03

## 2022-11-23 RX ADMIN — Medication 150 MILLIGRAM(S): at 21:57

## 2022-11-23 RX ADMIN — Medication 5: at 13:16

## 2022-11-23 RX ADMIN — HEPARIN SODIUM 5000 UNIT(S): 5000 INJECTION INTRAVENOUS; SUBCUTANEOUS at 21:57

## 2022-11-23 RX ADMIN — HEPARIN SODIUM 5000 UNIT(S): 5000 INJECTION INTRAVENOUS; SUBCUTANEOUS at 14:11

## 2022-11-23 RX ADMIN — Medication 1 APPLICATION(S): at 12:10

## 2022-11-23 RX ADMIN — Medication 8 UNIT(S): at 09:08

## 2022-11-23 RX ADMIN — Medication 4: at 21:55

## 2022-11-23 RX ADMIN — Medication 10 UNIT(S): at 18:42

## 2022-11-23 NOTE — PROGRESS NOTE ADULT - ASSESSMENT
77 year old female, PMH of HTN, HLD, NIDDM, former smoker, quit 15 years ago, vaccinated for covid and boosted in the summer, presents to ER with symptoms of shortness of breath, cough, congestion, nausea, no vomiting, loose stool. Per pt, she was in usual state of health up until  2 days ago. Last night, patient was taken to  and tested positive for Covid. She was told EKG was abnormal so she came to ED for evaluation. Pt has no current reports of chest pain but endorses weakness and feels like she may pass out, no syncopal episodes. In ER, EKG with no signs of ACS. Cardiac enzymes and troponin are positive. Troponin 169, delta 173. CPK mildly elevated to 329, CKMB 4.6 index is 1.4.  Patient has multiple sick contacts at home,  is flu positive.  Cardiology consulted for possible NSTEMI in setting of covid. Start Remdesivir and Decadron. ID consulted.     Acute respiratory failure with hypoxia due to Covid19  - vaccinated and boosted  - given risk factors, started IV Remdesivir (completed 11/18/22) and decadron (last day 11/24/22)  - risk benefits discussed  - D-dimer slowly downtrending, no need for CTA chest (d/wed with pulm)  - CXR no consolidation  - keep O2 sats above 92%  - Cepacol and antitussives PRN     Chest pain, elevated  trop  - likely demand ischemia per cardiology  - Trop stable x 3  - c/w Asa, hep gtt (s/p Brilinta loaded x 1)  (takes Pravastatin at home). Intolerance to other statins? listed as allergy  - TTE w/o any segmental LV wall motion abnormalities  - house card appreciated    BPV  - trial of meclizine on 11/18 without much improvement  - CT head negative  - appreciate neurology  - c/w PRN Meclizine.   - orthostatics neg.     Diabetes mellitus type 2 w/unspecified complications  - hold home metformin and Glimepiride  - hgbA1c = 7.7  - Insulin Lantus 12 units --> 15 units  - premeal humalog 8 units --> 10 units TID  - sliding scale. cont with basal/bolus insulin as ordered  - may need less dosing once off decadron.    Hypertension  - BP stable  - hold lisinopril given mildly elevated Cr    PALMA  - 2' deydration  - ordered gentle LR 75 cc/hr 11/21/22 x 3 days    DVT ppx    - SC heparin

## 2022-11-23 NOTE — PROGRESS NOTE ADULT - ASSESSMENT
77 year old female, PMH of covid 19 4/2020, HTN, HLD, NIDDM, former smoker, quit 15 years ago, vaccinated for covid and boosted in the summer, presents to ER with symptoms of dizziness and weak almost fell down  covid 19    COVID infection  initially required O2  s/p remdesivir x 5 day course, completed 11/18  c/w decadron x 10 days, last day tomorrow  ac ppx  trend biomarkers  monitor SpO2, supportive care  isolation per infection control policy     fever- resolved  likely 2/2 covid  blood cultures- no growth final  denies dysuria    leukocytosis   likely 2/2 steroids    vertigo  reports developing vertigo around same time as resp symptoms  had vertigo last time she had covid as well  neurology following  s/p carotid US  CT head pending    Simon Rodriguez M.D.  [facility], Division of Infectious Diseases  [phone]  After 5pm on weekdays and all day on weekends - please call [phone]  77 year old female, PMH of covid 19 4/2020, HTN, HLD, NIDDM, former smoker, quit 15 years ago, vaccinated for covid and boosted in the summer, presents to ER with symptoms of dizziness and weak almost fell down  covid 19    COVID infection  initially required O2  s/p remdesivir x 5 day course, completed 11/18  c/w decadron x 10 days, last day tomorrow  ac ppx  trend biomarkers  monitor SpO2, supportive care  isolation per infection control policy     fever- resolved  likely 2/2 covid  blood cultures- no growth final  denies dysuria    leukocytosis   likely 2/2 steroids    vertigo  reports developing vertigo around same time as resp symptoms  had vertigo last time she had covid as well  neurology following  s/p carotid US  CT head pending    abd bloating  yaa Rodriguez M.D.  [facility], Division of Infectious Diseases  [phone]  After 5pm on weekdays and all day on weekends - please call [phone]  77 year old female, PMH of covid 19 4/2020, HTN, HLD, NIDDM, former smoker, quit 15 years ago, vaccinated for covid and boosted in the summer, presents to ER with symptoms of dizziness and weak almost fell down  covid 19    COVID infection  initially required O2  s/p remdesivir x 5 day course, completed 11/18  c/w decadron x 10 days, last day tomorrow  ac ppx  trend biomarkers  monitor SpO2, supportive care  isolation per infection control policy     fever- resolved  likely 2/2 covid  blood cultures- no growth final  denies dysuria    leukocytosis   likely 2/2 steroids    vertigo  reports developing vertigo around same time as resp symptoms  had vertigo last time she had covid as well  neurology following  s/p carotid US  CT head pending    abd bloating  simethicone    We will sign off. Thank you for allowing us to participate in the care of Ms. Brown. Please feel free to call with any questions or concerns.     Simon Rodriguez M.D.  [facility], Division of Infectious Diseases  [phone]  After 5pm on weekdays and all day on weekends - please call [phone]  77 year old female, PMH of covid 19 4/2020, HTN, HLD, NIDDM, former smoker, quit 15 years ago, vaccinated for covid and boosted in the summer, presents to ER with symptoms of dizziness and weak almost fell down  covid 19    COVID infection  initially required O2  s/p remdesivir x 5 day course, completed 11/18  c/w decadron x 10 days, last day tomorrow  ac ppx  trend biomarkers  monitor SpO2, supportive care  isolation per infection control policy     fever- resolved  likely 2/2 covid  blood cultures- no growth final  denies dysuria    leukocytosis   likely 2/2 steroids    vertigo  reports developing vertigo around same time as resp symptoms  had vertigo last time she had covid as well  neurology following  s/p carotid US  CT head pending    abd bloating  yaa Rodriguez M.D.  [facility], Division of Infectious Diseases  [phone]  After 5pm on weekdays and all day on weekends - please call [phone]

## 2022-11-23 NOTE — PROGRESS NOTE ADULT - ASSESSMENT
77 year old female, PMH of HTN, HLD, NIDDM, former smoker, quit 15 years ago, vaccinated for covid and boosted in the summer, presents to ER with symptoms of shortness of breath, cough, congestion, nausea, no vomiting, loose stool. Per pt, she was in usual state of health up until  2 days ago. Last night, patient was taken to  and tested positive for Covid. She was told EKG was abnormal so she came to ED for evaluation. Pt has no current reports of chest pain but endorses weakness and feels like she may pass out, no syncopal episodes. In ER, EKG with no signs of ACS. Cardiac enzymes and troponin are positive. Troponin 169, delta 173. CPK mildly elevated to 329, CKMB 4.6 index is 1.4.  Patient has multiple sick contacts at home,  is flu positive.  Cardiology consulted for possible NSTEMI in setting of covid. Start Remdesivir and Decadron. ID consulted.     Acute respiratory failure with hypoxia due to Covid19  Chest pain, elevated  trop  Diabetes  Hypertension  Acute kidney failure  DVT ppx      11/14/2022:    Acute respiratory failure with hypoxia due to Covid19: she is using 2 L of oxygen : agree with remdesivir as well as dexa: mantain o2 sao2 above 935 all the time:  check d dimer: ordered:  Follow LFTs and renal profile while on remdesivir:   Chest pain, elevated  trop : ? demand ischemia:  would defer to cards / PMD   Diabetes : monitor and control on dexa:   Hypertension ; controlled  Acute kidney failure : creat is downtrending:  cont to follow   DVT ppx    [facility]     11/15:    Acute respiratory failure with hypoxia due to Covid19: she is using 2 L of oxygen  but currently off oxygen : monitor o2 saio2:  keep above 92% all the time:  : agree with remdesivir as well as dexa:  check d dimer: ordered:  Follow LFTs and renal profile while on remdesivir:   Chest pain, elevated  trop : ? demand ischemia:  would defer to cards / PMD   Diabetes : monitor and control on dexa:   Hypertension ; controlled  Acute kidney failure : creat is downtrending:  cont to follow   DVT ppx  ? start lovenox: [facility]     11/16:    Acute respiratory failure with hypoxia due to Covid19: she is using 2 L of oxygen  but currently off oxygen : monitor o2 saio2:  keep above 92% all the time:  : agree with remdesivir as well as dexa:  trend d dimer:   Follow LFTs and renal profile while on remdesivir:   Chest pain, elevated  trop : ? demand ischemia:  would defer to cards / PMD : resolved  Diabetes : monitor and control on dexa:   Hypertension ; controlled  Acute kidney failure : creat is downtrending:  cont to follow   DVT ppx  : on heparin sq tid  [facility]     11/17:    Acute respiratory failure with hypoxia due to Covid19: off oxygen  : monitor o2 saio2:  keep above 92% all the time:  : finished   remdesivir andon  dexa:  trend d dimer: slightly high :  Chest pain, elevated  trop : ? demand ischemia:  would defer to cards / PMD : resolved  Diabetes : monitor and control on dexa:   Hypertension ; controlled  Acute kidney failure : creat is downtrending:  cont to follow   DVT ppx  : on heparin sq tid  [facility]    11/18:    Acute respiratory failure with hypoxia due to Covid19: off oxygen  : monitor o2 sa o2 above 92% all the time:  : On  remdesivir as well as dexa:  trend d dimer: slightly decreased today   Chest pain, elevated  trop : ? demand ischemia:  would defer to cards / PMD : resolved  Diabetes : monitor and control on dexa:   Hypertension ; controlled  Acute kidney failure : creat is downtrending:  cont to follow   DVT ppx  : on heparin sq tid  [facility]    11/19:    Acute respiratory failure with hypoxia due to Covid19: off oxygen  : monitor o2 sa o2 above 92% all the time:  : finished   remdesivir and on  dexa:  trend d dimer: slightly decreased :  VERTIGO: defer to primary team : neuro to see  Chest pain, elevated  trop : ? demand ischemia:  would defer to cards / PMD : resolved  Diabetes : monitor and control on dexa:   Hypertension ; controlled  Acute kidney failure : creat is downtrending:  cont to follow   DVT ppx  : on heparin sq tid  [facility]    11/20:    Acute respiratory failure with hypoxia due to Covid19: off oxygen  : monitor o2 sa o2 above 92% all the time:  : finished   remdesivir and on  dexa:  trend d dimer: slightly decreased : she deos feel SOB on exertion:  do d dimer today  : if increased do cta   VERTIGO: defer to primary team : neuro to see  Chest pain, elevated  trop : ? demand ischemia:  would defer to cards / PMD : resolved  Diabetes : monitor and control on dexa:   Hypertension ; controlled  Acute kidney failure : creat is downtrending:  cont to follow   DVT ppx  : on heparin sq tid  dw acp    11/21:       Acute respiratory failure with hypoxia due to Covid19: off oxygen  : monitor o2 sa o2 above 92% all the time:  : finished   remdesivir and on  dexa:  trend d dimer: slightly decreased : she does feel SOB on exertion:  do d dimer cheked yesteday and it had decreased   :    hence cta was not done  , in addition her renal functions got worse and the clinical probability of pe is pretty low;   VERTIGO: defer to primary team : neuro to see  Chest pain, elevated  trop : ? demand ischemia:  would defer to cards / PMD : resolved  Diabetes : monitor and control on dexa:   Hypertension ; controlled  Acute kidney failure : creat is downtrending:  cont to follow   DVT ppx  : on heparin sq tid  dw PMD  11/22:    Acute respiratory failure with hypoxia due to Covid19: off oxygen  no sob:  repeat d dimer in AM    VERTIGO: defer to primary team :per neurology   Chest pain, elevated  trop : ? demand ischemia:  would defer to cards / PMD : resolved  Diabetes : monitor and control on dexa:   she will be on dexa for total of 10 days:   Hypertension ; controlled  Acute kidney failure : creat is downtrending:  cont to follow   DVT ppx  : on heparin sq tid  dw PMD    11/23:      Acute respiratory failure with hypoxia due to Covid19: off oxygen  no sob:  d dimer further decreased : on rooma ir  VERTIGO: defer to primary team :per neurology   Chest pain, elevated  trop : ? demand ischemia:  would defer to cards / PMD : resolved  Diabetes : monitor and control on dexa:   she will be on dexa for total of 10 days:   Hypertension ; controlled  Acute kidney failure : creat is downtrending:  cont to follow   DVT ppx  : on heparin sq tid  dw PMD

## 2022-11-24 LAB
ANION GAP SERPL CALC-SCNC: 14 MMOL/L — SIGNIFICANT CHANGE UP (ref 7–14)
BUN SERPL-MCNC: 37 MG/DL — HIGH (ref 7–23)
CALCIUM SERPL-MCNC: 9.4 MG/DL — SIGNIFICANT CHANGE UP (ref 8.4–10.5)
CHLORIDE SERPL-SCNC: 101 MMOL/L — SIGNIFICANT CHANGE UP (ref 98–107)
CO2 SERPL-SCNC: 19 MMOL/L — LOW (ref 22–31)
CREAT SERPL-MCNC: 1.27 MG/DL — SIGNIFICANT CHANGE UP (ref 0.5–1.3)
EGFR: 44 ML/MIN/1.73M2 — LOW
GLUCOSE SERPL-MCNC: 200 MG/DL — HIGH (ref 70–99)
HCT VFR BLD CALC: 31.2 % — LOW (ref 34.5–45)
HGB BLD-MCNC: 9.8 G/DL — LOW (ref 11.5–15.5)
MAGNESIUM SERPL-MCNC: 2 MG/DL — SIGNIFICANT CHANGE UP (ref 1.6–2.6)
MCHC RBC-ENTMCNC: 24.9 PG — LOW (ref 27–34)
MCHC RBC-ENTMCNC: 31.4 GM/DL — LOW (ref 32–36)
MCV RBC AUTO: 79.4 FL — LOW (ref 80–100)
NRBC # BLD: 0 /100 WBCS — SIGNIFICANT CHANGE UP (ref 0–0)
NRBC # FLD: 0 K/UL — SIGNIFICANT CHANGE UP (ref 0–0)
PHOSPHATE SERPL-MCNC: 3.6 MG/DL — SIGNIFICANT CHANGE UP (ref 2.5–4.5)
PLATELET # BLD AUTO: 393 K/UL — SIGNIFICANT CHANGE UP (ref 150–400)
POTASSIUM SERPL-MCNC: 4.5 MMOL/L — SIGNIFICANT CHANGE UP (ref 3.5–5.3)
POTASSIUM SERPL-SCNC: 4.5 MMOL/L — SIGNIFICANT CHANGE UP (ref 3.5–5.3)
RBC # BLD: 3.93 M/UL — SIGNIFICANT CHANGE UP (ref 3.8–5.2)
RBC # FLD: 16.4 % — HIGH (ref 10.3–14.5)
SODIUM SERPL-SCNC: 134 MMOL/L — LOW (ref 135–145)
WBC # BLD: 15.39 K/UL — HIGH (ref 3.8–10.5)
WBC # FLD AUTO: 15.39 K/UL — HIGH (ref 3.8–10.5)

## 2022-11-24 RX ORDER — INSULIN GLARGINE 100 [IU]/ML
18 INJECTION, SOLUTION SUBCUTANEOUS AT BEDTIME
Refills: 0 | Status: DISCONTINUED | OUTPATIENT
Start: 2022-11-24 | End: 2022-11-25

## 2022-11-24 RX ORDER — INSULIN LISPRO 100/ML
14 VIAL (ML) SUBCUTANEOUS
Refills: 0 | Status: DISCONTINUED | OUTPATIENT
Start: 2022-11-24 | End: 2022-11-25

## 2022-11-24 RX ORDER — MECLIZINE HCL 12.5 MG
25 TABLET ORAL
Refills: 0 | Status: DISCONTINUED | OUTPATIENT
Start: 2022-11-24 | End: 2022-11-25

## 2022-11-24 RX ADMIN — Medication 1200 MILLIGRAM(S): at 05:48

## 2022-11-24 RX ADMIN — Medication 10 UNIT(S): at 10:11

## 2022-11-24 RX ADMIN — HEPARIN SODIUM 5000 UNIT(S): 5000 INJECTION INTRAVENOUS; SUBCUTANEOUS at 05:50

## 2022-11-24 RX ADMIN — Medication 150 MILLIGRAM(S): at 21:48

## 2022-11-24 RX ADMIN — HEPARIN SODIUM 5000 UNIT(S): 5000 INJECTION INTRAVENOUS; SUBCUTANEOUS at 14:41

## 2022-11-24 RX ADMIN — Medication 4: at 21:45

## 2022-11-24 RX ADMIN — Medication 1 APPLICATION(S): at 11:58

## 2022-11-24 RX ADMIN — Medication 14 UNIT(S): at 18:31

## 2022-11-24 RX ADMIN — INSULIN GLARGINE 15 UNIT(S): 100 INJECTION, SOLUTION SUBCUTANEOUS at 21:49

## 2022-11-24 RX ADMIN — Medication 4: at 10:11

## 2022-11-24 RX ADMIN — HEPARIN SODIUM 5000 UNIT(S): 5000 INJECTION INTRAVENOUS; SUBCUTANEOUS at 21:48

## 2022-11-24 RX ADMIN — Medication 10 UNIT(S): at 12:23

## 2022-11-24 RX ADMIN — Medication 1200 MILLIGRAM(S): at 18:01

## 2022-11-24 RX ADMIN — Medication 81 MILLIGRAM(S): at 11:58

## 2022-11-24 RX ADMIN — Medication 6: at 12:24

## 2022-11-24 RX ADMIN — Medication 6 MILLIGRAM(S): at 05:48

## 2022-11-24 RX ADMIN — Medication 5: at 18:32

## 2022-11-24 RX ADMIN — Medication 1000 UNIT(S): at 11:56

## 2022-11-24 NOTE — PROGRESS NOTE ADULT - NSPROGADDITIONALINFOA_GEN_ALL_CORE
dc planning to rehab if her vertigo symptoms improves.  monitor hyperglycemia on decadron. insulin dose adjusted.    d/w pt and PA.     - Dr. JOE Padilla ([facility])  - [phone]
d/w pt and NP.  d/w ID.    Sean Mcbride will be covering for the pt starting 11/16/22. He can be reached at  if needed.     - Dr. JOE Padilla ([facility])  - [phone]
dc planning to rehab if her vertigo symptoms improves/ stable.   monitor hyperglycemia on decadron, completed. insulin dose adjusted.   anticipate improves sugars after being off steroids.     d/w pt and PA.     - Dr. JOE Padilla ([facility])  - [phone]

## 2022-11-24 NOTE — PROGRESS NOTE ADULT - ASSESSMENT
77 year old female, PMH of HTN, HLD, NIDDM, former smoker, quit 15 years ago, vaccinated for covid and boosted in the summer, presents to ER with symptoms of shortness of breath, cough, congestion, nausea, no vomiting, loose stool. Per pt, she was in usual state of health up until  2 days ago. Last night, patient was taken to  and tested positive for Covid. She was told EKG was abnormal so she came to ED for evaluation. Pt has no current reports of chest pain but endorses weakness and feels like she may pass out, no syncopal episodes. In ER, EKG with no signs of ACS. Cardiac enzymes and troponin are positive. Troponin 169, delta 173. CPK mildly elevated to 329, CKMB 4.6 index is 1.4.  Patient has multiple sick contacts at home,  is flu positive.  Cardiology consulted for possible NSTEMI in setting of covid. Start Remdesivir and Decadron. ID consulted.     Acute respiratory failure with hypoxia due to Covid19  Chest pain, elevated  trop  Diabetes  Hypertension  Acute kidney failure  DVT ppx      11/14/2022:    Acute respiratory failure with hypoxia due to Covid19: she is using 2 L of oxygen : agree with remdesivir as well as dexa: mantain o2 sao2 above 935 all the time:  check d dimer: ordered:  Follow LFTs and renal profile while on remdesivir:   Chest pain, elevated  trop : ? demand ischemia:  would defer to cards / PMD   Diabetes : monitor and control on dexa:   Hypertension ; controlled  Acute kidney failure : creat is downtrending:  cont to follow   DVT ppx    [facility]     11/15:    Acute respiratory failure with hypoxia due to Covid19: she is using 2 L of oxygen  but currently off oxygen : monitor o2 saio2:  keep above 92% all the time:  : agree with remdesivir as well as dexa:  check d dimer: ordered:  Follow LFTs and renal profile while on remdesivir:   Chest pain, elevated  trop : ? demand ischemia:  would defer to cards / PMD   Diabetes : monitor and control on dexa:   Hypertension ; controlled  Acute kidney failure : creat is downtrending:  cont to follow   DVT ppx  ? start lovenox: [facility]     11/16:    Acute respiratory failure with hypoxia due to Covid19: she is using 2 L of oxygen  but currently off oxygen : monitor o2 saio2:  keep above 92% all the time:  : agree with remdesivir as well as dexa:  trend d dimer:   Follow LFTs and renal profile while on remdesivir:   Chest pain, elevated  trop : ? demand ischemia:  would defer to cards / PMD : resolved  Diabetes : monitor and control on dexa:   Hypertension ; controlled  Acute kidney failure : creat is downtrending:  cont to follow   DVT ppx  : on heparin sq tid  [facility]     11/17:    Acute respiratory failure with hypoxia due to Covid19: off oxygen  : monitor o2 saio2:  keep above 92% all the time:  : finished   remdesivir andon  dexa:  trend d dimer: slightly high :  Chest pain, elevated  trop : ? demand ischemia:  would defer to cards / PMD : resolved  Diabetes : monitor and control on dexa:   Hypertension ; controlled  Acute kidney failure : creat is downtrending:  cont to follow   DVT ppx  : on heparin sq tid  [facility]    11/18:    Acute respiratory failure with hypoxia due to Covid19: off oxygen  : monitor o2 sa o2 above 92% all the time:  : On  remdesivir as well as dexa:  trend d dimer: slightly decreased today   Chest pain, elevated  trop : ? demand ischemia:  would defer to cards / PMD : resolved  Diabetes : monitor and control on dexa:   Hypertension ; controlled  Acute kidney failure : creat is downtrending:  cont to follow   DVT ppx  : on heparin sq tid  [facility]    11/19:    Acute respiratory failure with hypoxia due to Covid19: off oxygen  : monitor o2 sa o2 above 92% all the time:  : finished   remdesivir and on  dexa:  trend d dimer: slightly decreased :  VERTIGO: defer to primary team : neuro to see  Chest pain, elevated  trop : ? demand ischemia:  would defer to cards / PMD : resolved  Diabetes : monitor and control on dexa:   Hypertension ; controlled  Acute kidney failure : creat is downtrending:  cont to follow   DVT ppx  : on heparin sq tid  [facility]    11/20:    Acute respiratory failure with hypoxia due to Covid19: off oxygen  : monitor o2 sa o2 above 92% all the time:  : finished   remdesivir and on  dexa:  trend d dimer: slightly decreased : she deos feel SOB on exertion:  do d dimer today  : if increased do cta   VERTIGO: defer to primary team : neuro to see  Chest pain, elevated  trop : ? demand ischemia:  would defer to cards / PMD : resolved  Diabetes : monitor and control on dexa:   Hypertension ; controlled  Acute kidney failure : creat is downtrending:  cont to follow   DVT ppx  : on heparin sq tid  [facility]    11/21:       Acute respiratory failure with hypoxia due to Covid19: off oxygen  : monitor o2 sa o2 above 92% all the time:  : finished   remdesivir and on  dexa:  trend d dimer: slightly decreased : she does feel SOB on exertion:  do d dimer cheked yesteday and it had decreased   :    hence cta was not done  , in addition her renal functions got worse and the clinical probability of pe is pretty low;   VERTIGO: defer to primary team : neuro to see  Chest pain, elevated  trop : ? demand ischemia:  would defer to cards / PMD : resolved  Diabetes : monitor and control on dexa:   Hypertension ; controlled  Acute kidney failure : creat is downtrending:  cont to follow   DVT ppx  : on heparin sq tid   PMD  11/22:    Acute respiratory failure with hypoxia due to Covid19: off oxygen  no sob:  repeat d dimer in AM    VERTIGO: defer to primary team :per neurology   Chest pain, elevated  trop : ? demand ischemia:  would defer to cards / PMD : resolved  Diabetes : monitor and control on dexa:   she will be on dexa for total of 10 days:   Hypertension ; controlled  Acute kidney failure : creat is downtrending:  cont to follow   DVT ppx  : on heparin sq tid   PMD    11/23:      Acute respiratory failure with hypoxia due to Covid19: off oxygen  no sob:  d dimer further decreased : on rooma ir  VERTIGO: defer to primary team :per neurology   Chest pain, elevated  trop : ? demand ischemia:  would defer to cards / PMD : resolved  Diabetes : monitor and control on dexa:   she will be on dexa for total of 10 days:   Hypertension ; controlled  Acute kidney failure : creat is downtrending:  cont to follow   DVT ppx  : on heparin sq tid   PMD    11/24:    Acute respiratory failure with hypoxia due to Covid19: off oxygen  no sob:  d dimer further decreased : on room air  VERTIGO: defer to primary team :per neurology   Chest pain, elevated  trop : per cards  Diabetes : dexa completed  Hypertension ; controlled  Acute kidney failure : creat is downtrending:  cont to follow  ; 1.27 today  DVT ppx  : on heparin sq tid  dw acp

## 2022-11-24 NOTE — PROGRESS NOTE ADULT - ASSESSMENT
77 year old female, PMH of HTN, HLD, NIDDM, former smoker, quit 15 years ago, vaccinated for covid and boosted in the summer, presents to ER with symptoms of shortness of breath, cough, congestion, nausea, no vomiting, loose stool. Per pt, she was in usual state of health up until  2 days ago. Last night, patient was taken to  and tested positive for Covid. She was told EKG was abnormal so she came to ED for evaluation. Pt has no current reports of chest pain but endorses weakness and feels like she may pass out, no syncopal episodes. In ER, EKG with no signs of ACS. Cardiac enzymes and troponin are positive. Troponin 169, delta 173. CPK mildly elevated to 329, CKMB 4.6 index is 1.4.  Patient has multiple sick contacts at home,  is flu positive.  Cardiology consulted for possible NSTEMI in setting of covid. Start Remdesivir and Decadron. ID consulted.     Acute respiratory failure with hypoxia due to Covid19  - vaccinated and boosted  - given risk factors, started IV Remdesivir (completed 11/18/22) and decadron (last day 11/24/22)  - risk benefits discussed  - D-dimer slowly downtrending, no need for CTA chest (d/wed with pulm)  - CXR no consolidation  - keep O2 sats above 92%  - Cepacol and antitussives PRN     Chest pain, elevated  trop  - likely demand ischemia per cardiology  - Trop stable x 3  - c/w Asa, hep gtt (s/p Brilinta loaded x 1)  (takes Pravastatin at home). Intolerance to other statins? listed as allergy  - TTE w/o any segmental LV wall motion abnormalities  - house card appreciated    BPV  - trial of meclizine 12.5 mg on 11/18 without much improvement  - CT head negative  - appreciate neurology  - c/w PRN Meclizine.   - she wants to try higher dose of Meclizine. increased to 25 mg PRN  - orthostatics neg.     Diabetes mellitus type 2 w/unspecified complications  - hold home metformin and Glimepiride  - hgbA1c = 7.7  - Insulin Lantus 12 units --> 15 units --> can increase to 18 units QHS.   - premeal humalog 8 units --> 10 units TID --> 14 units  - sliding scale. cont with basal/bolus insulin as ordered  - may need less dosing once off decadron.    Hypertension  - BP stable  - hold lisinopril given mildly elevated Cr    PALMA  - 2' deydration  - ordered gentle LR 75 cc/hr 11/21/22 x 3 days    DVT ppx    - SC heparin

## 2022-11-25 ENCOUNTER — TRANSCRIPTION ENCOUNTER (OUTPATIENT)
Age: 77
End: 2022-11-25

## 2022-11-25 VITALS
SYSTOLIC BLOOD PRESSURE: 112 MMHG | RESPIRATION RATE: 17 BRPM | TEMPERATURE: 98 F | OXYGEN SATURATION: 97 % | HEART RATE: 98 BPM | DIASTOLIC BLOOD PRESSURE: 58 MMHG

## 2022-11-25 LAB
ANION GAP SERPL CALC-SCNC: 10 MMOL/L — SIGNIFICANT CHANGE UP (ref 7–14)
BUN SERPL-MCNC: 37 MG/DL — HIGH (ref 7–23)
CALCIUM SERPL-MCNC: 9.6 MG/DL — SIGNIFICANT CHANGE UP (ref 8.4–10.5)
CHLORIDE SERPL-SCNC: 104 MMOL/L — SIGNIFICANT CHANGE UP (ref 98–107)
CO2 SERPL-SCNC: 22 MMOL/L — SIGNIFICANT CHANGE UP (ref 22–31)
CREAT SERPL-MCNC: 1.34 MG/DL — HIGH (ref 0.5–1.3)
EGFR: 41 ML/MIN/1.73M2 — LOW
GLUCOSE SERPL-MCNC: 187 MG/DL — HIGH (ref 70–99)
HCT VFR BLD CALC: 31.7 % — LOW (ref 34.5–45)
HGB BLD-MCNC: 10 G/DL — LOW (ref 11.5–15.5)
MAGNESIUM SERPL-MCNC: 1.5 MG/DL — LOW (ref 1.6–2.6)
MCHC RBC-ENTMCNC: 25.1 PG — LOW (ref 27–34)
MCHC RBC-ENTMCNC: 31.5 GM/DL — LOW (ref 32–36)
MCV RBC AUTO: 79.6 FL — LOW (ref 80–100)
NRBC # BLD: 0 /100 WBCS — SIGNIFICANT CHANGE UP (ref 0–0)
NRBC # FLD: 0 K/UL — SIGNIFICANT CHANGE UP (ref 0–0)
PHOSPHATE SERPL-MCNC: 3.6 MG/DL — SIGNIFICANT CHANGE UP (ref 2.5–4.5)
PLATELET # BLD AUTO: 363 K/UL — SIGNIFICANT CHANGE UP (ref 150–400)
POTASSIUM SERPL-MCNC: 4.2 MMOL/L — SIGNIFICANT CHANGE UP (ref 3.5–5.3)
POTASSIUM SERPL-SCNC: 4.2 MMOL/L — SIGNIFICANT CHANGE UP (ref 3.5–5.3)
RBC # BLD: 3.98 M/UL — SIGNIFICANT CHANGE UP (ref 3.8–5.2)
RBC # FLD: 16.6 % — HIGH (ref 10.3–14.5)
SODIUM SERPL-SCNC: 136 MMOL/L — SIGNIFICANT CHANGE UP (ref 135–145)
WBC # BLD: 16.7 K/UL — HIGH (ref 3.8–10.5)
WBC # FLD AUTO: 16.7 K/UL — HIGH (ref 3.8–10.5)

## 2022-11-25 RX ORDER — INSULIN GLARGINE 100 [IU]/ML
16 INJECTION, SOLUTION SUBCUTANEOUS AT BEDTIME
Refills: 0 | Status: DISCONTINUED | OUTPATIENT
Start: 2022-11-25 | End: 2022-11-25

## 2022-11-25 RX ORDER — MECLIZINE HCL 12.5 MG
1 TABLET ORAL
Qty: 0 | Refills: 0 | DISCHARGE
Start: 2022-11-25

## 2022-11-25 RX ORDER — MAGNESIUM SULFATE 500 MG/ML
2 VIAL (ML) INJECTION ONCE
Refills: 0 | Status: COMPLETED | OUTPATIENT
Start: 2022-11-25 | End: 2022-11-25

## 2022-11-25 RX ORDER — LANOLIN ALCOHOL/MO/W.PET/CERES
1 CREAM (GRAM) TOPICAL
Qty: 0 | Refills: 0 | DISCHARGE
Start: 2022-11-25

## 2022-11-25 RX ORDER — LISINOPRIL 2.5 MG/1
1 TABLET ORAL
Qty: 0 | Refills: 0 | DISCHARGE

## 2022-11-25 RX ORDER — ALBUTEROL 90 UG/1
2 AEROSOL, METERED ORAL
Qty: 0 | Refills: 0 | DISCHARGE
Start: 2022-11-25

## 2022-11-25 RX ORDER — INSULIN LISPRO 100/ML
0 VIAL (ML) SUBCUTANEOUS
Qty: 0 | Refills: 0 | DISCHARGE
Start: 2022-11-25

## 2022-11-25 RX ADMIN — HEPARIN SODIUM 5000 UNIT(S): 5000 INJECTION INTRAVENOUS; SUBCUTANEOUS at 05:53

## 2022-11-25 RX ADMIN — Medication 25 GRAM(S): at 10:48

## 2022-11-25 RX ADMIN — Medication 1: at 09:23

## 2022-11-25 RX ADMIN — Medication 1200 MILLIGRAM(S): at 05:52

## 2022-11-25 RX ADMIN — HEPARIN SODIUM 5000 UNIT(S): 5000 INJECTION INTRAVENOUS; SUBCUTANEOUS at 13:21

## 2022-11-25 RX ADMIN — Medication 1 APPLICATION(S): at 12:37

## 2022-11-25 RX ADMIN — Medication 81 MILLIGRAM(S): at 12:01

## 2022-11-25 RX ADMIN — Medication 1000 UNIT(S): at 12:01

## 2022-11-25 RX ADMIN — Medication 4: at 13:17

## 2022-11-25 RX ADMIN — Medication 14 UNIT(S): at 09:22

## 2022-11-25 RX ADMIN — Medication 14 UNIT(S): at 13:17

## 2022-11-25 NOTE — DISCHARGE NOTE NURSING/CASE MANAGEMENT/SOCIAL WORK - NSDCFUADDAPPT_GEN_ALL_CORE_FT
Follow up with PCP within 1-2 weeks of discharge. If you are in need of a general medicine physician and post-discharge medical follow-up for further care/recommendations you may contact the [facility] for an appointment at [phone].     Follow up with neurology for dizziness  within 1-2 weeks of discharge. You may follow up at [address] [place] [address]. Please call [phone] for an appointment or you may follow up with Dr. Astudillo.

## 2022-11-25 NOTE — PROGRESS NOTE ADULT - SUBJECTIVE AND OBJECTIVE BOX
Cardiology Progress Note    Interval Events:  No significant events      MEDICATIONS:  aspirin enteric coated 81 milliGRAM(s) Oral daily  heparin   Injectable 5000 Unit(s) SubCutaneous every 8 hours  remdesivir  IVPB   IV Intermittent   remdesivir  IVPB 100 milliGRAM(s) IV Intermittent every 24 hours  albuterol    90 MICROgram(s) HFA Inhaler 2 Puff(s) Inhalation every 6 hours PRN  benzonatate 100 milliGRAM(s) Oral three times a day PRN  guaiFENesin ER 1200 milliGRAM(s) Oral every 12 hours  acetaminophen     Tablet .. 650 milliGRAM(s) Oral every 6 hours PRN  melatonin 3 milliGRAM(s) Oral at bedtime PRN  ondansetron Injectable 4 milliGRAM(s) IV Push every 8 hours PRN  traZODone 150 milliGRAM(s) Oral at bedtime  aluminum hydroxide/magnesium hydroxide/simethicone Suspension 30 milliLiter(s) Oral every 4 hours PRN    dexAMETHasone  Injectable 6 milliGRAM(s) IV Push daily  dextrose 50% Injectable 25 Gram(s) IV Push once  dextrose 50% Injectable 12.5 Gram(s) IV Push once  dextrose 50% Injectable 25 Gram(s) IV Push once  dextrose Oral Gel 15 Gram(s) Oral once PRN  glucagon  Injectable 1 milliGRAM(s) IntraMuscular once  insulin glargine Injectable (LANTUS) 5 Unit(s) SubCutaneous at bedtime  insulin lispro (ADMELOG) corrective regimen sliding scale   SubCutaneous three times a day before meals  benzocaine 15 mG/menthol 3.6 mG Lozenge 1 Lozenge Oral every 4 hours PRN  cholecalciferol 1000 Unit(s) Oral daily  dextrose 5%. 1000 milliLiter(s) IV Continuous <Continuous>  dextrose 5%. 1000 milliLiter(s) IV Continuous <Continuous>  influenza  Vaccine (HIGH DOSE) 0.7 milliLiter(s) IntraMuscular once  petrolatum white Ointment 1 Application(s) Topical daily  potassium chloride    Tablet ER 40 milliEquivalent(s) Oral once      PHYSICAL EXAM:  T(C): 36.7 (11-17-22 @ 11:31), Max: 37 (11-17-22 @ 05:23)  HR: 99 (11-17-22 @ 11:30) (89 - 99)  BP: 121/52 (11-17-22 @ 11:30) (114/63 - 134/74)  RR: 19 (11-17-22 @ 11:30) (18 - 20)  SpO2: 95% (11-17-22 @ 11:30) (95% - 97%)  Wt(kg): --  I&O's Summary    16 Nov 2022 07:01  -  17 Nov 2022 07:00  --------------------------------------------------------  IN: 200 mL / OUT: 800 mL / NET: -600 mL    Appearance: No acute distress  HEENT:   mmm  Cardiovascular: Normal S1 S2, tachycardic, no elevated JVP, no edema  Respiratory: Lungs clear to auscultation	, good air movement  Psychiatry: A & O x 3, Mood & affect appropriate  Gastrointestinal:  soft nt nd normoactive bs	  Skin: No rashes, no ecchymoses, no cyanosis	  Neurologic: grossly non-focal  Extremities: Normal range of motion, no clubbing, cyanosis or edema  Vascular: Peripheral pulses palpable bilaterally    LABS:	 	  CBC Full  -  ( 17 Nov 2022 06:43 )  WBC Count : 9.08 K/uL  Hemoglobin : 10.2 g/dL  Hematocrit : 31.2 %  Platelet Count - Automated : 284 K/uL  Mean Cell Volume : 77.2 fL  Mean Cell Hemoglobin : 25.2 pg  Mean Cell Hemoglobin Concentration : 32.7 gm/dL  Auto Neutrophil # : 6.93 K/uL  Auto Lymphocyte # : 1.46 K/uL  Auto Monocyte # : 0.59 K/uL  Auto Eosinophil # : 0.05 K/uL  Auto Basophil # : 0.01 K/uL  Auto Neutrophil % : 76.3 %  Auto Lymphocyte % : 16.1 %  Auto Monocyte % : 6.5 %  Auto Eosinophil % : 0.6 %  Auto Basophil % : 0.1 %    11-17    136  |  102  |  34<H>  ----------------------------<  202<H>  4.0   |  21<L>  |  1.22  11-16    137  |  101  |  24<H>  ----------------------------<  191<H>  3.6   |  20<L>  |  1.15    Ca    9.2      17 Nov 2022 06:43  Ca    9.2      16 Nov 2022 06:17    TPro  6.6  /  Alb  3.2<L>  /  TBili  0.4  /  DBili  <0.2  /  AST  47<H>  /  ALT  35<H>  /  AlkPhos  82  11-17  TPro  6.9  /  Alb  3.5  /  TBili  0.5  /  DBili  x   /  AST  69<H>  /  ALT  35<H>  /  AlkPhos  90  11-16      TELEMETRY: 	  SR  ECG:  	  RADIOLOGY:  OTHER: 	    PREVIOUS DIAGNOSTIC TESTING:    [ ] Echocardiogram:   [ ] Catheterization:  [ ] Stress Test:  	
Cardiology Progress Note    Interval Events:  Still feels tired and intermittent feverish  Breathing is stable      MEDICATIONS:  aspirin enteric coated 81 milliGRAM(s) Oral daily  heparin   Injectable 5000 Unit(s) SubCutaneous every 8 hours  remdesivir  IVPB 100 milliGRAM(s) IV Intermittent every 24 hours  remdesivir  IVPB   IV Intermittent   albuterol/ipratropium for Nebulization 3 milliLiter(s) Nebulizer every 6 hours  benzonatate 100 milliGRAM(s) Oral three times a day PRN  guaiFENesin ER 1200 milliGRAM(s) Oral every 12 hours  acetaminophen     Tablet .. 650 milliGRAM(s) Oral every 6 hours PRN  melatonin 3 milliGRAM(s) Oral at bedtime PRN  ondansetron Injectable 4 milliGRAM(s) IV Push every 8 hours PRN  traZODone 150 milliGRAM(s) Oral at bedtime  aluminum hydroxide/magnesium hydroxide/simethicone Suspension 30 milliLiter(s) Oral every 4 hours PRN  dexAMETHasone  Injectable 6 milliGRAM(s) IV Push daily  dextrose 50% Injectable 25 Gram(s) IV Push once  dextrose 50% Injectable 12.5 Gram(s) IV Push once  dextrose 50% Injectable 25 Gram(s) IV Push once  dextrose Oral Gel 15 Gram(s) Oral once PRN  glucagon  Injectable 1 milliGRAM(s) IntraMuscular once  insulin glargine Injectable (LANTUS) 5 Unit(s) SubCutaneous at bedtime  insulin lispro (ADMELOG) corrective regimen sliding scale   SubCutaneous three times a day before meals  benzocaine 15 mG/menthol 3.6 mG Lozenge 1 Lozenge Oral every 4 hours PRN  cholecalciferol 1000 Unit(s) Oral daily  dextrose 5%. 1000 milliLiter(s) IV Continuous <Continuous>  dextrose 5%. 1000 milliLiter(s) IV Continuous <Continuous>  influenza  Vaccine (HIGH DOSE) 0.7 milliLiter(s) IntraMuscular once  potassium chloride    Tablet ER 40 milliEquivalent(s) Oral once    PHYSICAL EXAM:  T(C): 36.9 (11-16-22 @ 11:39), Max: 38.6 (11-15-22 @ 22:39)  HR: 98 (11-16-22 @ 11:39) (90 - 127)  BP: 124/70 (11-16-22 @ 11:39) (120/87 - 159/115)  RR: 20 (11-16-22 @ 11:39) (20 - 23)  SpO2: 98% (11-16-22 @ 11:39) (97% - 100%)  Wt(kg): --  I&O's Summary    Appearance: No acute distress  HEENT:   mmm  Cardiovascular: Normal S1 S2, tachycardic, no elevated JVP, no edema  Respiratory: Lungs clear to auscultation	, good air movement  Psychiatry: A & O x 3, Mood & affect appropriate  Gastrointestinal:  soft nt nd normoactive bs	  Skin: No rashes, no ecchymoses, no cyanosis	  Neurologic: grossly non-focal  Extremities: Normal range of motion, no clubbing, cyanosis or edema  Vascular: Peripheral pulses palpable bilaterally    LABS:	 	  CBC Full  -  ( 16 Nov 2022 06:17 )  WBC Count : 11.56 K/uL  Hemoglobin : 10.5 g/dL  Hematocrit : 32.9 %  Platelet Count - Automated : 265 K/uL    11-16  137  |  101  |  24<H>  ----------------------------<  191<H>  3.6   |  20<L>  |  1.15    11-15  140  |  105  |  20  ----------------------------<  165<H>  3.4<L>   |  19<L>  |  1.10    Ca    9.2      16 Nov 2022 06:17  Ca    9.1      15 Nov 2022 06:48    TPro  6.9  /  Alb  3.5  /  TBili  0.5  /  DBili  x   /  AST  69<H>  /  ALT  35<H>  /  AlkPhos  90  11-16  TPro  7.6  /  Alb  3.7  /  TBili  0.2  /  DBili  x   /  AST  54<H>  /  ALT  28  /  AlkPhos  97  11-15      proBNP:   Lipid Profile:   HgA1c:   TSH: Thyroid Stimulating Hormone, Serum: 0.71 uIU/mL (11-15 @ 06:48)      CARDIAC MARKERS:      TELEMETRY: 	SR    ECG:  	  RADIOLOGY:  OTHER: 	    PREVIOUS DIAGNOSTIC TESTING:    [ ] Echocardiogram:   [ ] Catheterization:  [ ] Stress Test:  	
Date of Service: 11-16-22 @ 17:29    Patient is a 77y old  Female who presents with a chief complaint of COVID, weakness (15 Nov 2022 11:37)      Any change in ROS:  doing pretty good:  no sob: no cough :       MEDICATIONS  (STANDING):  albuterol/ipratropium for Nebulization 3 milliLiter(s) Nebulizer every 6 hours  aspirin enteric coated 81 milliGRAM(s) Oral daily  cholecalciferol 1000 Unit(s) Oral daily  dexAMETHasone  Injectable 6 milliGRAM(s) IV Push daily  dextrose 5%. 1000 milliLiter(s) (50 mL/Hr) IV Continuous <Continuous>  dextrose 5%. 1000 milliLiter(s) (100 mL/Hr) IV Continuous <Continuous>  dextrose 50% Injectable 25 Gram(s) IV Push once  dextrose 50% Injectable 12.5 Gram(s) IV Push once  dextrose 50% Injectable 25 Gram(s) IV Push once  glucagon  Injectable 1 milliGRAM(s) IntraMuscular once  guaiFENesin ER 1200 milliGRAM(s) Oral every 12 hours  heparin   Injectable 5000 Unit(s) SubCutaneous every 8 hours  influenza  Vaccine (HIGH DOSE) 0.7 milliLiter(s) IntraMuscular once  insulin glargine Injectable (LANTUS) 5 Unit(s) SubCutaneous at bedtime  insulin lispro (ADMELOG) corrective regimen sliding scale   SubCutaneous three times a day before meals  potassium chloride    Tablet ER 40 milliEquivalent(s) Oral once  remdesivir  IVPB 100 milliGRAM(s) IV Intermittent every 24 hours  remdesivir  IVPB   IV Intermittent   traZODone 150 milliGRAM(s) Oral at bedtime    MEDICATIONS  (PRN):  acetaminophen     Tablet .. 650 milliGRAM(s) Oral every 6 hours PRN Temp greater or equal to 38C (100.4F), Mild Pain (1 - 3)  aluminum hydroxide/magnesium hydroxide/simethicone Suspension 30 milliLiter(s) Oral every 4 hours PRN Dyspepsia  benzocaine 15 mG/menthol 3.6 mG Lozenge 1 Lozenge Oral every 4 hours PRN Sore Throat  benzonatate 100 milliGRAM(s) Oral three times a day PRN Cough  dextrose Oral Gel 15 Gram(s) Oral once PRN Blood Glucose LESS THAN 70 milliGRAM(s)/deciliter  melatonin 3 milliGRAM(s) Oral at bedtime PRN Insomnia  ondansetron Injectable 4 milliGRAM(s) IV Push every 8 hours PRN Nausea and/or Vomiting    Vital Signs Last 24 Hrs  T(C): 36.8 (16 Nov 2022 13:12), Max: 38.6 (15 Nov 2022 22:39)  T(F): 98.2 (16 Nov 2022 13:12), Max: 101.5 (15 Nov 2022 22:39)  HR: 99 (16 Nov 2022 13:12) (90 - 124)  BP: 134/74 (16 Nov 2022 13:12) (124/70 - 159/115)  BP(mean): --  RR: 20 (16 Nov 2022 13:12) (20 - 23)  SpO2: 97% (16 Nov 2022 13:12) (97% - 100%)    Parameters below as of 16 Nov 2022 13:12  Patient On (Oxygen Delivery Method): room air        I&O's Summary        Physical Exam:   GENERAL: NAD, well-groomed, well-developed  HEENT: TABITHA/   Atraumatic, Normocephalic  ENMT: No tonsillar erythema, exudates, or enlargement; Moist mucous membranes, Good dentition, No lesions  NECK: Supple, No JVD, Normal thyroid  CHEST/LUNG: Clear to auscultaion  CVS: Regular rate and rhythm; No murmurs, rubs, or gallops  GI: : Soft, Nontender, Nondistended; Bowel sounds present  NERVOUS SYSTEM:  Alert & Oriented X3  EXTREMITIES:  2+ Peripheral Pulses, No clubbing, cyanosis, or edema  LYMPH: No lymphadenopathy noted  SKIN: No rashes or lesions  ENDOCRINOLOGY: No Thyromegaly  PSYCH: Appropriate    Labs:                              10.5   11.56 )-----------( 265      ( 16 Nov 2022 06:17 )             32.9                         10.5   11.46 )-----------( 251      ( 15 Nov 2022 06:48 )             34.5                         10.4   10.47 )-----------( 243      ( 14 Nov 2022 11:37 )             32.1                         10.9   12.03 )-----------( 280      ( 14 Nov 2022 00:35 )             34.7     11-16    137  |  101  |  24<H>  ----------------------------<  191<H>  3.6   |  20<L>  |  1.15  11-15    140  |  105  |  20  ----------------------------<  165<H>  3.4<L>   |  19<L>  |  1.10  11-15    x   |  x   |  x   ----------------------------<  x   x    |  x   |  1.09  11-14    x   |  x   |  x   ----------------------------<  x   x    |  x   |  1.17  11-14    133<L>  |  101  |  20  ----------------------------<  311<H>  3.7   |  16<L>  |  1.22  11-14    135  |  100  |  20  ----------------------------<  146<H>  3.9   |  22  |  1.37<H>    Ca    9.2      16 Nov 2022 06:17  Ca    9.1      15 Nov 2022 06:48    TPro  6.9  /  Alb  3.5  /  TBili  0.5  /  DBili  x   /  AST  69<H>  /  ALT  35<H>  /  AlkPhos  90  11-16  TPro  7.6  /  Alb  3.7  /  TBili  0.2  /  DBili  x   /  AST  54<H>  /  ALT  28  /  AlkPhos  97  11-15  TPro  6.6  /  Alb  3.5  /  TBili  0.2  /  DBili  <0.2  /  AST  43<H>  /  ALT  25  /  AlkPhos  86  11-15  TPro  7.6  /  Alb  3.8  /  TBili  0.3  /  DBili  <0.2  /  AST  44<H>  /  ALT  25  /  AlkPhos  97  11-14  TPro  7.7  /  Alb  4.1  /  TBili  0.3  /  DBili  x   /  AST  27  /  ALT  19  /  AlkPhos  100  11-14    CAPILLARY BLOOD GLUCOSE      POCT Blood Glucose.: 333 mg/dL (16 Nov 2022 13:12)  POCT Blood Glucose.: 242 mg/dL (16 Nov 2022 09:07)  POCT Blood Glucose.: 261 mg/dL (15 Nov 2022 22:00)      LIVER FUNCTIONS - ( 16 Nov 2022 06:17 )  Alb: 3.5 g/dL / Pro: 6.9 g/dL / ALK PHOS: 90 U/L / ALT: 35 U/L / AST: 69 U/L / GGT: x           PT/INR - ( 15 Nov 2022 01:05 )   PT: 14.4 sec;   INR: 1.24 ratio         PTT - ( 15 Nov 2022 01:05 )  PTT:66.4 sec    D-Dimer Assay, Quantitative: 416 ng/mL DDU (11-14 @ 14:24)  Serum Pro-Brain Natriuretic Peptide: 2689 pg/mL (11-14 @ 03:02)        RECENT CULTURES:  11-14 @ 01:13 Clean Catch Clean Catch (Midstream)         r< from: Xray Chest 1 View- PORTABLE-Urgent (Xray Chest 1 View- PORTABLE-Urgent .) (11.14.22 @ 01:17) >    INTERPRETATION:  CLINICAL INFORMATION: Chest pain, cough.    TECHNIQUE: Single portable view of the chest.    COMPARISON: None available..    FINDINGS:    No focal consolidation.  No pneumothorax or large pleural effusion.  Heart size cannot be accurately assessed in this projection.  No acute osseous abnormality.    IMPRESSION:    No focal consolidation.    --- End of Report ---          ANTWAN BHAGAT MD; Resident Radiologist  This document has been electronically signed.  KARAN ROACH MD; Attending Radiologist  This document has been electronically signed. Nov 14 2022  5:42AM    < end of copied text >  < from: CT Chest w/o Cont (02.06.09 @ 02:04) >  atelectasis is noted.Consolidation is seen in the majority of the left   lower lobe with air bronchograms that is likely infectious in etiology.   Nodule seen in the right lung associated with the minor fissure, likely   representing intrafissural lymph node. There is dependent right basilar   atelectasis.    Small nonspecific subcentimeter mediastinal lymph nodes, likely reactive,   are noted. There is no significant hilar or axillary adenopathy.     Heart size is within normal limits. There is no pericardial effusion.    Vascular calcification is noted. There is no evidence of aortic aneurysm.    Spinal degenerative changes are noted.    Please refer to the abdominal and pelvic  CT report for evaluation of the   relevant structures.        IMPRESSION:    Large Left lower lobe consolidation with trace effusion is likely   infectious in etiology.    Mild emphysema.    Findings discussed with Dr. Kumar on 02/06/2009 at 3:10 a.m.  with read   back.          ISAURO HARRIS M.D., RESIDENT RADIOLOGIST    TAQUERIA BISHOP M.D. ATTENDING RADIOLOGIST  This examination was interpreted on:  Feb 6 2009  9:07A.  This document   has been electronically signed. Feb 6 2009 12:34P.    < end of copied text >         >=3 organisms. Probable collection contamination.          RESPIRATORY CULTURES:          Studies  Chest X-RAY  CT SCAN Chest   Venous Dopplers: LE:   CT Abdomen  Others              
Date of Service: 11-24-22 @ 13:06    Patient is a 77y old  Female who presents with a chief complaint of Shortness of breath     (21 Nov 2022 14:47)      Any change in ROS: She is feeling  ok: NO SOB:  NO COUGH:  on room air    MEDICATIONS  (STANDING):  aspirin enteric coated 81 milliGRAM(s) Oral daily  cholecalciferol 1000 Unit(s) Oral daily  dextrose 5%. 1000 milliLiter(s) (100 mL/Hr) IV Continuous <Continuous>  dextrose 5%. 1000 milliLiter(s) (50 mL/Hr) IV Continuous <Continuous>  dextrose 50% Injectable 25 Gram(s) IV Push once  dextrose 50% Injectable 12.5 Gram(s) IV Push once  dextrose 50% Injectable 25 Gram(s) IV Push once  glucagon  Injectable 1 milliGRAM(s) IntraMuscular once  guaiFENesin ER 1200 milliGRAM(s) Oral every 12 hours  heparin   Injectable 5000 Unit(s) SubCutaneous every 8 hours  influenza  Vaccine (HIGH DOSE) 0.7 milliLiter(s) IntraMuscular once  insulin glargine Injectable (LANTUS) 15 Unit(s) SubCutaneous at bedtime  insulin lispro (ADMELOG) corrective regimen sliding scale   SubCutaneous at bedtime  insulin lispro (ADMELOG) corrective regimen sliding scale   SubCutaneous three times a day before meals  insulin lispro Injectable (ADMELOG) 14 Unit(s) SubCutaneous three times a day before meals  petrolatum white Ointment 1 Application(s) Topical daily  potassium chloride    Tablet ER 40 milliEquivalent(s) Oral once  traZODone 150 milliGRAM(s) Oral at bedtime    MEDICATIONS  (PRN):  acetaminophen     Tablet .. 650 milliGRAM(s) Oral every 6 hours PRN Temp greater or equal to 38C (100.4F), Mild Pain (1 - 3)  albuterol    90 MICROgram(s) HFA Inhaler 2 Puff(s) Inhalation every 6 hours PRN Shortness of Breath and/or Wheezing  aluminum hydroxide/magnesium hydroxide/simethicone Suspension 30 milliLiter(s) Oral every 4 hours PRN Dyspepsia  benzocaine 15 mG/menthol 3.6 mG Lozenge 1 Lozenge Oral every 4 hours PRN Sore Throat  benzonatate 100 milliGRAM(s) Oral three times a day PRN Cough  dextrose Oral Gel 15 Gram(s) Oral once PRN Blood Glucose LESS THAN 70 milliGRAM(s)/deciliter  meclizine 12.5 milliGRAM(s) Oral two times a day PRN Dizziness  melatonin 3 milliGRAM(s) Oral at bedtime PRN Insomnia  ondansetron Injectable 4 milliGRAM(s) IV Push every 8 hours PRN Nausea and/or Vomiting  simethicone 80 milliGRAM(s) Chew three times a day PRN Gas    Vital Signs Last 24 Hrs  T(C): 36.6 (24 Nov 2022 10:03), Max: 36.7 (23 Nov 2022 21:50)  T(F): 97.9 (24 Nov 2022 10:03), Max: 98 (23 Nov 2022 21:50)  HR: 91 (24 Nov 2022 10:03) (74 - 92)  BP: 144/71 (24 Nov 2022 10:03) (110/93 - 144/71)  BP(mean): --  RR: 18 (24 Nov 2022 10:03) (17 - 18)  SpO2: 98% (24 Nov 2022 10:03) (95% - 99%)    Parameters below as of 24 Nov 2022 10:03  Patient On (Oxygen Delivery Method): room air        I&O's Summary    23 Nov 2022 07:01  -  24 Nov 2022 07:00  --------------------------------------------------------  IN: 0 mL / OUT: 1700 mL / NET: -1700 mL          Physical Exam:   GENERAL: NAD, well-groomed, well-developed  HEENT: TABITHA/   Atraumatic, Normocephalic  ENMT: No tonsillar erythema, exudates, or enlargement; Moist mucous membranes, Good dentition, No lesions  NECK: Supple, No JVD, Normal thyroid  CHEST/LUNG: Clear to auscultaion  CVS: Regular rate and rhythm; No murmurs, rubs, or gallops  GI: : Soft, Nontender, Nondistended; Bowel sounds present  NERVOUS SYSTEM:  Alert & Oriented X3  EXTREMITIES: - edema  LYMPH: No lymphadenopathy noted  SKIN: No rashes or lesions  ENDOCRINOLOGY: No Thyromegaly  PSYCH: Appropriate    Labs:                              9.8    15.39 )-----------( 393      ( 24 Nov 2022 07:43 )             31.2                         10.3   14.34 )-----------( 428      ( 23 Nov 2022 04:53 )             32.6                         9.9    14.34 )-----------( 429      ( 22 Nov 2022 04:31 )             31.0                         10.3   15.59 )-----------( 445      ( 21 Nov 2022 05:30 )             32.6     11-24    134<L>  |  101  |  37<H>  ----------------------------<  200<H>  4.5   |  19<L>  |  1.27  11-23    134<L>  |  102  |  39<H>  ----------------------------<  235<H>  4.7   |  21<L>  |  1.22  11-22    135  |  101  |  41<H>  ----------------------------<  237<H>  4.6   |  20<L>  |  1.31<H>  11-21    135  |  103  |  45<H>  ----------------------------<  224<H>  5.0   |  21<L>  |  1.42<H>    Ca    9.4      24 Nov 2022 07:43  Ca    9.3      23 Nov 2022 04:53  Phos  3.6     11-24  Phos  3.3     11-23  Mg     2.00     11-24  Mg     1.50     11-23    TPro  6.7  /  Alb  3.4  /  TBili  0.2  /  DBili  <0.2  /  AST  16  /  ALT  23  /  AlkPhos  77  11-23  TPro  6.6  /  Alb  3.4  /  TBili  0.2  /  DBili  <0.2  /  AST  15  /  ALT  23  /  AlkPhos  78  11-22  TPro  6.8  /  Alb  3.4  /  TBili  0.2  /  DBili  <0.2  /  AST  16  /  ALT  28  /  AlkPhos  84  11-21    CAPILLARY BLOOD GLUCOSE      POCT Blood Glucose.: 442 mg/dL (24 Nov 2022 12:21)  POCT Blood Glucose.: 428 mg/dL (24 Nov 2022 12:20)  POCT Blood Glucose.: 309 mg/dL (24 Nov 2022 09:36)  POCT Blood Glucose.: 407 mg/dL (23 Nov 2022 21:52)  POCT Blood Glucose.: 331 mg/dL (23 Nov 2022 17:47)  POCT Blood Glucose.: 390 mg/dL (23 Nov 2022 13:12)      LIVER FUNCTIONS - ( 23 Nov 2022 04:53 )  Alb: 3.4 g/dL / Pro: 6.7 g/dL / ALK PHOS: 77 U/L / ALT: 23 U/L / AST: 16 U/L / GGT: x               D-Dimer Assay, Quantitative: 332 ng/mL DDU (11-23 @ 04:53)  D-Dimer Assay, Quantitative: 352 ng/mL DDU (11-20 @ 13:40)  D-Dimer Assay, Quantitative: 410 ng/mL DDU (11-18 @ 06:49)        RECENT CULTURES:        RESPIRATORY CULTURES:  rad< from: Xray Chest 1 View- PORTABLE-Urgent (Xray Chest 1 View- PORTABLE-Urgent .) (11.14.22 @ 01:17) >  INTERPRETATION:  CLINICAL INFORMATION: Chest pain, cough.    TECHNIQUE: Single portable view of the chest.    COMPARISON: None available..    FINDINGS:    No focal consolidation.  No pneumothorax or large pleural effusion.  Heart size cannot be accurately assessed in this projection.  No acute osseous abnormality.    IMPRESSION:    No focal consolidation.    --- End of Report ---          ANTWAN BHAGAT MD; Resident Radiologist  This document has been electronically signed.  KARAN ROACH MD; Attending Radiologist  This document has been electronically signed. Nov 14 2022  5:42AM    < end of copied text >          Studies  Chest X-RAY  CT SCAN Chest   Venous Dopplers: LE:   CT Abdomen  Others              
House Cardiology Progress Note    Interval Events:  Feels well      MEDICATIONS:  aspirin enteric coated 81 milliGRAM(s) Oral daily  heparin   Injectable 5000 Unit(s) SubCutaneous every 8 hours  albuterol    90 MICROgram(s) HFA Inhaler 2 Puff(s) Inhalation every 6 hours PRN  benzonatate 100 milliGRAM(s) Oral three times a day PRN  guaiFENesin ER 1200 milliGRAM(s) Oral every 12 hours  acetaminophen     Tablet .. 650 milliGRAM(s) Oral every 6 hours PRN  meclizine 25 milliGRAM(s) Oral once  melatonin 3 milliGRAM(s) Oral at bedtime PRN  ondansetron Injectable 4 milliGRAM(s) IV Push every 8 hours PRN  traZODone 150 milliGRAM(s) Oral at bedtime    aluminum hydroxide/magnesium hydroxide/simethicone Suspension 30 milliLiter(s) Oral every 4 hours PRN  dexAMETHasone  Injectable 6 milliGRAM(s) IV Push daily  dextrose 50% Injectable 25 Gram(s) IV Push once  dextrose 50% Injectable 12.5 Gram(s) IV Push once  dextrose 50% Injectable 25 Gram(s) IV Push once  dextrose Oral Gel 15 Gram(s) Oral once PRN  glucagon  Injectable 1 milliGRAM(s) IntraMuscular once  insulin glargine Injectable (LANTUS) 8 Unit(s) SubCutaneous at bedtime  insulin lispro (ADMELOG) corrective regimen sliding scale   SubCutaneous three times a day before meals  insulin lispro Injectable (ADMELOG) 5 Unit(s) SubCutaneous three times a day before meals  benzocaine 15 mG/menthol 3.6 mG Lozenge 1 Lozenge Oral every 4 hours PRN  cholecalciferol 1000 Unit(s) Oral daily  dextrose 5%. 1000 milliLiter(s) IV Continuous <Continuous>  dextrose 5%. 1000 milliLiter(s) IV Continuous <Continuous>  influenza  Vaccine (HIGH DOSE) 0.7 milliLiter(s) IntraMuscular once  petrolatum white Ointment 1 Application(s) Topical daily  potassium chloride    Tablet ER 40 milliEquivalent(s) Oral once    PHYSICAL EXAM:  T(C): 36.7 (11-18-22 @ 06:00), Max: 36.7 (11-17-22 @ 13:17)  HR: 81 (11-18-22 @ 06:00) (81 - 92)  BP: 142/61 (11-18-22 @ 06:00) (116/62 - 150/74)  RR: 18 (11-18-22 @ 06:00) (18 - 20)  SpO2: 100% (11-18-22 @ 06:00) (98% - 100%)  Wt(kg): --  I&O's Summary    Appearance: No acute distress  HEENT:   mmm  Cardiovascular: Normal S1 S2, tachycardic, no elevated JVP, no edema  Respiratory: Lungs clear to auscultation	, good air movement  Psychiatry: A & O x 3, Mood & affect appropriate  Gastrointestinal:  soft nt nd normoactive bs	  Skin: No rashes, no ecchymoses, no cyanosis	  Neurologic: grossly non-focal  Extremities: Normal range of motion, no clubbing, cyanosis or edema  Vascular: Peripheral pulses palpable bilaterally      LABS:	 	  CBC Full  -  ( 18 Nov 2022 06:49 )  WBC Count : 11.02 K/uL  Hemoglobin : 10.7 g/dL  Hematocrit : 33.5 %  Platelet Count - Automated : 345 K/uL    11-18  136  |  105  |  40<H>  ----------------------------<  206<H>  4.4   |  18<L>  |  1.11    11-17  136  |  102  |  34<H>  ----------------------------<  202<H>  4.0   |  21<L>  |  1.22    Ca    9.5      18 Nov 2022 06:49  Ca    9.2      17 Nov 2022 06:43  Phos  3.0     11-18  Mg     1.50     11-18    TPro  6.7  /  Alb  3.2<L>  /  TBili  0.3  /  DBili  <0.2  /  AST  29  /  ALT  32  /  AlkPhos  83  11-18  TPro  6.6  /  Alb  3.2<L>  /  TBili  0.4  /  DBili  <0.2  /  AST  47<H>  /  ALT  35<H>  /  AlkPhos  82  11-17  
[facility], Division of Infectious Diseases  SHOSHANA Chang Y. Patel, S. Shah, G. Mcihael  [phone]  ([phone] - weekdays after 5pm and weekends)    Name: CECELIA TOPETE  Age/Gender: 77y Female  MRN: [id number]    Interval History:  Patient seen this morning.   Notes reviewed.   No concerning overnight events.  Afebrile.   states she feels well aside from LE weakness  reports continued vertigo    Allergies: HMG-CoA reductase inhibitors (Unknown)      Objective:  Vitals:   T(F): 97.8 (11-20-22 @ 05:00), Max: 98 (11-19-22 @ 20:00)  HR: 68 (11-20-22 @ 05:00) (62 - 83)  BP: 136/57 (11-20-22 @ 05:00) (114/56 - 136/57)  RR: 18 (11-20-22 @ 05:00) (17 - 18)  SpO2: 99% (11-20-22 @ 05:00) (97% - 100%)  Physical Examination:  General: no acute distress  HEENT: anicteric  Cardio: normal rate  Resp: breathing comfortably on RA  Abd: soft, NT, ND  Ext: no LE edema  Skin: warm, dry    Laboratory Studies:  CBC:                       10.8   13.72 )-----------( 440      ( 20 Nov 2022 05:40 )             33.4     WBC Trend:  13.72 11-20-22 @ 05:40  11.33 11-19-22 @ 05:13  11.02 11-18-22 @ 06:49  9.08 11-17-22 @ 06:43  11.56 11-16-22 @ 06:17  11.46 11-15-22 @ 06:48  10.47 11-14-22 @ 11:37  12.03 11-14-22 @ 00:35    CMP: 11-20    138  |  104  |  41<H>  ----------------------------<  177<H>  4.7   |  22  |  1.26    Ca    9.5      20 Nov 2022 05:40  Mg     1.40     11-20    TPro  7.1  /  Alb  3.3  /  TBili  0.3  /  DBili  <0.2  /  AST  23  /  ALT  31  /  AlkPhos  83  11-20      LIVER FUNCTIONS - ( 20 Nov 2022 05:40 )  Alb: 3.3 g/dL / Pro: 7.1 g/dL / ALK PHOS: 83 U/L / ALT: 31 U/L / AST: 23 U/L / GGT: x               Microbiology: reviewed     Culture - Blood (collected 11-16-22 @ 09:40)  Source: .Blood Blood-Peripheral  Preliminary Report (11-17-22 @ 13:02):    No growth to date.    Culture - Blood (collected 11-16-22 @ 09:20)  Source: .Blood Blood-Peripheral  Preliminary Report (11-17-22 @ 16:01):    No growth to date.    Culture - Urine (collected 11-14-22 @ 01:13)  Source: Clean Catch Clean Catch (Midstream)  Final Report (11-15-22 @ 08:36):    >=3 organisms. Probable collection contamination.        Radiology: reviewed     Medications:  acetaminophen     Tablet .. 650 milliGRAM(s) Oral every 6 hours PRN  albuterol    90 MICROgram(s) HFA Inhaler 2 Puff(s) Inhalation every 6 hours PRN  aluminum hydroxide/magnesium hydroxide/simethicone Suspension 30 milliLiter(s) Oral every 4 hours PRN  aspirin enteric coated 81 milliGRAM(s) Oral daily  benzocaine 15 mG/menthol 3.6 mG Lozenge 1 Lozenge Oral every 4 hours PRN  benzonatate 100 milliGRAM(s) Oral three times a day PRN  cholecalciferol 1000 Unit(s) Oral daily  dexAMETHasone  Injectable 6 milliGRAM(s) IV Push daily  dextrose 5%. 1000 milliLiter(s) IV Continuous <Continuous>  dextrose 5%. 1000 milliLiter(s) IV Continuous <Continuous>  dextrose 50% Injectable 25 Gram(s) IV Push once  dextrose 50% Injectable 12.5 Gram(s) IV Push once  dextrose 50% Injectable 25 Gram(s) IV Push once  dextrose Oral Gel 15 Gram(s) Oral once PRN  glucagon  Injectable 1 milliGRAM(s) IntraMuscular once  guaiFENesin ER 1200 milliGRAM(s) Oral every 12 hours  heparin   Injectable 5000 Unit(s) SubCutaneous every 8 hours  influenza  Vaccine (HIGH DOSE) 0.7 milliLiter(s) IntraMuscular once  insulin glargine Injectable (LANTUS) 8 Unit(s) SubCutaneous at bedtime  insulin lispro (ADMELOG) corrective regimen sliding scale   SubCutaneous at bedtime  insulin lispro (ADMELOG) corrective regimen sliding scale   SubCutaneous three times a day before meals  insulin lispro Injectable (ADMELOG) 5 Unit(s) SubCutaneous three times a day before meals  melatonin 3 milliGRAM(s) Oral at bedtime PRN  ondansetron Injectable 4 milliGRAM(s) IV Push every 8 hours PRN  petrolatum white Ointment 1 Application(s) Topical daily  potassium chloride    Tablet ER 40 milliEquivalent(s) Oral once  traZODone 150 milliGRAM(s) Oral at bedtime    Antimicrobials:  
[facility], Division of Infectious Diseases  SHOSHANA Chang Y. Patel, S. Shah, G. Michael  [phone]  ([phone] - weekdays after 5pm and weekends)    Name: CECELIA TOPETE  Age/Gender: 77y Female  MRN: [id number]    Interval History:  Patient seen this morning.   Notes reviewed.   No concerning overnight events.  Afebrile.   denies SOB, states dizziness when sitting in has resolved  continues to have vertigo when moving but feels it is slowly improving    Allergies: HMG-CoA reductase inhibitors (Unknown)      Objective:  Vitals:   T(F): 97.7 (11-25-22 @ 05:30), Max: 98 (11-24-22 @ 21:30)  HR: 73 (11-25-22 @ 05:30) (69 - 91)  BP: 143/76 (11-25-22 @ 05:30) (113/69 - 144/71)  RR: 18 (11-25-22 @ 05:30) (17 - 18)  SpO2: 96% (11-25-22 @ 05:30) (96% - 99%)  Physical Examination:  General: no acute distress  HEENT: anicteric  Cardio: normal rate  Resp: clear to auscultation bilaterally  Abd: soft, NT, ND  Ext: no LE edema  Skin: warm, dry    Laboratory Studies:  CBC:                       10.0   16.70 )-----------( 363      ( 25 Nov 2022 06:32 )             31.7     WBC Trend:  16.70 11-25-22 @ 06:32  15.39 11-24-22 @ 07:43  14.34 11-23-22 @ 04:53  14.34 11-22-22 @ 04:31  15.59 11-21-22 @ 05:30  13.72 11-20-22 @ 05:40  11.33 11-19-22 @ 05:13    CMP: 11-25    136  |  104  |  37<H>  ----------------------------<  187<H>  4.2   |  22  |  1.34<H>    Ca    9.6      25 Nov 2022 06:32  Phos  3.6     11-25  Mg     1.50     11-25              Microbiology: reviewed     Culture - Blood (collected 11-16-22 @ 09:40)  Source: .Blood Blood-Peripheral  Final Report (11-21-22 @ 13:00):    No Growth Final    Culture - Blood (collected 11-16-22 @ 09:20)  Source: .Blood Blood-Peripheral  Final Report (11-21-22 @ 16:00):    No Growth Final    Culture - Urine (collected 11-14-22 @ 01:13)  Source: Clean Catch Clean Catch (Midstream)  Final Report (11-15-22 @ 08:36):    >=3 organisms. Probable collection contamination.        Radiology: reviewed     Medications:  acetaminophen     Tablet .. 650 milliGRAM(s) Oral every 6 hours PRN  albuterol    90 MICROgram(s) HFA Inhaler 2 Puff(s) Inhalation every 6 hours PRN  aluminum hydroxide/magnesium hydroxide/simethicone Suspension 30 milliLiter(s) Oral every 4 hours PRN  aspirin enteric coated 81 milliGRAM(s) Oral daily  benzocaine 15 mG/menthol 3.6 mG Lozenge 1 Lozenge Oral every 4 hours PRN  benzonatate 100 milliGRAM(s) Oral three times a day PRN  cholecalciferol 1000 Unit(s) Oral daily  dextrose 5%. 1000 milliLiter(s) IV Continuous <Continuous>  dextrose 5%. 1000 milliLiter(s) IV Continuous <Continuous>  dextrose 50% Injectable 25 Gram(s) IV Push once  dextrose 50% Injectable 12.5 Gram(s) IV Push once  dextrose 50% Injectable 25 Gram(s) IV Push once  dextrose Oral Gel 15 Gram(s) Oral once PRN  glucagon  Injectable 1 milliGRAM(s) IntraMuscular once  guaiFENesin ER 1200 milliGRAM(s) Oral every 12 hours  heparin   Injectable 5000 Unit(s) SubCutaneous every 8 hours  influenza  Vaccine (HIGH DOSE) 0.7 milliLiter(s) IntraMuscular once  insulin glargine Injectable (LANTUS) 18 Unit(s) SubCutaneous at bedtime  insulin lispro (ADMELOG) corrective regimen sliding scale   SubCutaneous three times a day before meals  insulin lispro (ADMELOG) corrective regimen sliding scale   SubCutaneous at bedtime  insulin lispro Injectable (ADMELOG) 14 Unit(s) SubCutaneous three times a day before meals  magnesium sulfate  IVPB 2 Gram(s) IV Intermittent once  meclizine 25 milliGRAM(s) Oral two times a day PRN  melatonin 3 milliGRAM(s) Oral at bedtime PRN  ondansetron Injectable 4 milliGRAM(s) IV Push every 8 hours PRN  petrolatum white Ointment 1 Application(s) Topical daily  potassium chloride    Tablet ER 40 milliEquivalent(s) Oral once  simethicone 80 milliGRAM(s) Chew three times a day PRN  traZODone 150 milliGRAM(s) Oral at bedtime    Antimicrobials:  
[facility], Division of Infectious Diseases  SHOSHANA Chang Y. Patel, S. Shah, G. Michael  [phone]  ([phone] - weekdays after 5pm and weekends)    Name: CECELIA TOPETE  Age/Gender: 77y Female  MRN: [id number]    Interval History:  Patient seen this morning.   Notes reviewed.   No concerning overnight events.  Afebrile.   states her bed is not functioning well  reports sore throat which began yesterday and is which is making it difficult to eat today    Allergies: HMG-CoA reductase inhibitors (Unknown)      Objective:  Vitals:   T(F): 98.3 (11-15-22 @ 09:03), Max: 99.1 (22 @ 15:40)  HR: 121 (11-15-22 @ 09:03) (95 - 121)  BP: 125/64 (11-15-22 @ 09:03) (120/60 - 140/59)  RR: 20 (11-15-22 @ 09:03) (18 - 22)  SpO2: 99% (11-15-22 @ 09:03) (99% - 100%)  Physical Examination:  General: no acute distress  HEENT: anicteric  Cardio: S1, S2, tachycardic  Resp: distant breath sounds  Abd: soft, NT, ND  Ext: no LE edema  Skin: warm, dry    Laboratory Studies:  CBC:                       10.5   11.46 )-----------( 251      ( 15 Nov 2022 06:48 )             34.5     WBC Trend:  11.46 11-15-22 @ 06:48  10.47 22 @ 11:37  12.03 22 @ 00:35    CMP: 15    140  |  105  |  20  ----------------------------<  165<H>  3.4<L>   |  19<L>  |  1.10    Ca    9.1      15 Nov 2022 06:48  Phos  3.0       Mg     1.60         TPro  7.6  /  Alb  3.7  /  TBili  0.2  /  DBili  x   /  AST  54<H>  /  ALT  28  /  AlkPhos  97  15      LIVER FUNCTIONS - ( 15 Nov 2022 06:48 )  Alb: 3.7 g/dL / Pro: 7.6 g/dL / ALK PHOS: 97 U/L / ALT: 28 U/L / AST: 54 U/L / GGT: x             Urinalysis Basic - ( 2022 01:10 )    Color: Yellow / Appearance: Turbid / S.018 / pH: x  Gluc: x / Ketone: Negative  / Bili: Negative / Urobili: <2 mg/dL   Blood: x / Protein: 30 mg/dL / Nitrite: Negative   Leuk Esterase: Large / RBC: 10 /HPF / WBC 30 /HPF   Sq Epi: x / Non Sq Epi: 12 /HPF / Bacteria: Few      Microbiology: reviewed     Culture - Urine (collected 22 @ 01:13)  Source: Clean Catch Clean Catch (Midstream)  Final Report (11-15-22 @ 08:36):    >=3 organisms. Probable collection contamination.        Radiology: reviewed     Medications:  acetaminophen     Tablet .. 650 milliGRAM(s) Oral every 6 hours PRN  albuterol/ipratropium for Nebulization 3 milliLiter(s) Nebulizer every 6 hours  aluminum hydroxide/magnesium hydroxide/simethicone Suspension 30 milliLiter(s) Oral every 4 hours PRN  aspirin enteric coated 81 milliGRAM(s) Oral daily  cholecalciferol 1000 Unit(s) Oral daily  dexAMETHasone  Injectable 6 milliGRAM(s) IV Push daily  dextrose 5%. 1000 milliLiter(s) IV Continuous <Continuous>  dextrose 5%. 1000 milliLiter(s) IV Continuous <Continuous>  dextrose 50% Injectable 25 Gram(s) IV Push once  dextrose 50% Injectable 12.5 Gram(s) IV Push once  dextrose 50% Injectable 25 Gram(s) IV Push once  dextrose Oral Gel 15 Gram(s) Oral once PRN  glucagon  Injectable 1 milliGRAM(s) IntraMuscular once  guaiFENesin ER 1200 milliGRAM(s) Oral every 12 hours  influenza  Vaccine (HIGH DOSE) 0.7 milliLiter(s) IntraMuscular once  insulin glargine Injectable (LANTUS) 5 Unit(s) SubCutaneous at bedtime  insulin lispro (ADMELOG) corrective regimen sliding scale   SubCutaneous three times a day before meals  melatonin 3 milliGRAM(s) Oral at bedtime PRN  ondansetron Injectable 4 milliGRAM(s) IV Push every 8 hours PRN  potassium chloride    Tablet ER 40 milliEquivalent(s) Oral once  remdesivir  IVPB 100 milliGRAM(s) IV Intermittent every 24 hours  remdesivir  IVPB   IV Intermittent   traZODone 150 milliGRAM(s) Oral at bedtime    Antimicrobials:  remdesivir  IVPB 100 milliGRAM(s) IV Intermittent every 24 hours  remdesivir  IVPB   IV Intermittent   
[facility], Division of Infectious Diseases  SHOSHANA Chang Y. Patel, S. Shah, G. Michael  [phone]  ([phone] - weekdays after 5pm and weekends)    Name: CECELIA TOPETE  Age/Gender: 77y Female  MRN: [id number]    Interval History:  Patient seen this morning.   Notes reviewed.   Afebrile.   reports sore throat resolved  worked w/ PT yesterday    Allergies: HMG-CoA reductase inhibitors (Unknown)      Objective:  Vitals:   T(F): 98 (11-18-22 @ 06:00), Max: 98 (11-17-22 @ 11:31)  HR: 81 (11-18-22 @ 06:00) (81 - 99)  BP: 142/61 (11-18-22 @ 06:00) (116/62 - 150/74)  RR: 18 (11-18-22 @ 06:00) (18 - 20)  SpO2: 100% (11-18-22 @ 06:00) (95% - 100%)  Physical Examination:  General: no acute distress  HEENT: anicteric  Cardio: S1, S2, normal rate  Resp: clear to auscultation b/l  Abd: soft, NT, ND  Ext: no LE edema  Skin: warm, dry    Laboratory Studies:  CBC:                       10.7   11.02 )-----------( 345      ( 18 Nov 2022 06:49 )             33.5     WBC Trend:  11.02 11-18-22 @ 06:49  9.08 11-17-22 @ 06:43  11.56 11-16-22 @ 06:17  11.46 11-15-22 @ 06:48  10.47 11-14-22 @ 11:37  12.03 11-14-22 @ 00:35    CMP: 11-18    136  |  105  |  40<H>  ----------------------------<  206<H>  4.4   |  18<L>  |  1.11    Ca    9.5      18 Nov 2022 06:49  Phos  3.0     11-18  Mg     1.50     11-18    TPro  6.7  /  Alb  3.2<L>  /  TBili  0.3  /  DBili  <0.2  /  AST  29  /  ALT  32  /  AlkPhos  83  11-18      LIVER FUNCTIONS - ( 18 Nov 2022 06:49 )  Alb: 3.2 g/dL / Pro: 6.7 g/dL / ALK PHOS: 83 U/L / ALT: 32 U/L / AST: 29 U/L / GGT: x               Microbiology: reviewed     Culture - Blood (collected 11-16-22 @ 09:40)  Source: .Blood Blood-Peripheral  Preliminary Report (11-17-22 @ 13:02):    No growth to date.    Culture - Blood (collected 11-16-22 @ 09:20)  Source: .Blood Blood-Peripheral  Preliminary Report (11-17-22 @ 16:01):    No growth to date.    Culture - Urine (collected 11-14-22 @ 01:13)  Source: Clean Catch Clean Catch (Midstream)  Final Report (11-15-22 @ 08:36):    >=3 organisms. Probable collection contamination.        Radiology: reviewed     Medications:  acetaminophen     Tablet .. 650 milliGRAM(s) Oral every 6 hours PRN  albuterol    90 MICROgram(s) HFA Inhaler 2 Puff(s) Inhalation every 6 hours PRN  aluminum hydroxide/magnesium hydroxide/simethicone Suspension 30 milliLiter(s) Oral every 4 hours PRN  aspirin enteric coated 81 milliGRAM(s) Oral daily  benzocaine 15 mG/menthol 3.6 mG Lozenge 1 Lozenge Oral every 4 hours PRN  benzonatate 100 milliGRAM(s) Oral three times a day PRN  cholecalciferol 1000 Unit(s) Oral daily  dexAMETHasone  Injectable 6 milliGRAM(s) IV Push daily  dextrose 5%. 1000 milliLiter(s) IV Continuous <Continuous>  dextrose 5%. 1000 milliLiter(s) IV Continuous <Continuous>  dextrose 50% Injectable 25 Gram(s) IV Push once  dextrose 50% Injectable 12.5 Gram(s) IV Push once  dextrose 50% Injectable 25 Gram(s) IV Push once  dextrose Oral Gel 15 Gram(s) Oral once PRN  glucagon  Injectable 1 milliGRAM(s) IntraMuscular once  guaiFENesin ER 1200 milliGRAM(s) Oral every 12 hours  heparin   Injectable 5000 Unit(s) SubCutaneous every 8 hours  influenza  Vaccine (HIGH DOSE) 0.7 milliLiter(s) IntraMuscular once  insulin glargine Injectable (LANTUS) 8 Unit(s) SubCutaneous at bedtime  insulin lispro (ADMELOG) corrective regimen sliding scale   SubCutaneous three times a day before meals  insulin lispro Injectable (ADMELOG) 5 Unit(s) SubCutaneous three times a day before meals  magnesium oxide 400 milliGRAM(s) Oral once  melatonin 3 milliGRAM(s) Oral at bedtime PRN  ondansetron Injectable 4 milliGRAM(s) IV Push every 8 hours PRN  petrolatum white Ointment 1 Application(s) Topical daily  potassium chloride    Tablet ER 40 milliEquivalent(s) Oral once  remdesivir  IVPB 100 milliGRAM(s) IV Intermittent every 24 hours  remdesivir  IVPB   IV Intermittent   traZODone 150 milliGRAM(s) Oral at bedtime    Antimicrobials:  remdesivir  IVPB 100 milliGRAM(s) IV Intermittent every 24 hours  remdesivir  IVPB   IV Intermittent   
[facility], Division of Infectious Diseases  SHOSHANA Chang Y. Patel, S. Shah, G. Michael  [phone]  ([phone] - weekdays after 5pm and weekends)    Name: CECELIA TOPETE  Age/Gender: 77y Female  MRN: [id number]    Interval History:  Patient seen this morning.   Notes reviewed.   Afebrile.   endorses SOB when speaking or moving short distances  feels vertigo is slowly improving    Allergies: HMG-CoA reductase inhibitors (Unknown)      Objective:  Vitals:   T(F): 98.3 (11-21-22 @ 05:54), Max: 98.6 (11-20-22 @ 12:45)  HR: 72 (11-21-22 @ 05:54) (72 - 93)  BP: 139/63 (11-21-22 @ 05:54) (114/66 - 139/63)  RR: 17 (11-21-22 @ 05:54) (17 - 18)  SpO2: 99% (11-21-22 @ 05:54) (96% - 99%)  Physical Examination:  General: no acute distress  HEENT: anicteric  Cardio: S1, S2, normal rate  Resp: clear to auscultation bilaterally  Abd: soft, NT, ND  Ext: no LE edema  Skin: warm, dry    Laboratory Studies:  CBC:                       10.3   15.59 )-----------( 445      ( 21 Nov 2022 05:30 )             32.6     WBC Trend:  15.59 11-21-22 @ 05:30  13.72 11-20-22 @ 05:40  11.33 11-19-22 @ 05:13  11.02 11-18-22 @ 06:49  9.08 11-17-22 @ 06:43  11.56 11-16-22 @ 06:17  11.46 11-15-22 @ 06:48  10.47 11-14-22 @ 11:37    CMP: 11-21    135  |  103  |  45<H>  ----------------------------<  224<H>  5.0   |  21<L>  |  1.42<H>    Ca    9.6      21 Nov 2022 05:30  Mg     1.50     11-21    TPro  6.8  /  Alb  3.4  /  TBili  0.2  /  DBili  <0.2  /  AST  16  /  ALT  28  /  AlkPhos  84  11-21      LIVER FUNCTIONS - ( 21 Nov 2022 05:30 )  Alb: 3.4 g/dL / Pro: 6.8 g/dL / ALK PHOS: 84 U/L / ALT: 28 U/L / AST: 16 U/L / GGT: x               Microbiology: reviewed     Culture - Blood (collected 11-16-22 @ 09:40)  Source: .Blood Blood-Peripheral  Preliminary Report (11-17-22 @ 13:02):    No growth to date.    Culture - Blood (collected 11-16-22 @ 09:20)  Source: .Blood Blood-Peripheral  Preliminary Report (11-17-22 @ 16:01):    No growth to date.    Culture - Urine (collected 11-14-22 @ 01:13)  Source: Clean Catch Clean Catch (Midstream)  Final Report (11-15-22 @ 08:36):    >=3 organisms. Probable collection contamination.        Radiology: reviewed     Medications:  acetaminophen     Tablet .. 650 milliGRAM(s) Oral every 6 hours PRN  albuterol    90 MICROgram(s) HFA Inhaler 2 Puff(s) Inhalation every 6 hours PRN  aluminum hydroxide/magnesium hydroxide/simethicone Suspension 30 milliLiter(s) Oral every 4 hours PRN  aspirin enteric coated 81 milliGRAM(s) Oral daily  benzocaine 15 mG/menthol 3.6 mG Lozenge 1 Lozenge Oral every 4 hours PRN  benzonatate 100 milliGRAM(s) Oral three times a day PRN  cholecalciferol 1000 Unit(s) Oral daily  dexAMETHasone  Injectable 6 milliGRAM(s) IV Push daily  dextrose 5%. 1000 milliLiter(s) IV Continuous <Continuous>  dextrose 5%. 1000 milliLiter(s) IV Continuous <Continuous>  dextrose 50% Injectable 25 Gram(s) IV Push once  dextrose 50% Injectable 12.5 Gram(s) IV Push once  dextrose 50% Injectable 25 Gram(s) IV Push once  dextrose Oral Gel 15 Gram(s) Oral once PRN  glucagon  Injectable 1 milliGRAM(s) IntraMuscular once  guaiFENesin ER 1200 milliGRAM(s) Oral every 12 hours  heparin   Injectable 5000 Unit(s) SubCutaneous every 8 hours  influenza  Vaccine (HIGH DOSE) 0.7 milliLiter(s) IntraMuscular once  insulin glargine Injectable (LANTUS) 12 Unit(s) SubCutaneous at bedtime  insulin lispro (ADMELOG) corrective regimen sliding scale   SubCutaneous three times a day before meals  insulin lispro (ADMELOG) corrective regimen sliding scale   SubCutaneous at bedtime  insulin lispro Injectable (ADMELOG) 8 Unit(s) SubCutaneous three times a day before meals  melatonin 3 milliGRAM(s) Oral at bedtime PRN  ondansetron Injectable 4 milliGRAM(s) IV Push every 8 hours PRN  petrolatum white Ointment 1 Application(s) Topical daily  potassium chloride    Tablet ER 40 milliEquivalent(s) Oral once  traZODone 150 milliGRAM(s) Oral at bedtime    Antimicrobials:  
[facility]      Seen earlier today Date of service   No new neurological symptoms reported. Remains stable neurologically.   - Today, patient is without complaints.          *****MEDICATIONS: Current medication reviewed and documented.    MEDICATIONS  (STANDING):  aspirin enteric coated 81 milliGRAM(s) Oral daily  cholecalciferol 1000 Unit(s) Oral daily  dexAMETHasone  Injectable 6 milliGRAM(s) IV Push daily  dextrose 5%. 1000 milliLiter(s) (50 mL/Hr) IV Continuous <Continuous>  dextrose 5%. 1000 milliLiter(s) (100 mL/Hr) IV Continuous <Continuous>  dextrose 50% Injectable 25 Gram(s) IV Push once  dextrose 50% Injectable 12.5 Gram(s) IV Push once  dextrose 50% Injectable 25 Gram(s) IV Push once  glucagon  Injectable 1 milliGRAM(s) IntraMuscular once  guaiFENesin ER 1200 milliGRAM(s) Oral every 12 hours  heparin   Injectable 5000 Unit(s) SubCutaneous every 8 hours  influenza  Vaccine (HIGH DOSE) 0.7 milliLiter(s) IntraMuscular once  insulin glargine Injectable (LANTUS) 12 Unit(s) SubCutaneous at bedtime  insulin lispro (ADMELOG) corrective regimen sliding scale   SubCutaneous three times a day before meals  insulin lispro (ADMELOG) corrective regimen sliding scale   SubCutaneous at bedtime  insulin lispro Injectable (ADMELOG) 8 Unit(s) SubCutaneous three times a day before meals  lactated ringers. 1000 milliLiter(s) (75 mL/Hr) IV Continuous <Continuous>  petrolatum white Ointment 1 Application(s) Topical daily  potassium chloride    Tablet ER 40 milliEquivalent(s) Oral once  traZODone 150 milliGRAM(s) Oral at bedtime    MEDICATIONS  (PRN):  acetaminophen     Tablet .. 650 milliGRAM(s) Oral every 6 hours PRN Temp greater or equal to 38C (100.4F), Mild Pain (1 - 3)  albuterol    90 MICROgram(s) HFA Inhaler 2 Puff(s) Inhalation every 6 hours PRN Shortness of Breath and/or Wheezing  aluminum hydroxide/magnesium hydroxide/simethicone Suspension 30 milliLiter(s) Oral every 4 hours PRN Dyspepsia  benzocaine 15 mG/menthol 3.6 mG Lozenge 1 Lozenge Oral every 4 hours PRN Sore Throat  benzonatate 100 milliGRAM(s) Oral three times a day PRN Cough  dextrose Oral Gel 15 Gram(s) Oral once PRN Blood Glucose LESS THAN 70 milliGRAM(s)/deciliter  melatonin 3 milliGRAM(s) Oral at bedtime PRN Insomnia  ondansetron Injectable 4 milliGRAM(s) IV Push every 8 hours PRN Nausea and/or Vomiting          ***** VITAL SIGNS:   Vital Signs Last 24 Hrs       T(F): 98.1 (11-21-22 @ 12:00), Max: 98.3 (11-21-22 @ 05:54)  HR: 69 (11-21-22 @ 12:00) (69 - 79)  BP: 108/68 (11-21-22 @ 12:00) (108/68 - 139/63)  RR: 18 (11-21-22 @ 12:00) (17 - 18)  SpO2: 98% (11-21-22 @ 12:00) (96% - 99%)  Wt(kg): --  I&O's Summary    20 Nov 2022 07:01  -  21 Nov 2022 07:00  --------------------------------------------------------  IN: 0 mL / OUT: 0 mL / NET: 0 mL             *****PHYSICAL EXAM:   alert oriented x 3 attention comprehension are fair.  Able to name, repeat.   EOmi fundi not visualized   no nystagmus VFF to confrontation  Tongue is midline  Palate elevates symmetrically   Moving all 4 ext spontaneously no drift appreciated    Gait not assessed.            *****LAB AND IMAGING:                        10.3   15.59 )-----------( 445      ( 21 Nov 2022 05:30 )             32.6               11-21    135  |  103  |  45<H>  ----------------------------<  224<H>  5.0   |  21<L>  |  1.42<H>    Ca    9.6      21 Nov 2022 05:30  Mg     1.50     11-21    TPro  6.8  /  Alb  3.4  /  TBili  0.2  /  DBili  <0.2  /  AST  16  /  ALT  28  /  AlkPhos  84  11-21           CARDIAC MARKERS ( 21 Nov 2022 05:30 )  x     / x     / 75 U/L / x     / x                    [All pertinent recent Imaging/Reports reviewed]            *****A S S E S S M E N T   A N D   P L A N :Excerpt from H&P,"   77 year old female, PMH of HTN, HLD, NIDDM, former smoker, quit 15 years ago, vaccinated for covid and boosted in the summer, presents to ER with symptoms of shortness of breath, cough, congestion, nausea, no vomiting, loose stool. Per pt, she was in usual state of health up until  2 days ago. Last night, patient was taken to  and tested positive for Covid. She was told EKG was abnormal so she came to ED for evaluation. Pt has no current reports of chest pain but endorses weakness and feels like she may pass out, no syncopal episodes. In ER, EKG with no signs of ACS. Cardiac enzymes and troponin are positive. Troponin 169, delta 173. CPK mildly elevated to 329, CKMB 4.6 index is 1.4.  Patient has multiple sick contacts at home,  is flu positive.  Cardiology consulted for possible NSTEMI in setting of covid. Start Remdesivir and Decadron. ID consulted.             Problem/Recommendations 1:  dizziness   likely  multifactorial  orthostatic due to dm autonomic dysfunction likely exacerbated by acute infectious process   plan  continue meclizine 12.5 bid   mri/mra can be done nonemergently   nih 0   ct head   carotids   compression stockings   Problem/Recommendations 2:    covid   currrently on remdesivir and steroids       pt at risk for developing delirium, therefore please institute the following preventative measures if possible          - initiating early mobilization          -minimizing "tethers" - IV, oxygen, catheters, etc          -avoiding   sedatives          -maintaining hydration/nutrition          -avoid anticholinergics - diphenhydramine, etc          -pain control          -sleep wake cycle regulation; avoid day time somnolence           -supportive environment         Thank you for allowing me to participate in the care of this patient. Will continue to follow patient periodically. Please do not hesitate to call me if you have any  questions or if there has been a change in patients neurological status     ________________  Belle Astudillo MD  [facility] [place] [facility] ([facility]  [phone]      33 minutes spent on total encounter; more than 50 % of the visit was  spent counseling about plan of care, compliance to diet/exercise and medication regimen and or  coordinating care by the attending physician.      It is advised that stroke patients follow up with AJIT Solorzano @ [phone] in 1- 2 weeks.   Others please follow up with Dr. Michael Nissenbaum [phone]
[facility]      Seen earlier today Date of service   No new neurological symptoms reported. Remains stable neurologically.   - Today, patient is without complaints.          *****MEDICATIONS: Current medication reviewed and documented.    MEDICATIONS  (STANDING):  aspirin enteric coated 81 milliGRAM(s) Oral daily  cholecalciferol 1000 Unit(s) Oral daily  dexAMETHasone  Injectable 6 milliGRAM(s) IV Push daily  dextrose 5%. 1000 milliLiter(s) (50 mL/Hr) IV Continuous <Continuous>  dextrose 5%. 1000 milliLiter(s) (100 mL/Hr) IV Continuous <Continuous>  dextrose 50% Injectable 12.5 Gram(s) IV Push once  dextrose 50% Injectable 25 Gram(s) IV Push once  dextrose 50% Injectable 25 Gram(s) IV Push once  glucagon  Injectable 1 milliGRAM(s) IntraMuscular once  guaiFENesin ER 1200 milliGRAM(s) Oral every 12 hours  heparin   Injectable 5000 Unit(s) SubCutaneous every 8 hours  influenza  Vaccine (HIGH DOSE) 0.7 milliLiter(s) IntraMuscular once  insulin glargine Injectable (LANTUS) 15 Unit(s) SubCutaneous at bedtime  insulin lispro (ADMELOG) corrective regimen sliding scale   SubCutaneous at bedtime  insulin lispro (ADMELOG) corrective regimen sliding scale   SubCutaneous three times a day before meals  insulin lispro Injectable (ADMELOG) 10 Unit(s) SubCutaneous three times a day before meals  petrolatum white Ointment 1 Application(s) Topical daily  potassium chloride    Tablet ER 40 milliEquivalent(s) Oral once  traZODone 150 milliGRAM(s) Oral at bedtime    MEDICATIONS  (PRN):  acetaminophen     Tablet .. 650 milliGRAM(s) Oral every 6 hours PRN Temp greater or equal to 38C (100.4F), Mild Pain (1 - 3)  albuterol    90 MICROgram(s) HFA Inhaler 2 Puff(s) Inhalation every 6 hours PRN Shortness of Breath and/or Wheezing  aluminum hydroxide/magnesium hydroxide/simethicone Suspension 30 milliLiter(s) Oral every 4 hours PRN Dyspepsia  benzocaine 15 mG/menthol 3.6 mG Lozenge 1 Lozenge Oral every 4 hours PRN Sore Throat  benzonatate 100 milliGRAM(s) Oral three times a day PRN Cough  dextrose Oral Gel 15 Gram(s) Oral once PRN Blood Glucose LESS THAN 70 milliGRAM(s)/deciliter  meclizine 12.5 milliGRAM(s) Oral two times a day PRN Dizziness  melatonin 3 milliGRAM(s) Oral at bedtime PRN Insomnia  ondansetron Injectable 4 milliGRAM(s) IV Push every 8 hours PRN Nausea and/or Vomiting  simethicone 80 milliGRAM(s) Chew three times a day PRN Gas          ***** VITAL SIGNS:   Vital Signs Last 24 Hrs       T(F): 98 (11-23-22 @ 21:50), Max: 98.6 (11-23-22 @ 12:07)  HR: 78 (11-23-22 @ 21:50) (78 - 92)  BP: 110/93 (11-23-22 @ 21:50) (110/93 - 131/58)  RR: 18 (11-23-22 @ 21:50) (17 - 18)  SpO2: 99% (11-23-22 @ 21:50) (95% - 99%)  Wt(kg): --  I&O's Summary    22 Nov 2022 07:01  -  23 Nov 2022 07:00  --------------------------------------------------------  IN: 0 mL / OUT: 800 mL / NET: -800 mL    23 Nov 2022 07:01  -  23 Nov 2022 23:28  --------------------------------------------------------  IN: 0 mL / OUT: 600 mL / NET: -600 mL             *****PHYSICAL EXAM:   alert oriented x 3 attention comprehension are fair.  Able to name, repeat.   EOmi fundi not visualized   no nystagmus VFF to confrontation  Tongue is midline  Palate elevates symmetrically   Moving all 4 ext spontaneously no drift appreciated    Gait not assessed.            *****LAB AND IMAGING:                        10.3   14.34 )-----------( 428      ( 23 Nov 2022 04:53 )             32.6               11-23    134<L>  |  102  |  39<H>  ----------------------------<  235<H>  4.7   |  21<L>  |  1.22    Ca    9.3      23 Nov 2022 04:53  Phos  3.3     11-23  Mg     1.50     11-23    TPro  6.7  /  Alb  3.4  /  TBili  0.2  /  DBili  <0.2  /  AST  16  /  ALT  23  /  AlkPhos  77  11-23                         [All pertinent recent Imaging/Reports reviewed]            *****A S S E S S M E N T   A N D   P L A N :  77 year old female, PMH of HTN, HLD, NIDDM, former smoker, quit 15 years ago, vaccinated for covid and boosted in the summer, presents to ER with symptoms of shortness of breath, cough, congestion, nausea, no vomiting, loose stool. Per pt, she was in usual state of health up until  2 days ago. Last night, patient was taken to UC and tested positive for Covid. She was told EKG was abnormal so she came to ED for evaluation. Pt has no current reports of chest pain but endorses weakness and feels like she may pass out, no syncopal episodes. In ER, EKG with no signs of ACS. Cardiac enzymes and troponin are positive. Troponin 169, delta 173. CPK mildly elevated to 329, CKMB 4.6 index is 1.4.  Patient has multiple sick contacts at home,  is flu positive.  Cardiology consulted for possible NSTEMI in setting of covid. Start Remdesivir and Decadron. ID consulted.             Problem/Recommendations 1:  dizziness   likely  multifactorial  orthostatic due to dm autonomic dysfunction likely exacerbated by acute infectious process   plan  continue meclizine 12.5 bid   mri/mra can be done nonemergently   nih 0   ct head no acute path   carotids   compression stockings     Problem/Recommendations 2:    covid   currrently completing treatmeent        pt at risk for developing delirium, therefore please institute the following preventative measures if possible          - initiating early mobilization          -minimizing "tethers" - IV, oxygen, catheters, etc          -avoiding   sedatives          -maintaining hydration/nutrition          -avoid anticholinergics - diphenhydramine, etc          -pain control          -sleep wake cycle regulation; avoid day time somnolence           -supportive environment     Thank you for allowing me to participate in the care of this patient. Will continue to follow patient periodically. Please do not hesitate to call me if you have any  questions or if there has been a change in patients neurological status     ________________  Belle Astudillo MD  [facility] ([facility]  [phone]      33 minutes spent on total encounter; more than 50 % of the visit was  spent counseling about plan of care, compliance to diet/exercise and medication regimen and or  coordinating care by the attending physician.      It is advised that stroke patients follow up with AJIT Solorzano @ [phone] in 1- 2 weeks.   Others please follow up with Dr. Michael Nissenbaum [phone]
SUBJECTIVE/ OVERNIGHT EVENTS:  sugars high  insulin increased  still reporting "room spinning"  orthostatics okay  no cp, no sob, no n/v/d. no abdominal pain.  no headache.  pain control  out of bed in chair.   stable on room air      --------------------------------------------------------------------------------------------  LABS:                        10.3   14.34 )-----------( 428      ( 23 Nov 2022 04:53 )             32.6     11-23    134<L>  |  102  |  39<H>  ----------------------------<  235<H>  4.7   |  21<L>  |  1.22    Ca    9.3      23 Nov 2022 04:53  Phos  3.3     11-23  Mg     1.50     11-23    TPro  6.7  /  Alb  3.4  /  TBili  0.2  /  DBili  <0.2  /  AST  16  /  ALT  23  /  AlkPhos  77  11-23      CAPILLARY BLOOD GLUCOSE      POCT Blood Glucose.: 390 mg/dL (23 Nov 2022 13:12)  POCT Blood Glucose.: 343 mg/dL (23 Nov 2022 09:01)  POCT Blood Glucose.: 352 mg/dL (22 Nov 2022 21:10)  POCT Blood Glucose.: 383 mg/dL (22 Nov 2022 17:42)            RADIOLOGY & ADDITIONAL TESTS:    Imaging Personally Reviewed:  [x] YES  [ ] NO    Consultant(s) Notes Reviewed:  [x] YES  [ ] NO    MEDICATIONS  (STANDING):  aspirin enteric coated 81 milliGRAM(s) Oral daily  cholecalciferol 1000 Unit(s) Oral daily  dexAMETHasone  Injectable 6 milliGRAM(s) IV Push daily  dextrose 5%. 1000 milliLiter(s) (100 mL/Hr) IV Continuous <Continuous>  dextrose 5%. 1000 milliLiter(s) (50 mL/Hr) IV Continuous <Continuous>  dextrose 50% Injectable 25 Gram(s) IV Push once  dextrose 50% Injectable 12.5 Gram(s) IV Push once  dextrose 50% Injectable 25 Gram(s) IV Push once  glucagon  Injectable 1 milliGRAM(s) IntraMuscular once  guaiFENesin ER 1200 milliGRAM(s) Oral every 12 hours  heparin   Injectable 5000 Unit(s) SubCutaneous every 8 hours  influenza  Vaccine (HIGH DOSE) 0.7 milliLiter(s) IntraMuscular once  insulin glargine Injectable (LANTUS) 15 Unit(s) SubCutaneous at bedtime  insulin lispro (ADMELOG) corrective regimen sliding scale   SubCutaneous at bedtime  insulin lispro (ADMELOG) corrective regimen sliding scale   SubCutaneous three times a day before meals  insulin lispro Injectable (ADMELOG) 10 Unit(s) SubCutaneous three times a day before meals  petrolatum white Ointment 1 Application(s) Topical daily  potassium chloride    Tablet ER 40 milliEquivalent(s) Oral once  traZODone 150 milliGRAM(s) Oral at bedtime    MEDICATIONS  (PRN):  acetaminophen     Tablet .. 650 milliGRAM(s) Oral every 6 hours PRN Temp greater or equal to 38C (100.4F), Mild Pain (1 - 3)  albuterol    90 MICROgram(s) HFA Inhaler 2 Puff(s) Inhalation every 6 hours PRN Shortness of Breath and/or Wheezing  aluminum hydroxide/magnesium hydroxide/simethicone Suspension 30 milliLiter(s) Oral every 4 hours PRN Dyspepsia  benzocaine 15 mG/menthol 3.6 mG Lozenge 1 Lozenge Oral every 4 hours PRN Sore Throat  benzonatate 100 milliGRAM(s) Oral three times a day PRN Cough  dextrose Oral Gel 15 Gram(s) Oral once PRN Blood Glucose LESS THAN 70 milliGRAM(s)/deciliter  melatonin 3 milliGRAM(s) Oral at bedtime PRN Insomnia  ondansetron Injectable 4 milliGRAM(s) IV Push every 8 hours PRN Nausea and/or Vomiting  simethicone 80 milliGRAM(s) Chew three times a day PRN Gas      Care Discussed with Consultants/Other Providers [x] YES  [ ] NO    Vital Signs Last 24 Hrs  T(C): 37 (23 Nov 2022 12:07), Max: 37 (23 Nov 2022 12:07)  T(F): 98.6 (23 Nov 2022 12:07), Max: 98.6 (23 Nov 2022 12:07)  HR: 83 (23 Nov 2022 12:07) (79 - 97)  BP: 125/75 (23 Nov 2022 12:07) (123/68 - 130/79)  BP(mean): --  RR: 18 (23 Nov 2022 12:07) (17 - 18)  SpO2: 98% (23 Nov 2022 12:07) (98% - 98%)    Parameters below as of 23 Nov 2022 12:07  Patient On (Oxygen Delivery Method): room air      I&O's Summary    22 Nov 2022 07:01  -  23 Nov 2022 07:00  --------------------------------------------------------  IN: 0 mL / OUT: 800 mL / NET: -800 mL        PHYSICAL EXAM:  GENERAL: NAD, well-developed, comfortable on nasal canula   HEAD:  Atraumatic, Normocephalic  EYES: EOMI, PERRLA, conjunctiva and sclera clear  NECK: Supple, No JVD  CHEST/LUNG: mild decrease breath sounds bilaterally; No wheeze   HEART: Regular rate and rhythm; No murmurs, rubs, or gallops  ABDOMEN: Soft, Nontender, Nondistended; Bowel sounds present  Neuro: AAOx3, no focal weakness, 5/5 b/l extremity strength  EXTREMITIES:  2+ Peripheral Pulses, No clubbing, cyanosis, or edema
She did not note much response to the single dose of meclizine yesterday  Minimal phlegm  No hemoptysis    Vital Signs Last 24 Hrs  T(C): 36.8 (19 Nov 2022 05:05), Max: 36.8 (19 Nov 2022 05:05)  T(F): 98.2 (19 Nov 2022 05:05), Max: 98.2 (19 Nov 2022 05:05)  HR: 65 (19 Nov 2022 05:05) (65 - 77)  BP: 130/60 (19 Nov 2022 05:05) (114/66 - 143/63)  BP(mean): --  RR: 18 (19 Nov 2022 05:05) (16 - 18)  SpO2: 95% (19 Nov 2022 05:05) (95% - 98%)    I&O's Summary      PHYSICAL EXAM:  GENERAL: NAD, well-developed, comfortable on nasal canula   HEAD:  Atraumatic, Normocephalic  EYES: EOMI, PERRLA, conjunctiva and sclera clear  NECK: Supple, No JVD  CHEST/LUNG: mild decrease breath sounds bilaterally; No wheeze   HEART: Regular rate and rhythm; No murmurs, rubs, or gallops  ABDOMEN: Soft, Nontender, Nondistended; Bowel sounds present  Neuro: AAOx3, no focal weakness, 5/5 b/l extremity strength  EXTREMITIES:  2+ Peripheral Pulses, No clubbing, cyanosis, or edema    LABS:                        9.5    11.33 )-----------( 366      ( 19 Nov 2022 05:13 )             29.7     11-19    135  |  105  |  43<H>  ----------------------------<  212<H>  5.0   |  19<L>  |  1.19    Ca    8.9      19 Nov 2022 05:13  Phos  3.0     11-18  Mg     1.60     11-19    TPro  6.1  /  Alb  3.0<L>  /  TBili  0.2  /  DBili  <0.2  /  AST  42<H>  /  ALT  28  /  AlkPhos  75  11-19      CAPILLARY BLOOD GLUCOSE      POCT Blood Glucose.: 280 mg/dL (19 Nov 2022 08:54)  POCT Blood Glucose.: 281 mg/dL (18 Nov 2022 22:39)  POCT Blood Glucose.: 305 mg/dL (18 Nov 2022 17:00)  POCT Blood Glucose.: 353 mg/dL (18 Nov 2022 12:46)            RADIOLOGY & ADDITIONAL TESTS:    Imaging Personally Reviewed:  [x] YES  [ ] NO    Will obtain old records:  [ ] YES  [x] NO
She still feels as if she needs to expend extra effort to draw air in and out  afebrile overnight  Saturating well on room air    Vital Signs Last 24 Hrs  T(C): 37 (17 Nov 2022 05:23), Max: 37 (17 Nov 2022 05:23)  T(F): 98.6 (17 Nov 2022 05:23), Max: 98.6 (17 Nov 2022 05:23)  HR: 91 (17 Nov 2022 05:23) (89 - 99)  BP: 130/88 (17 Nov 2022 05:23) (114/63 - 134/74)  BP(mean): --  RR: 19 (17 Nov 2022 05:23) (18 - 20)  SpO2: 95% (17 Nov 2022 05:23) (95% - 98%)    I&O's Summary    11-16-22 @ 07:01  -  11-17-22 @ 07:00  --------------------------------------------------------  IN: 200 mL / OUT: 800 mL / NET: -600 mL        PHYSICAL EXAM:  GENERAL: NAD, well-developed, comfortable on nasal canula   HEAD:  Atraumatic, Normocephalic  EYES: EOMI, PERRLA, conjunctiva and sclera clear  NECK: Supple, No JVD  CHEST/LUNG: mild decrease breath sounds bilaterally; No wheeze   HEART: Regular rate and rhythm; No murmurs, rubs, or gallops  ABDOMEN: Soft, Nontender, Nondistended; Bowel sounds present  Neuro: AAOx3, no focal weakness, 5/5 b/l extremity strength  EXTREMITIES:  2+ Peripheral Pulses, No clubbing, cyanosis, or edema    LABS:                        10.2   9.08  )-----------( 284      ( 17 Nov 2022 06:43 )             31.2     11-17    136  |  102  |  34<H>  ----------------------------<  202<H>  4.0   |  21<L>  |  1.22    Ca    9.2      17 Nov 2022 06:43    TPro  6.6  /  Alb  3.2<L>  /  TBili  0.4  /  DBili  <0.2  /  AST  47<H>  /  ALT  35<H>  /  AlkPhos  82  11-17      CAPILLARY BLOOD GLUCOSE      POCT Blood Glucose.: 279 mg/dL (17 Nov 2022 08:58)  POCT Blood Glucose.: 299 mg/dL (16 Nov 2022 21:24)  POCT Blood Glucose.: 322 mg/dL (16 Nov 2022 17:48)  POCT Blood Glucose.: 333 mg/dL (16 Nov 2022 13:12)            RADIOLOGY & ADDITIONAL TESTS:    Imaging Personally Reviewed:  [x] YES  [ ] NO    Will obtain old records:  [ ] YES  [x] NO
Date of Service: 11-21-22 @ 17:36    Patient is a 77y old  Female who presents with a chief complaint of Shortness of breath     (21 Nov 2022 14:47)      Any change in ROS:  doing ok : no sob; on room air:     MEDICATIONS  (STANDING):  aspirin enteric coated 81 milliGRAM(s) Oral daily  cholecalciferol 1000 Unit(s) Oral daily  dexAMETHasone  Injectable 6 milliGRAM(s) IV Push daily  dextrose 5%. 1000 milliLiter(s) (100 mL/Hr) IV Continuous <Continuous>  dextrose 5%. 1000 milliLiter(s) (50 mL/Hr) IV Continuous <Continuous>  dextrose 50% Injectable 25 Gram(s) IV Push once  dextrose 50% Injectable 12.5 Gram(s) IV Push once  dextrose 50% Injectable 25 Gram(s) IV Push once  glucagon  Injectable 1 milliGRAM(s) IntraMuscular once  guaiFENesin ER 1200 milliGRAM(s) Oral every 12 hours  heparin   Injectable 5000 Unit(s) SubCutaneous every 8 hours  influenza  Vaccine (HIGH DOSE) 0.7 milliLiter(s) IntraMuscular once  insulin glargine Injectable (LANTUS) 12 Unit(s) SubCutaneous at bedtime  insulin lispro (ADMELOG) corrective regimen sliding scale   SubCutaneous three times a day before meals  insulin lispro (ADMELOG) corrective regimen sliding scale   SubCutaneous at bedtime  insulin lispro Injectable (ADMELOG) 8 Unit(s) SubCutaneous three times a day before meals  lactated ringers. 1000 milliLiter(s) (75 mL/Hr) IV Continuous <Continuous>  petrolatum white Ointment 1 Application(s) Topical daily  potassium chloride    Tablet ER 40 milliEquivalent(s) Oral once  traZODone 150 milliGRAM(s) Oral at bedtime    MEDICATIONS  (PRN):  acetaminophen     Tablet .. 650 milliGRAM(s) Oral every 6 hours PRN Temp greater or equal to 38C (100.4F), Mild Pain (1 - 3)  albuterol    90 MICROgram(s) HFA Inhaler 2 Puff(s) Inhalation every 6 hours PRN Shortness of Breath and/or Wheezing  aluminum hydroxide/magnesium hydroxide/simethicone Suspension 30 milliLiter(s) Oral every 4 hours PRN Dyspepsia  benzocaine 15 mG/menthol 3.6 mG Lozenge 1 Lozenge Oral every 4 hours PRN Sore Throat  benzonatate 100 milliGRAM(s) Oral three times a day PRN Cough  dextrose Oral Gel 15 Gram(s) Oral once PRN Blood Glucose LESS THAN 70 milliGRAM(s)/deciliter  melatonin 3 milliGRAM(s) Oral at bedtime PRN Insomnia  ondansetron Injectable 4 milliGRAM(s) IV Push every 8 hours PRN Nausea and/or Vomiting    Vital Signs Last 24 Hrs  T(C): 36.7 (21 Nov 2022 12:00), Max: 36.8 (21 Nov 2022 05:54)  T(F): 98.1 (21 Nov 2022 12:00), Max: 98.3 (21 Nov 2022 05:54)  HR: 69 (21 Nov 2022 12:00) (69 - 79)  BP: 108/68 (21 Nov 2022 12:00) (108/68 - 139/63)  BP(mean): --  RR: 18 (21 Nov 2022 12:00) (17 - 18)  SpO2: 98% (21 Nov 2022 12:00) (96% - 99%)    Parameters below as of 21 Nov 2022 12:00  Patient On (Oxygen Delivery Method): room air        I&O's Summary    20 Nov 2022 07:01  -  21 Nov 2022 07:00  --------------------------------------------------------  IN: 0 mL / OUT: 0 mL / NET: 0 mL          Physical Exam:   GENERAL: NAD, well-groomed, well-developed  HEENT: TABITHA/   Atraumatic, Normocephalic  ENMT: No tonsillar erythema, exudates, or enlargement; Moist mucous membranes, Good dentition, No lesions  NECK: Supple, No JVD, Normal thyroid  CHEST/LUNG: Clear to auscultaion, ; No rales, rhonchi, wheezing, or rubs  CVS: Regular rate and rhythm; No murmurs, rubs, or gallops  GI: : Soft, Nontender, Nondistended; Bowel sounds present  NERVOUS SYSTEM:  Alert & Oriented X3  EXTREMITIES:  2+ Peripheral Pulses, No clubbing, cyanosis, or edema  LYMPH: No lymphadenopathy noted  SKIN: No rashes or lesions  ENDOCRINOLOGY: No Thyromegaly  PSYCH: Appropriate    Labs:    CARDIAC MARKERS ( 21 Nov 2022 05:30 )  x     / x     / 75 U/L / x     / x                                10.3   15.59 )-----------( 445      ( 21 Nov 2022 05:30 )             32.6                         10.8   13.72 )-----------( 440      ( 20 Nov 2022 05:40 )             33.4                         9.5    11.33 )-----------( 366      ( 19 Nov 2022 05:13 )             29.7                         10.7   11.02 )-----------( 345      ( 18 Nov 2022 06:49 )             33.5     11-21    135  |  103  |  45<H>  ----------------------------<  224<H>  5.0   |  21<L>  |  1.42<H>  11-20    138  |  104  |  41<H>  ----------------------------<  177<H>  4.7   |  22  |  1.26  11-19    135  |  105  |  43<H>  ----------------------------<  212<H>  5.0   |  19<L>  |  1.19  11-18    136  |  105  |  40<H>  ----------------------------<  206<H>  4.4   |  18<L>  |  1.11    Ca    9.6      21 Nov 2022 05:30  Ca    9.5      20 Nov 2022 05:40  Mg     1.50     11-21  Mg     1.40     11-20    TPro  6.8  /  Alb  3.4  /  TBili  0.2  /  DBili  <0.2  /  AST  16  /  ALT  28  /  AlkPhos  84  11-21  TPro  7.1  /  Alb  3.3  /  TBili  0.3  /  DBili  <0.2  /  AST  23  /  ALT  31  /  AlkPhos  83  11-20  TPro  6.1  /  Alb  3.0<L>  /  TBili  0.2  /  DBili  <0.2  /  AST  42<H>  /  ALT  28  /  AlkPhos  75  11-19  TPro  6.7  /  Alb  3.2<L>  /  TBili  0.3  /  DBili  <0.2  /  AST  29  /  ALT  32  /  AlkPhos  83  11-18    CAPILLARY BLOOD GLUCOSE      POCT Blood Glucose.: 394 mg/dL (21 Nov 2022 13:14)  POCT Blood Glucose.: 419 mg/dL (21 Nov 2022 13:07)  POCT Blood Glucose.: 267 mg/dL (21 Nov 2022 09:07)  POCT Blood Glucose.: 345 mg/dL (20 Nov 2022 21:49)  POCT Blood Glucose.: 374 mg/dL (20 Nov 2022 18:28)  POCT Blood Glucose.: 392 mg/dL (20 Nov 2022 18:27)      LIVER FUNCTIONS - ( 21 Nov 2022 05:30 )  Alb: 3.4 g/dL / Pro: 6.8 g/dL / ALK PHOS: 84 U/L / ALT: 28 U/L / AST: 16 U/L / GGT: x               D-Dimer Assay, Quantitative: 352 ng/mL DDU (11-20 @ 13:40)  D-Dimer Assay, Quantitative: 410 ng/mL DDU (11-18 @ 06:49)        RECENT CULTURES:  11-16 @ 09:40 .Blood Blood-Peripheral                No Growth Final    11-16 @ 09:20 .Blood Blood-Peripheral                No Growth Final      < from: Xray Chest 1 View- PORTABLE-Urgent (Xray Chest 1 View- PORTABLE-Urgent .) (11.14.22 @ 01:17) >  COMPARISON: None available..    FINDINGS:    No focal consolidation.  No pneumothorax or large pleural effusion.  Heart size cannot be accurately assessed in this projection.  No acute osseous abnormality.    IMPRESSION:    No focal consolidation.    --- End of Report ---    < end of copied text >      RESPIRATORY CULTURES:          Studies  Chest X-RAY  CT SCAN Chest   Venous Dopplers: LE:   CT Abdomen  Others              
Date of Service: 11-23-22 @ 16:25    Patient is a 77y old  Female who presents with a chief complaint of Shortness of breath     (21 Nov 2022 14:47)      Any change in ROS: seems OK : no sob:     MEDICATIONS  (STANDING):  aspirin enteric coated 81 milliGRAM(s) Oral daily  cholecalciferol 1000 Unit(s) Oral daily  dexAMETHasone  Injectable 6 milliGRAM(s) IV Push daily  dextrose 5%. 1000 milliLiter(s) (100 mL/Hr) IV Continuous <Continuous>  dextrose 5%. 1000 milliLiter(s) (50 mL/Hr) IV Continuous <Continuous>  dextrose 50% Injectable 25 Gram(s) IV Push once  dextrose 50% Injectable 12.5 Gram(s) IV Push once  dextrose 50% Injectable 25 Gram(s) IV Push once  glucagon  Injectable 1 milliGRAM(s) IntraMuscular once  guaiFENesin ER 1200 milliGRAM(s) Oral every 12 hours  heparin   Injectable 5000 Unit(s) SubCutaneous every 8 hours  influenza  Vaccine (HIGH DOSE) 0.7 milliLiter(s) IntraMuscular once  insulin glargine Injectable (LANTUS) 15 Unit(s) SubCutaneous at bedtime  insulin lispro (ADMELOG) corrective regimen sliding scale   SubCutaneous at bedtime  insulin lispro (ADMELOG) corrective regimen sliding scale   SubCutaneous three times a day before meals  insulin lispro Injectable (ADMELOG) 10 Unit(s) SubCutaneous three times a day before meals  petrolatum white Ointment 1 Application(s) Topical daily  potassium chloride    Tablet ER 40 milliEquivalent(s) Oral once  traZODone 150 milliGRAM(s) Oral at bedtime    MEDICATIONS  (PRN):  acetaminophen     Tablet .. 650 milliGRAM(s) Oral every 6 hours PRN Temp greater or equal to 38C (100.4F), Mild Pain (1 - 3)  albuterol    90 MICROgram(s) HFA Inhaler 2 Puff(s) Inhalation every 6 hours PRN Shortness of Breath and/or Wheezing  aluminum hydroxide/magnesium hydroxide/simethicone Suspension 30 milliLiter(s) Oral every 4 hours PRN Dyspepsia  benzocaine 15 mG/menthol 3.6 mG Lozenge 1 Lozenge Oral every 4 hours PRN Sore Throat  benzonatate 100 milliGRAM(s) Oral three times a day PRN Cough  dextrose Oral Gel 15 Gram(s) Oral once PRN Blood Glucose LESS THAN 70 milliGRAM(s)/deciliter  melatonin 3 milliGRAM(s) Oral at bedtime PRN Insomnia  ondansetron Injectable 4 milliGRAM(s) IV Push every 8 hours PRN Nausea and/or Vomiting  simethicone 80 milliGRAM(s) Chew three times a day PRN Gas    Vital Signs Last 24 Hrs  T(C): 37 (23 Nov 2022 12:07), Max: 37 (23 Nov 2022 12:07)  T(F): 98.6 (23 Nov 2022 12:07), Max: 98.6 (23 Nov 2022 12:07)  HR: 83 (23 Nov 2022 12:07) (79 - 97)  BP: 125/75 (23 Nov 2022 12:07) (123/68 - 130/79)  BP(mean): --  RR: 18 (23 Nov 2022 12:07) (17 - 18)  SpO2: 98% (23 Nov 2022 12:07) (98% - 98%)    Parameters below as of 23 Nov 2022 12:07  Patient On (Oxygen Delivery Method): room air        I&O's Summary    22 Nov 2022 07:01  -  23 Nov 2022 07:00  --------------------------------------------------------  IN: 0 mL / OUT: 800 mL / NET: -800 mL          Physical Exam:   GENERAL: NAD, well-groomed, well-developed  HEENT: TABITHA/   Atraumatic, Normocephalic  ENMT: No tonsillar erythema, exudates, or enlargement; Moist mucous membranes, Good dentition, No lesions  NECK: Supple, No JVD, Normal thyroid  CHEST/LUNG: Clear to auscultaion  CVS: Regular rate and rhythm; No murmurs, rubs, or gallops  GI: : Soft, Nontender, Nondistended; Bowel sounds present  NERVOUS SYSTEM:  Alert & Oriented X3  EXTREMITIES:  2+ Peripheral Pulses, No clubbing, cyanosis, or edema  LYMPH: No lymphadenopathy noted  SKIN: No rashes or lesions  ENDOCRINOLOGY: No Thyromegaly  PSYCH: Appropriate    Labs:                              10.3   14.34 )-----------( 428      ( 23 Nov 2022 04:53 )             32.6                         9.9    14.34 )-----------( 429      ( 22 Nov 2022 04:31 )             31.0                         10.3   15.59 )-----------( 445      ( 21 Nov 2022 05:30 )             32.6                         10.8   13.72 )-----------( 440      ( 20 Nov 2022 05:40 )             33.4     11-23    134<L>  |  102  |  39<H>  ----------------------------<  235<H>  4.7   |  21<L>  |  1.22  11-22    135  |  101  |  41<H>  ----------------------------<  237<H>  4.6   |  20<L>  |  1.31<H>  11-21    135  |  103  |  45<H>  ----------------------------<  224<H>  5.0   |  21<L>  |  1.42<H>  11-20    138  |  104  |  41<H>  ----------------------------<  177<H>  4.7   |  22  |  1.26    Ca    9.3      23 Nov 2022 04:53  Ca    9.3      22 Nov 2022 04:31  Phos  3.3     11-23  Phos  4.1     11-22  Mg     1.50     11-23  Mg     1.80     11-22    TPro  6.7  /  Alb  3.4  /  TBili  0.2  /  DBili  <0.2  /  AST  16  /  ALT  23  /  AlkPhos  77  11-23  TPro  6.6  /  Alb  3.4  /  TBili  0.2  /  DBili  <0.2  /  AST  15  /  ALT  23  /  AlkPhos  78  11-22  TPro  6.8  /  Alb  3.4  /  TBili  0.2  /  DBili  <0.2  /  AST  16  /  ALT  28  /  AlkPhos  84  11-21  TPro  7.1  /  Alb  3.3  /  TBili  0.3  /  DBili  <0.2  /  AST  23  /  ALT  31  /  AlkPhos  83  11-20    CAPILLARY BLOOD GLUCOSE      POCT Blood Glucose.: 390 mg/dL (23 Nov 2022 13:12)  POCT Blood Glucose.: 343 mg/dL (23 Nov 2022 09:01)  POCT Blood Glucose.: 352 mg/dL (22 Nov 2022 21:10)  POCT Blood Glucose.: 383 mg/dL (22 Nov 2022 17:42)      LIVER FUNCTIONS - ( 23 Nov 2022 04:53 )  Alb: 3.4 g/dL / Pro: 6.7 g/dL / ALK PHOS: 77 U/L / ALT: 23 U/L / AST: 16 U/L / GGT: x               D-Dimer Assay, Quantitative: 332 ng/mL DDU (11-23 @ 04:53)  D-Dimer Assay, Quantitative: 352 ng/mL DDU (11-20 @ 13:40)  D-Dimer Assay, Quantitative: 410 ng/mL DDU (11-18 @ 06:49)        RECENT CULTURES:    rad< from: Xray Chest 1 View- PORTABLE-Urgent (Xray Chest 1 View- PORTABLE-Urgent .) (11.14.22 @ 01:17) >    FINDINGS:    No focal consolidation.  No pneumothorax or large pleural effusion.  Heart size cannot be accurately assessed in this projection.  No acute osseous abnormality.    IMPRESSION:    No focal consolidation.    --- End of Report ---          ANTWAN BHAGAT MD; Resident Radiologist  This document has been electronically signed.  KARAN ROAHC MD; Attending Radiologist  This document has been electronically signed. Nov 14 2022      < end of copied text >      RESPIRATORY CULTURES:          Studies  Chest X-RAY  CT SCAN Chest   Venous Dopplers: LE:   CT Abdomen  Others              
Date of Service: 11-25-22 @ 16:09    Patient is a 77y old  Female who presents with a chief complaint of Shortness of breath     (21 Nov 2022 14:47)      Any change in ROS: she seems pretty good : on room air:  no sob:  minor cough     MEDICATIONS  (STANDING):  aspirin enteric coated 81 milliGRAM(s) Oral daily  cholecalciferol 1000 Unit(s) Oral daily  dextrose 5%. 1000 milliLiter(s) (100 mL/Hr) IV Continuous <Continuous>  dextrose 5%. 1000 milliLiter(s) (50 mL/Hr) IV Continuous <Continuous>  dextrose 50% Injectable 25 Gram(s) IV Push once  dextrose 50% Injectable 12.5 Gram(s) IV Push once  dextrose 50% Injectable 25 Gram(s) IV Push once  glucagon  Injectable 1 milliGRAM(s) IntraMuscular once  guaiFENesin ER 1200 milliGRAM(s) Oral every 12 hours  heparin   Injectable 5000 Unit(s) SubCutaneous every 8 hours  influenza  Vaccine (HIGH DOSE) 0.7 milliLiter(s) IntraMuscular once  insulin glargine Injectable (LANTUS) 16 Unit(s) SubCutaneous at bedtime  insulin lispro (ADMELOG) corrective regimen sliding scale   SubCutaneous at bedtime  insulin lispro (ADMELOG) corrective regimen sliding scale   SubCutaneous three times a day before meals  insulin lispro Injectable (ADMELOG) 14 Unit(s) SubCutaneous three times a day before meals  petrolatum white Ointment 1 Application(s) Topical daily  potassium chloride    Tablet ER 40 milliEquivalent(s) Oral once  traZODone 150 milliGRAM(s) Oral at bedtime    MEDICATIONS  (PRN):  acetaminophen     Tablet .. 650 milliGRAM(s) Oral every 6 hours PRN Temp greater or equal to 38C (100.4F), Mild Pain (1 - 3)  albuterol    90 MICROgram(s) HFA Inhaler 2 Puff(s) Inhalation every 6 hours PRN Shortness of Breath and/or Wheezing  aluminum hydroxide/magnesium hydroxide/simethicone Suspension 30 milliLiter(s) Oral every 4 hours PRN Dyspepsia  benzocaine 15 mG/menthol 3.6 mG Lozenge 1 Lozenge Oral every 4 hours PRN Sore Throat  benzonatate 100 milliGRAM(s) Oral three times a day PRN Cough  dextrose Oral Gel 15 Gram(s) Oral once PRN Blood Glucose LESS THAN 70 milliGRAM(s)/deciliter  meclizine 25 milliGRAM(s) Oral two times a day PRN Dizziness  melatonin 3 milliGRAM(s) Oral at bedtime PRN Insomnia  ondansetron Injectable 4 milliGRAM(s) IV Push every 8 hours PRN Nausea and/or Vomiting  simethicone 80 milliGRAM(s) Chew three times a day PRN Gas    Vital Signs Last 24 Hrs  T(C): 36.6 (25 Nov 2022 13:30), Max: 36.7 (24 Nov 2022 21:30)  T(F): 97.8 (25 Nov 2022 13:30), Max: 98 (24 Nov 2022 21:30)  HR: 98 (25 Nov 2022 13:30) (72 - 98)  BP: 112/58 (25 Nov 2022 13:30) (112/58 - 143/76)  BP(mean): --  RR: 17 (25 Nov 2022 13:30) (17 - 18)  SpO2: 97% (25 Nov 2022 13:30) (96% - 99%)    Parameters below as of 25 Nov 2022 13:30  Patient On (Oxygen Delivery Method): room air        I&O's Summary    25 Nov 2022 07:01  -  25 Nov 2022 16:09  --------------------------------------------------------  IN: 0 mL / OUT: 1000 mL / NET: -1000 mL          Physical Exam:   GENERAL: NAD, well-groomed, well-developed  HEENT: TABITHA/   Atraumatic, Normocephalic  ENMT: No tonsillar erythema, exudates, or enlargement; Moist mucous membranes, Good dentition, No lesions  NECK: Supple, No JVD, Normal thyroid  CHEST/LUNG: Clear to auscultaion, ; No rales, rhonchi, wheezing, or rubs  CVS: Regular rate and rhythm; No murmurs, rubs, or gallops  GI: : Soft, Nontender, Nondistended; Bowel sounds present  NERVOUS SYSTEM:  Alert & Oriented X3  EXTREMITIES:  2+ Peripheral Pulses, No clubbing, cyanosis, or edema  LYMPH: No lymphadenopathy noted  SKIN: No rashes or lesions  ENDOCRINOLOGY: No Thyromegaly  PSYCH: Appropriate    Labs:                              10.0   16.70 )-----------( 363      ( 25 Nov 2022 06:32 )             31.7                         9.8    15.39 )-----------( 393      ( 24 Nov 2022 07:43 )             31.2                         10.3   14.34 )-----------( 428      ( 23 Nov 2022 04:53 )             32.6                         9.9    14.34 )-----------( 429      ( 22 Nov 2022 04:31 )             31.0     11-25    136  |  104  |  37<H>  ----------------------------<  187<H>  4.2   |  22  |  1.34<H>  11-24    134<L>  |  101  |  37<H>  ----------------------------<  200<H>  4.5   |  19<L>  |  1.27  11-23    134<L>  |  102  |  39<H>  ----------------------------<  235<H>  4.7   |  21<L>  |  1.22  11-22    135  |  101  |  41<H>  ----------------------------<  237<H>  4.6   |  20<L>  |  1.31<H>    Ca    9.6      25 Nov 2022 06:32  Ca    9.4      24 Nov 2022 07:43  Phos  3.6     11-25  Phos  3.6     11-24  Mg     1.50     11-25  Mg     2.00     11-24    TPro  6.7  /  Alb  3.4  /  TBili  0.2  /  DBili  <0.2  /  AST  16  /  ALT  23  /  AlkPhos  77  11-23  TPro  6.6  /  Alb  3.4  /  TBili  0.2  /  DBili  <0.2  /  AST  15  /  ALT  23  /  AlkPhos  78  11-22    CAPILLARY BLOOD GLUCOSE      POCT Blood Glucose.: 314 mg/dL (25 Nov 2022 12:24)  POCT Blood Glucose.: 163 mg/dL (25 Nov 2022 08:33)  POCT Blood Glucose.: 288 mg/dL (25 Nov 2022 01:07)  POCT Blood Glucose.: 423 mg/dL (24 Nov 2022 21:24)  POCT Blood Glucose.: 362 mg/dL (24 Nov 2022 18:30)      rad< from: CT Head No Cont (11.23.22 @ 10:43) >    ACC: [id number] EXAM:  CT BRAIN                          PROCEDURE DATE:  11/23/2022          INTERPRETATION:  Clinical indication: Dizziness    Multiple axial sections were performed from base skull to vertex without   contrast enhancement. Coronal and sagittal reconstructions were performed    This exam is compared prior head CT performed on February 5, 2001.    Increased parenchymal volume loss and chronic microvascular ischemic   changes are identified.    There is no acute hemorrhage mass ormass effect seen.    Hyperostosis frontalis interna is again seen and unchanged    The visualized paranasal sinuses mastoid and middle ear regions appear   clear.    Impression: No acute hemorrhage mass or mass effect.    --- End of Report ---            SAMANTHA WALKER MD; Attending Radiologist  This document has been electronically signed. Nov 23 2022 10:48AM    < end of copied text >  < from: Xray Chest 1 View- PORTABLE-Urgent (Xray Chest 1 View- PORTABLE-Urgent .) (11.14.22 @ 01:17) >    ACC: [id number] EXAM:  XR CHEST PORTABLE URGENT 1V                          PROCEDURE DATE:  11/14/2022          INTERPRETATION:  CLINICAL INFORMATION: Chest pain, cough.    TECHNIQUE: Single portable view of the chest.    COMPARISON: None available..    FINDINGS:    No focal consolidation.  No pneumothorax or large pleural effusion.  Heart size cannot be accurately assessed in this projection.  No acute osseous abnormality.    IMPRESSION:    No focal consolidation.    --- End of Report ---          ANTWAN BHAGAT MD; Resident Radiologist  This document has been electronically signed.  KARAN ROACH MD; Attending Radiologist  This document has been electronically signed. Nov 14 2022  5:42AM    < end of copied text >        D-Dimer Assay, Quantitative: 332 ng/mL DDU (11-23 @ 04:53)  D-Dimer Assay, Quantitative: 352 ng/mL DDU (11-20 @ 13:40)        RECENT CULTURES:        RESPIRATORY CULTURES:          Studies  Chest X-RAY  CT SCAN Chest   Venous Dopplers: LE:   CT Abdomen  Others              
[facility], Division of Infectious Diseases  SHOSHANA Chang Y. Patel, S. Shah, G. Michael  [phone]  ([phone] - weekdays after 5pm and weekends)    Name: CECELIA TOPETE  Age/Gender: 77y Female  MRN: [id number]    Interval History:  Patient seen this morning.   Notes reviewed.   Afebrile.   states vertigo when moving from bed to chair is improving  but reports dizziness yesterday afternoon even when in the chair    Allergies: HMG-CoA reductase inhibitors (Unknown)      Objective:  Vitals:   T(F): 98.2 (11-23-22 @ 05:00), Max: 98.2 (11-23-22 @ 05:00)  HR: 82 (11-23-22 @ 05:00) (75 - 97)  BP: 130/79 (11-23-22 @ 05:00) (121/54 - 130/79)  RR: 17 (11-23-22 @ 05:00) (17 - 18)  SpO2: 98% (11-23-22 @ 05:00) (98% - 98%)  Physical Examination:  General: no acute distress  HEENT: anicteric  Cardio: S1, S2, normal rate  Resp: clear to auscultation bilaterally  Abd: soft, NT, ND  Ext: no LE edema  Skin: warm, dry    Laboratory Studies:  CBC:                       10.3   14.34 )-----------( 428      ( 23 Nov 2022 04:53 )             32.6     WBC Trend:  14.34 11-23-22 @ 04:53  14.34 11-22-22 @ 04:31  15.59 11-21-22 @ 05:30  13.72 11-20-22 @ 05:40  11.33 11-19-22 @ 05:13  11.02 11-18-22 @ 06:49  9.08 11-17-22 @ 06:43    CMP: 11-23    134<L>  |  102  |  39<H>  ----------------------------<  235<H>  4.7   |  21<L>  |  1.22    Ca    9.3      23 Nov 2022 04:53  Phos  3.3     11-23  Mg     1.50     11-23    TPro  6.7  /  Alb  3.4  /  TBili  0.2  /  DBili  <0.2  /  AST  16  /  ALT  23  /  AlkPhos  77  11-23      LIVER FUNCTIONS - ( 23 Nov 2022 04:53 )  Alb: 3.4 g/dL / Pro: 6.7 g/dL / ALK PHOS: 77 U/L / ALT: 23 U/L / AST: 16 U/L / GGT: x               Microbiology: reviewed     Culture - Blood (collected 11-16-22 @ 09:40)  Source: .Blood Blood-Peripheral  Final Report (11-21-22 @ 13:00):    No Growth Final    Culture - Blood (collected 11-16-22 @ 09:20)  Source: .Blood Blood-Peripheral  Final Report (11-21-22 @ 16:00):    No Growth Final    Culture - Urine (collected 11-14-22 @ 01:13)  Source: Clean Catch Clean Catch (Midstream)  Final Report (11-15-22 @ 08:36):    >=3 organisms. Probable collection contamination.        Radiology: reviewed     Medications:  acetaminophen     Tablet .. 650 milliGRAM(s) Oral every 6 hours PRN  albuterol    90 MICROgram(s) HFA Inhaler 2 Puff(s) Inhalation every 6 hours PRN  aluminum hydroxide/magnesium hydroxide/simethicone Suspension 30 milliLiter(s) Oral every 4 hours PRN  aspirin enteric coated 81 milliGRAM(s) Oral daily  benzocaine 15 mG/menthol 3.6 mG Lozenge 1 Lozenge Oral every 4 hours PRN  benzonatate 100 milliGRAM(s) Oral three times a day PRN  cholecalciferol 1000 Unit(s) Oral daily  dexAMETHasone  Injectable 6 milliGRAM(s) IV Push daily  dextrose 5%. 1000 milliLiter(s) IV Continuous <Continuous>  dextrose 5%. 1000 milliLiter(s) IV Continuous <Continuous>  dextrose 50% Injectable 25 Gram(s) IV Push once  dextrose 50% Injectable 12.5 Gram(s) IV Push once  dextrose 50% Injectable 25 Gram(s) IV Push once  dextrose Oral Gel 15 Gram(s) Oral once PRN  glucagon  Injectable 1 milliGRAM(s) IntraMuscular once  guaiFENesin ER 1200 milliGRAM(s) Oral every 12 hours  heparin   Injectable 5000 Unit(s) SubCutaneous every 8 hours  influenza  Vaccine (HIGH DOSE) 0.7 milliLiter(s) IntraMuscular once  insulin glargine Injectable (LANTUS) 12 Unit(s) SubCutaneous at bedtime  insulin lispro (ADMELOG) corrective regimen sliding scale   SubCutaneous at bedtime  insulin lispro (ADMELOG) corrective regimen sliding scale   SubCutaneous three times a day before meals  insulin lispro Injectable (ADMELOG) 8 Unit(s) SubCutaneous three times a day before meals  melatonin 3 milliGRAM(s) Oral at bedtime PRN  ondansetron Injectable 4 milliGRAM(s) IV Push every 8 hours PRN  petrolatum white Ointment 1 Application(s) Topical daily  potassium chloride    Tablet ER 40 milliEquivalent(s) Oral once  traZODone 150 milliGRAM(s) Oral at bedtime    Antimicrobials:  
[facility], Division of Infectious Diseases  [facility]HA[facility] Chang Y. Patel, S. Shah, G. Michael  [phone]  ([phone] - weekdays after 5pm and weekends)    Name: CECELIA TOPETE  Age/Gender: 77y Female  MRN: [id number]    Interval History:  Patient seen this morning.   Notes reviewed.   Afebrile.   reports sore throat and nasal congestion improved    Allergies: HMG-CoA reductase inhibitors (Unknown)      Objective:  Vitals:   T(F): 98.6 (11-17-22 @ 05:23), Max: 98.6 (11-17-22 @ 05:23)  HR: 91 (11-17-22 @ 05:23) (89 - 99)  BP: 130/88 (11-17-22 @ 05:23) (114/63 - 134/74)  RR: 19 (11-17-22 @ 05:23) (18 - 20)  SpO2: 95% (11-17-22 @ 05:23) (95% - 98%)  Physical Examination:  General: no acute distress  HEENT: anicteric  Cardio: S1, S2, tachycardic  Resp: clear to auscultation b/l  Abd: soft, NT, ND  Ext: no LE edema  Skin: warm, dry    Laboratory Studies:  CBC:                       10.2   9.08  )-----------( 284      ( 17 Nov 2022 06:43 )             31.2     WBC Trend:  9.08 11-17-22 @ 06:43  11.56 11-16-22 @ 06:17  11.46 11-15-22 @ 06:48  10.47 11-14-22 @ 11:37  12.03 11-14-22 @ 00:35    CMP: 11-17    136  |  102  |  34<H>  ----------------------------<  202<H>  4.0   |  21<L>  |  1.22    Ca    9.2      17 Nov 2022 06:43    TPro  6.6  /  Alb  3.2<L>  /  TBili  0.4  /  DBili  <0.2  /  AST  47<H>  /  ALT  35<H>  /  AlkPhos  82  11-17      LIVER FUNCTIONS - ( 17 Nov 2022 06:43 )  Alb: 3.2 g/dL / Pro: 6.6 g/dL / ALK PHOS: 82 U/L / ALT: 35 U/L / AST: 47 U/L / GGT: x               Microbiology: reviewed     Culture - Urine (collected 11-14-22 @ 01:13)  Source: Clean Catch Clean Catch (Midstream)  Final Report (11-15-22 @ 08:36):    >=3 organisms. Probable collection contamination.        Radiology: reviewed     Medications:  acetaminophen     Tablet .. 650 milliGRAM(s) Oral every 6 hours PRN  albuterol    90 MICROgram(s) HFA Inhaler 2 Puff(s) Inhalation every 6 hours PRN  aluminum hydroxide/magnesium hydroxide/simethicone Suspension 30 milliLiter(s) Oral every 4 hours PRN  aspirin enteric coated 81 milliGRAM(s) Oral daily  benzocaine 15 mG/menthol 3.6 mG Lozenge 1 Lozenge Oral every 4 hours PRN  benzonatate 100 milliGRAM(s) Oral three times a day PRN  cholecalciferol 1000 Unit(s) Oral daily  dexAMETHasone  Injectable 6 milliGRAM(s) IV Push daily  dextrose 5%. 1000 milliLiter(s) IV Continuous <Continuous>  dextrose 5%. 1000 milliLiter(s) IV Continuous <Continuous>  dextrose 50% Injectable 25 Gram(s) IV Push once  dextrose 50% Injectable 12.5 Gram(s) IV Push once  dextrose 50% Injectable 25 Gram(s) IV Push once  dextrose Oral Gel 15 Gram(s) Oral once PRN  glucagon  Injectable 1 milliGRAM(s) IntraMuscular once  guaiFENesin ER 1200 milliGRAM(s) Oral every 12 hours  heparin   Injectable 5000 Unit(s) SubCutaneous every 8 hours  influenza  Vaccine (HIGH DOSE) 0.7 milliLiter(s) IntraMuscular once  insulin glargine Injectable (LANTUS) 5 Unit(s) SubCutaneous at bedtime  insulin lispro (ADMELOG) corrective regimen sliding scale   SubCutaneous three times a day before meals  melatonin 3 milliGRAM(s) Oral at bedtime PRN  ondansetron Injectable 4 milliGRAM(s) IV Push every 8 hours PRN  petrolatum white Ointment 1 Application(s) Topical daily  potassium chloride    Tablet ER 40 milliEquivalent(s) Oral once  remdesivir  IVPB   IV Intermittent   remdesivir  IVPB 100 milliGRAM(s) IV Intermittent every 24 hours  traZODone 150 milliGRAM(s) Oral at bedtime    Antimicrobials:  remdesivir  IVPB   IV Intermittent   remdesivir  IVPB 100 milliGRAM(s) IV Intermittent every 24 hours  
[facility], Division of Infectious Diseases  SHOSHANA Chang Y. Patel, S. Shah, G. Michael  [phone]  ([phone] - weekdays after 5pm and weekends)    Name: CECEILA TOPETE  Age/Gender: 77y Female  MRN: [id number]    Interval History:  Patient seen this morning.   Notes reviewed.   febrile yesterday evening  states sore throat much improved today  reports she would like a diabetic diet  denies dysuria    Allergies: HMG-CoA reductase inhibitors (Unknown)      Objective:  Vitals:   T(F): 98.1 (11-16-22 @ 06:01), Max: 101.5 (11-15-22 @ 22:39)  HR: 110 (11-16-22 @ 06:01) (90 - 127)  BP: 138/64 (11-16-22 @ 06:01) (120/87 - 159/115)  RR: 22 (11-16-22 @ 06:01) (20 - 23)  SpO2: 100% (11-16-22 @ 06:01) (97% - 100%)  Physical Examination:  General: no acute distress  HEENT: anicteric  Cardio: S1, S2, tachycardic  Resp: distant breath sounds  Abd: soft, NT, ND  Ext: no LE edema  Skin: warm, dry    Laboratory Studies:  CBC:                       10.5   11.56 )-----------( 265      ( 16 Nov 2022 06:17 )             32.9     WBC Trend:  11.56 11-16-22 @ 06:17  11.46 11-15-22 @ 06:48  10.47 11-14-22 @ 11:37  12.03 11-14-22 @ 00:35    CMP: 11-16    137  |  101  |  24<H>  ----------------------------<  191<H>  3.6   |  20<L>  |  1.15    Ca    9.2      16 Nov 2022 06:17  Phos  3.0     11-14  Mg     1.60     11-14    TPro  6.9  /  Alb  3.5  /  TBili  0.5  /  DBili  x   /  AST  69<H>  /  ALT  35<H>  /  AlkPhos  90  11-16      LIVER FUNCTIONS - ( 16 Nov 2022 06:17 )  Alb: 3.5 g/dL / Pro: 6.9 g/dL / ALK PHOS: 90 U/L / ALT: 35 U/L / AST: 69 U/L / GGT: x               Microbiology: reviewed     Culture - Urine (collected 11-14-22 @ 01:13)  Source: Clean Catch Clean Catch (Midstream)  Final Report (11-15-22 @ 08:36):    >=3 organisms. Probable collection contamination.        Radiology: reviewed     Medications:  acetaminophen     Tablet .. 650 milliGRAM(s) Oral every 6 hours PRN  albuterol/ipratropium for Nebulization 3 milliLiter(s) Nebulizer every 6 hours  aluminum hydroxide/magnesium hydroxide/simethicone Suspension 30 milliLiter(s) Oral every 4 hours PRN  aspirin enteric coated 81 milliGRAM(s) Oral daily  benzocaine 15 mG/menthol 3.6 mG Lozenge 1 Lozenge Oral every 4 hours PRN  benzonatate 100 milliGRAM(s) Oral three times a day PRN  cholecalciferol 1000 Unit(s) Oral daily  dexAMETHasone  Injectable 6 milliGRAM(s) IV Push daily  dextrose 5%. 1000 milliLiter(s) IV Continuous <Continuous>  dextrose 5%. 1000 milliLiter(s) IV Continuous <Continuous>  dextrose 50% Injectable 25 Gram(s) IV Push once  dextrose 50% Injectable 12.5 Gram(s) IV Push once  dextrose 50% Injectable 25 Gram(s) IV Push once  dextrose Oral Gel 15 Gram(s) Oral once PRN  glucagon  Injectable 1 milliGRAM(s) IntraMuscular once  guaiFENesin ER 1200 milliGRAM(s) Oral every 12 hours  heparin   Injectable 5000 Unit(s) SubCutaneous every 8 hours  influenza  Vaccine (HIGH DOSE) 0.7 milliLiter(s) IntraMuscular once  insulin glargine Injectable (LANTUS) 5 Unit(s) SubCutaneous at bedtime  insulin lispro (ADMELOG) corrective regimen sliding scale   SubCutaneous three times a day before meals  melatonin 3 milliGRAM(s) Oral at bedtime PRN  ondansetron Injectable 4 milliGRAM(s) IV Push every 8 hours PRN  potassium chloride    Tablet ER 40 milliEquivalent(s) Oral once  remdesivir  IVPB 100 milliGRAM(s) IV Intermittent every 24 hours  remdesivir  IVPB   IV Intermittent   traZODone 150 milliGRAM(s) Oral at bedtime    Antimicrobials:  remdesivir  IVPB 100 milliGRAM(s) IV Intermittent every 24 hours  remdesivir  IVPB   IV Intermittent   
[facility], Division of Infectious Diseases  SHOSHANA Chang Y. Patel, S. Shah, G. Michael  [phone]  ([phone] - weekdays after 5pm and weekends)    Name: CECELIA TOPETE  Age/Gender: 77y Female  MRN: [id number]    Interval History:  Patient seen this morning.   Notes reviewed.   Afebrile.   reports she was not able to sleep well last night  states vertigo is improving    Allergies: HMG-CoA reductase inhibitors (Unknown)      Objective:  Vitals:   T(F): 98.2 (11-22-22 @ 06:00), Max: 98.5 (11-21-22 @ 23:30)  HR: 97 (11-22-22 @ 06:00) (67 - 97)  BP: 126/74 (11-22-22 @ 06:00) (108/68 - 126/74)  RR: 18 (11-22-22 @ 06:00) (18 - 18)  SpO2: 99% (11-22-22 @ 06:00) (97% - 99%)  Physical Examination:  General: no acute distress  HEENT: anicteric  Cardio: S1, S2, normal rate  Resp: clear to auscultation bilaterally  Abd: soft, NT, ND  Ext: no LE edema  Skin: warm, dry    Laboratory Studies:  CBC:                       9.9    14.34 )-----------( 429      ( 22 Nov 2022 04:31 )             31.0     WBC Trend:  14.34 11-22-22 @ 04:31  15.59 11-21-22 @ 05:30  13.72 11-20-22 @ 05:40  11.33 11-19-22 @ 05:13  11.02 11-18-22 @ 06:49  9.08 11-17-22 @ 06:43  11.56 11-16-22 @ 06:17    CMP: 11-22    135  |  101  |  41<H>  ----------------------------<  237<H>  4.6   |  20<L>  |  1.31<H>    Ca    9.3      22 Nov 2022 04:31  Phos  4.1     11-22  Mg     1.80     11-22    TPro  6.6  /  Alb  3.4  /  TBili  0.2  /  DBili  <0.2  /  AST  15  /  ALT  23  /  AlkPhos  78  11-22      LIVER FUNCTIONS - ( 22 Nov 2022 04:31 )  Alb: 3.4 g/dL / Pro: 6.6 g/dL / ALK PHOS: 78 U/L / ALT: 23 U/L / AST: 15 U/L / GGT: x               Microbiology: reviewed     Culture - Blood (collected 11-16-22 @ 09:40)  Source: .Blood Blood-Peripheral  Final Report (11-21-22 @ 13:00):    No Growth Final    Culture - Blood (collected 11-16-22 @ 09:20)  Source: .Blood Blood-Peripheral  Final Report (11-21-22 @ 16:00):    No Growth Final    Culture - Urine (collected 11-14-22 @ 01:13)  Source: Clean Catch Clean Catch (Midstream)  Final Report (11-15-22 @ 08:36):    >=3 organisms. Probable collection contamination.        Radiology: reviewed     Medications:  acetaminophen     Tablet .. 650 milliGRAM(s) Oral every 6 hours PRN  albuterol    90 MICROgram(s) HFA Inhaler 2 Puff(s) Inhalation every 6 hours PRN  aluminum hydroxide/magnesium hydroxide/simethicone Suspension 30 milliLiter(s) Oral every 4 hours PRN  aspirin enteric coated 81 milliGRAM(s) Oral daily  benzocaine 15 mG/menthol 3.6 mG Lozenge 1 Lozenge Oral every 4 hours PRN  benzonatate 100 milliGRAM(s) Oral three times a day PRN  cholecalciferol 1000 Unit(s) Oral daily  dexAMETHasone  Injectable 6 milliGRAM(s) IV Push daily  dextrose 5%. 1000 milliLiter(s) IV Continuous <Continuous>  dextrose 5%. 1000 milliLiter(s) IV Continuous <Continuous>  dextrose 50% Injectable 25 Gram(s) IV Push once  dextrose 50% Injectable 12.5 Gram(s) IV Push once  dextrose 50% Injectable 25 Gram(s) IV Push once  dextrose Oral Gel 15 Gram(s) Oral once PRN  glucagon  Injectable 1 milliGRAM(s) IntraMuscular once  guaiFENesin ER 1200 milliGRAM(s) Oral every 12 hours  heparin   Injectable 5000 Unit(s) SubCutaneous every 8 hours  influenza  Vaccine (HIGH DOSE) 0.7 milliLiter(s) IntraMuscular once  insulin glargine Injectable (LANTUS) 12 Unit(s) SubCutaneous at bedtime  insulin lispro (ADMELOG) corrective regimen sliding scale   SubCutaneous at bedtime  insulin lispro (ADMELOG) corrective regimen sliding scale   SubCutaneous three times a day before meals  insulin lispro Injectable (ADMELOG) 8 Unit(s) SubCutaneous three times a day before meals  lactated ringers. 1000 milliLiter(s) IV Continuous <Continuous>  melatonin 3 milliGRAM(s) Oral at bedtime PRN  ondansetron Injectable 4 milliGRAM(s) IV Push every 8 hours PRN  petrolatum white Ointment 1 Application(s) Topical daily  potassium chloride    Tablet ER 40 milliEquivalent(s) Oral once  traZODone 150 milliGRAM(s) Oral at bedtime    Antimicrobials:  
Sitting in chair  Minimal cough  No SOB  Saturating well on RA    Vital Signs Last 24 Hrs  T(C): 36.5 (22 Nov 2022 11:12), Max: 36.9 (21 Nov 2022 23:30)  T(F): 97.7 (22 Nov 2022 11:12), Max: 98.5 (21 Nov 2022 23:30)  HR: 75 (22 Nov 2022 11:12) (67 - 97)  BP: 121/54 (22 Nov 2022 11:12) (119/66 - 126/74)  BP(mean): --  RR: 18 (22 Nov 2022 11:12) (18 - 18)  SpO2: 98% (22 Nov 2022 11:12) (97% - 99%)    I&O's Summary    11-21-22 @ 07:01  -  11-22-22 @ 07:00  --------------------------------------------------------  IN: 0 mL / OUT: 1100 mL / NET: -1100 mL        PHYSICAL EXAM:  GENERAL: NAD, well-developed, comfortable on nasal canula   HEAD:  Atraumatic, Normocephalic  EYES: EOMI, PERRLA, conjunctiva and sclera clear  NECK: Supple, No JVD  CHEST/LUNG: mild decrease breath sounds bilaterally; No wheeze   HEART: Regular rate and rhythm; No murmurs, rubs, or gallops  ABDOMEN: Soft, Nontender, Nondistended; Bowel sounds present  Neuro: AAOx3, no focal weakness, 5/5 b/l extremity strength  EXTREMITIES:  2+ Peripheral Pulses, No clubbing, cyanosis, or edema    LABS:                        9.9    14.34 )-----------( 429      ( 22 Nov 2022 04:31 )             31.0     11-22    135  |  101  |  41<H>  ----------------------------<  237<H>  4.6   |  20<L>  |  1.31<H>    Ca    9.3      22 Nov 2022 04:31  Phos  4.1     11-22  Mg     1.80     11-22    TPro  6.6  /  Alb  3.4  /  TBili  0.2  /  DBili  <0.2  /  AST  15  /  ALT  23  /  AlkPhos  78  11-22      CAPILLARY BLOOD GLUCOSE      POCT Blood Glucose.: 268 mg/dL (22 Nov 2022 08:59)  POCT Blood Glucose.: 338 mg/dL (21 Nov 2022 22:58)  POCT Blood Glucose.: 325 mg/dL (21 Nov 2022 18:09)  POCT Blood Glucose.: 394 mg/dL (21 Nov 2022 13:14)  POCT Blood Glucose.: 419 mg/dL (21 Nov 2022 13:07)    CARDIAC MARKERS ( 21 Nov 2022 05:30 )  x     / x     / 75 U/L / x     / x              RADIOLOGY & ADDITIONAL TESTS:    Imaging Personally Reviewed:  [x] YES  [ ] NO    Will obtain old records:  [ ] YES  [x] NO
There was mild hemoptysis yesterday  none today  (+)sore throat  (+)vertigo when standing up    Vital Signs Last 24 Hrs  T(C): 36.7 (18 Nov 2022 06:00), Max: 36.7 (17 Nov 2022 11:31)  T(F): 98 (18 Nov 2022 06:00), Max: 98 (17 Nov 2022 11:31)  HR: 81 (18 Nov 2022 06:00) (81 - 99)  BP: 142/61 (18 Nov 2022 06:00) (116/62 - 150/74)  BP(mean): --  RR: 18 (18 Nov 2022 06:00) (18 - 20)  SpO2: 100% (18 Nov 2022 06:00) (95% - 100%)    I&O's Summary      PHYSICAL EXAM:  GENERAL: NAD, well-developed, comfortable on nasal canula   HEAD:  Atraumatic, Normocephalic  EYES: EOMI, PERRLA, conjunctiva and sclera clear  NECK: Supple, No JVD  CHEST/LUNG: mild decrease breath sounds bilaterally; No wheeze   HEART: Regular rate and rhythm; No murmurs, rubs, or gallops  ABDOMEN: Soft, Nontender, Nondistended; Bowel sounds present  Neuro: AAOx3, no focal weakness, 5/5 b/l extremity strength  EXTREMITIES:  2+ Peripheral Pulses, No clubbing, cyanosis, or edema    LABS:                        10.7   11.02 )-----------( 345      ( 18 Nov 2022 06:49 )             33.5     11-18    136  |  105  |  40<H>  ----------------------------<  206<H>  4.4   |  18<L>  |  1.11    Ca    9.5      18 Nov 2022 06:49  Phos  3.0     11-18  Mg     1.50     11-18    TPro  6.7  /  Alb  3.2<L>  /  TBili  0.3  /  DBili  <0.2  /  AST  29  /  ALT  32  /  AlkPhos  83  11-18      CAPILLARY BLOOD GLUCOSE      POCT Blood Glucose.: 205 mg/dL (18 Nov 2022 09:15)  POCT Blood Glucose.: 308 mg/dL (17 Nov 2022 22:41)  POCT Blood Glucose.: 300 mg/dL (17 Nov 2022 18:03)  POCT Blood Glucose.: 459 mg/dL (17 Nov 2022 12:05)  POCT Blood Glucose.: 441 mg/dL (17 Nov 2022 12:04)            RADIOLOGY & ADDITIONAL TESTS:    Imaging Personally Reviewed:  [x] YES  [ ] NO    Will obtain old records:  [ ] YES  [x] NO
Cardiology Progress Note    Interval Events:  clinically stable  remains tachycardic with SOB and requiring intermittent NC      MEDICATIONS:  aspirin enteric coated 81 milliGRAM(s) Oral daily  heparin   Injectable 6000 Unit(s) IV Push every 6 hours PRN  heparin  Infusion.  Unit(s)/Hr IV Continuous <Continuous>  remdesivir  IVPB 100 milliGRAM(s) IV Intermittent every 24 hours  remdesivir  IVPB   IV Intermittent   albuterol/ipratropium for Nebulization 3 milliLiter(s) Nebulizer every 6 hours  guaiFENesin ER 1200 milliGRAM(s) Oral every 12 hours  acetaminophen     Tablet .. 650 milliGRAM(s) Oral every 6 hours PRN  melatonin 3 milliGRAM(s) Oral at bedtime PRN  ondansetron Injectable 4 milliGRAM(s) IV Push every 8 hours PRN  traZODone 150 milliGRAM(s) Oral at bedtime  aluminum hydroxide/magnesium hydroxide/simethicone Suspension 30 milliLiter(s) Oral every 4 hours PRN  dexAMETHasone  Injectable 6 milliGRAM(s) IV Push daily  glucagon  Injectable 1 milliGRAM(s) IntraMuscular once  insulin glargine Injectable (LANTUS) 5 Unit(s) SubCutaneous at bedtime  insulin lispro (ADMELOG) corrective regimen sliding scale   SubCutaneous three times a day before meals  cholecalciferol 1000 Unit(s) Oral daily  dextrose 5%. 1000 milliLiter(s) IV Continuous <Continuous>  dextrose 5%. 1000 milliLiter(s) IV Continuous <Continuous>  influenza  Vaccine (HIGH DOSE) 0.7 milliLiter(s) IntraMuscular once  potassium chloride    Tablet ER 40 milliEquivalent(s) Oral once    PHYSICAL EXAM:  T(C): 36.8 (11-15-22 @ 09:03), Max: 37.3 (11-14-22 @ 15:40)  HR: 121 (11-15-22 @ 09:03) (95 - 121)  BP: 125/64 (11-15-22 @ 09:03) (120/60 - 140/59)  RR: 20 (11-15-22 @ 09:03) (18 - 22)  SpO2: 99% (11-15-22 @ 09:03) (99% - 100%)  Wt(kg): --  I&O's Summary    Appearance: No acute distress  HEENT:   mmm  Cardiovascular: Normal S1 S2, tachycardic, no elevated JVP, no edema  Respiratory: Lungs clear to auscultation	, good air movement  Psychiatry: A & O x 3, Mood & affect appropriate  Gastrointestinal:  soft nt nd normoactive bs	  Skin: No rashes, no ecchymoses, no cyanosis	  Neurologic: grossly non-focal  Extremities: Normal range of motion, no clubbing, cyanosis or edema  Vascular: Peripheral pulses palpable bilaterally    LABS:	 	  CBC Full  -  ( 15 Nov 2022 06:48 )  WBC Count : 11.46 K/uL  Hemoglobin : 10.5 g/dL  Hematocrit : 34.5 %  Platelet Count - Automated : 251 K/uL      11-15    140  |  105  |  20  ----------------------------<  165<H>  3.4<L>   |  19<L>  |  1.10  11-15    x   |  x   |  x   ----------------------------<  x   x    |  x   |  1.09    Ca    9.1      15 Nov 2022 06:48  Ca    9.1      14 Nov 2022 11:37  Phos  3.0     11-14  Mg     1.60     11-14    TPro  7.6  /  Alb  3.7  /  TBili  0.2  /  DBili  x   /  AST  54<H>  /  ALT  28  /  AlkPhos  97  11-15  TPro  6.6  /  Alb  3.5  /  TBili  0.2  /  DBili  <0.2  /  AST  43<H>  /  ALT  25  /  AlkPhos  86  11-15      proBNP: Serum Pro-Brain Natriuretic Peptide: 2689 pg/mL (11-14 @ 03:02)    Lipid Profile:   HgA1c:   TSH: Thyroid Stimulating Hormone, Serum: 0.71 uIU/mL (11-15 @ 06:48)      CARDIAC MARKERS:  Troponin T, High Sensitivity Result: 143: PREVIOUSLY CALLED  Rapid changes upward or downward in high-sensitivity troponin levels  strongly suggest acute myocardial injury. Hemolysis may falsely lower  results. Renal impairment may increase results.  Normal: <6 - 14 ng/L  Indeterminate: 15 -51 ng/L  Elevated: >51 ng/L  Please see "[email or web address] on the [facility] intranet for  more information. ng/L (11.14.22 @ 11:37)    TELEMETRY: 	  ST
Date of Service: 11-15-22 @ 13:58    Patient is a 77y old  Female who presents with a chief complaint of COVID, weakness (15 Nov 2022 11:37)      Any change in ROS:  she is on room air : feels much better: she was on 2 L before:    MEDICATIONS  (STANDING):  albuterol/ipratropium for Nebulization 3 milliLiter(s) Nebulizer every 6 hours  aspirin enteric coated 81 milliGRAM(s) Oral daily  cholecalciferol 1000 Unit(s) Oral daily  dexAMETHasone  Injectable 6 milliGRAM(s) IV Push daily  dextrose 5%. 1000 milliLiter(s) (100 mL/Hr) IV Continuous <Continuous>  dextrose 5%. 1000 milliLiter(s) (50 mL/Hr) IV Continuous <Continuous>  dextrose 50% Injectable 25 Gram(s) IV Push once  dextrose 50% Injectable 12.5 Gram(s) IV Push once  dextrose 50% Injectable 25 Gram(s) IV Push once  glucagon  Injectable 1 milliGRAM(s) IntraMuscular once  guaiFENesin ER 1200 milliGRAM(s) Oral every 12 hours  influenza  Vaccine (HIGH DOSE) 0.7 milliLiter(s) IntraMuscular once  insulin glargine Injectable (LANTUS) 5 Unit(s) SubCutaneous at bedtime  insulin lispro (ADMELOG) corrective regimen sliding scale   SubCutaneous three times a day before meals  potassium chloride    Tablet ER 40 milliEquivalent(s) Oral once  remdesivir  IVPB 100 milliGRAM(s) IV Intermittent every 24 hours  remdesivir  IVPB   IV Intermittent   traZODone 150 milliGRAM(s) Oral at bedtime    MEDICATIONS  (PRN):  acetaminophen     Tablet .. 650 milliGRAM(s) Oral every 6 hours PRN Temp greater or equal to 38C (100.4F), Mild Pain (1 - 3)  aluminum hydroxide/magnesium hydroxide/simethicone Suspension 30 milliLiter(s) Oral every 4 hours PRN Dyspepsia  benzocaine 15 mG/menthol 3.6 mG Lozenge 1 Lozenge Oral every 4 hours PRN Sore Throat  dextrose Oral Gel 15 Gram(s) Oral once PRN Blood Glucose LESS THAN 70 milliGRAM(s)/deciliter  melatonin 3 milliGRAM(s) Oral at bedtime PRN Insomnia  ondansetron Injectable 4 milliGRAM(s) IV Push every 8 hours PRN Nausea and/or Vomiting    Vital Signs Last 24 Hrs  T(C): 36.8 (15 Nov 2022 09:03), Max: 37.3 (2022 15:40)  T(F): 98.3 (15 Nov 2022 09:03), Max: 99.1 (2022 15:40)  HR: 121 (15 Nov 2022 09:03) (95 - 121)  BP: 125/64 (15 Nov 2022 09:03) (120/60 - 140/59)  BP(mean): --  RR: 20 (15 Nov 2022 09:03) (18 - 22)  SpO2: 99% (15 Nov 2022 09:03) (99% - 100%)    Parameters below as of 15 Nov 2022 09:  Patient On (Oxygen Delivery Method): room air        I&O's Summary        Physical Exam:   GENERAL: NAD, well-groomed, well-developed  HEENT: TABITHA/   Atraumatic, Normocephalic  ENMT: No tonsillar erythema, exudates, or enlargement; Moist mucous membranes, Good dentition, No lesions  NECK: Supple, No JVD, Normal thyroid  CHEST/LUNG: Clear to auscultaion  CVS: Regular rate and rhythm; No murmurs, rubs, or gallops  GI: : Soft, Nontender, Nondistended; Bowel sounds present  NERVOUS SYSTEM:  Alert & Oriented X3  EXTREMITIES:  2+ Peripheral Pulses, No clubbing, cyanosis, or edema  LYMPH: No lymphadenopathy noted  SKIN: No rashes or lesions  ENDOCRINOLOGY: No Thyromegaly  PSYCH: Appropriate    Labs:    CARDIAC MARKERS ( 2022 11:37 )  x     / x     / 686 U/L / x     / x      CARDIAC MARKERS ( 2022 03:02 )  x     / x     / 329 U/L / x     / 4.6 ng/mL                            10.5   11.46 )-----------( 251      ( 15 Nov 2022 06:48 )             34.5                         10.4   10.47 )-----------( 243      ( 2022 11:37 )             32.1                         10.9   12.03 )-----------( 280      ( 2022 00:35 )             34.7     11-15    140  |  105  |  20  ----------------------------<  165<H>  3.4<L>   |  19<L>  |  1.10  11-15    x   |  x   |  x   ----------------------------<  x   x    |  x   |  1.09  -14    x   |  x   |  x   ----------------------------<  x   x    |  x   |  1.17  14    133<L>  |  101  |  20  ----------------------------<  311<H>  3.7   |  16<L>  |  1.22      135  |  100  |  20  ----------------------------<  146<H>  3.9   |  22  |  1.37<H>    Ca    9.1      15 Nov 2022 06:48  Ca    9.1      2022 11:37  Ca    9.8      2022 00:35  Phos  3.0     -14  Mg     1.60     -14    TPro  7.6  /  Alb  3.7  /  TBili  0.2  /  DBili  x   /  AST  54<H>  /  ALT  28  /  AlkPhos  97  11-15  TPro  6.6  /  Alb  3.5  /  TBili  0.2  /  DBili  <0.2  /  AST  43<H>  /  ALT  25  /  AlkPhos  86  11-15  TPro  7.6  /  Alb  3.8  /  TBili  0.3  /  DBili  <0.2  /  AST  44<H>  /  ALT  25  /  AlkPhos  97  -14  TPro  7.7  /  Alb  4.1  /  TBili  0.3  /  DBili  x   /  AST  27  /  ALT  19  /  AlkPhos  100  -14    CAPILLARY BLOOD GLUCOSE      POCT Blood Glucose.: 255 mg/dL (15 Nov 2022 12:43)  POCT Blood Glucose.: 132 mg/dL (15 Nov 2022 09:03)  POCT Blood Glucose.: 281 mg/dL (2022 22:22)  POCT Blood Glucose.: 264 mg/dL (2022 17:52)      LIVER FUNCTIONS - ( 15 Nov 2022 06:48 )  Alb: 3.7 g/dL / Pro: 7.6 g/dL / ALK PHOS: 97 U/L / ALT: 28 U/L / AST: 54 U/L / GGT: x           PT/INR - ( 15 Nov 2022 01:05 )   PT: 14.4 sec;   INR: 1.24 ratio         PTT - ( 15 Nov 2022 01:05 )  PTT:66.4 sec  Urinalysis Basic - ( 2022 01:10 )    Color: Yellow / Appearance: Turbid / S.018 / pH: x  Gluc: x / Ketone: Negative  / Bili: Negative / Urobili: <2 mg/dL   Blood: x / Protein: 30 mg/dL / Nitrite: Negative   Leuk Esterase: Large / RBC: 10 /HPF / WBC 30 /HPF   Sq Epi: x / Non Sq Epi: 12 /HPF / Bacteria: Few      D-Dimer Assay, Quantitative: 416 ng/mL DDU ( @ 14:24)  Serum Pro-Brain Natriuretic Peptide: 2689 pg/mL ( @ 03:02)        RECENT CULTURES:   @ 01:13 Clean Catch Clean Catch (Midstream)         ra< from: Xray Chest 1 View- PORTABLE-Urgent (Xray Chest 1 View- PORTABLE-Urgent .) (11..22 @ 01:17) >  PROCEDURE DATE:  2022          INTERPRETATION:  CLINICAL INFORMATION: Chest pain, cough.    TECHNIQUE: Single portable view of the chest.    COMPARISON: None available..    FINDINGS:    No focal consolidation.  No pneumothorax or large pleural effusion.  Heart size cannot be accurately assessed in this projection.  No acute osseous abnormality.    IMPRESSION:    No focal consolidation.    --- End of Report ---          ANTWAN BHAGAT MD; Resident Radiologist  This document has been electronically signed.  KARAN ROACH MD; Attending Radiologist  This document has been electronically signed. 2022  5:42AM    < end of copied text >         >=3 organisms. Probable collection contamination.          RESPIRATORY CULTURES:          Studies  Chest X-RAY  CT SCAN Chest   Venous Dopplers: LE:   CT Abdomen  Others              
Date of Service: 11-17-22 @ 15:24    Patient is a 77y old  Female who presents with a chief complaint of COVID, weakness (15 Nov 2022 11:37)      Any change in ROS: doing ok : no sob: on room air:  dark blood tiny in her sputum     MEDICATIONS  (STANDING):  aspirin enteric coated 81 milliGRAM(s) Oral daily  cholecalciferol 1000 Unit(s) Oral daily  dexAMETHasone  Injectable 6 milliGRAM(s) IV Push daily  dextrose 5%. 1000 milliLiter(s) (50 mL/Hr) IV Continuous <Continuous>  dextrose 5%. 1000 milliLiter(s) (100 mL/Hr) IV Continuous <Continuous>  dextrose 50% Injectable 25 Gram(s) IV Push once  dextrose 50% Injectable 12.5 Gram(s) IV Push once  dextrose 50% Injectable 25 Gram(s) IV Push once  glucagon  Injectable 1 milliGRAM(s) IntraMuscular once  guaiFENesin ER 1200 milliGRAM(s) Oral every 12 hours  heparin   Injectable 5000 Unit(s) SubCutaneous every 8 hours  influenza  Vaccine (HIGH DOSE) 0.7 milliLiter(s) IntraMuscular once  insulin glargine Injectable (LANTUS) 8 Unit(s) SubCutaneous at bedtime  insulin lispro (ADMELOG) corrective regimen sliding scale   SubCutaneous three times a day before meals  insulin lispro Injectable (ADMELOG) 5 Unit(s) SubCutaneous three times a day before meals  petrolatum white Ointment 1 Application(s) Topical daily  potassium chloride    Tablet ER 40 milliEquivalent(s) Oral once  remdesivir  IVPB   IV Intermittent   remdesivir  IVPB 100 milliGRAM(s) IV Intermittent every 24 hours  traZODone 150 milliGRAM(s) Oral at bedtime    MEDICATIONS  (PRN):  acetaminophen     Tablet .. 650 milliGRAM(s) Oral every 6 hours PRN Temp greater or equal to 38C (100.4F), Mild Pain (1 - 3)  albuterol    90 MICROgram(s) HFA Inhaler 2 Puff(s) Inhalation every 6 hours PRN Shortness of Breath and/or Wheezing  aluminum hydroxide/magnesium hydroxide/simethicone Suspension 30 milliLiter(s) Oral every 4 hours PRN Dyspepsia  benzocaine 15 mG/menthol 3.6 mG Lozenge 1 Lozenge Oral every 4 hours PRN Sore Throat  benzonatate 100 milliGRAM(s) Oral three times a day PRN Cough  dextrose Oral Gel 15 Gram(s) Oral once PRN Blood Glucose LESS THAN 70 milliGRAM(s)/deciliter  melatonin 3 milliGRAM(s) Oral at bedtime PRN Insomnia  ondansetron Injectable 4 milliGRAM(s) IV Push every 8 hours PRN Nausea and/or Vomiting    Vital Signs Last 24 Hrs  T(C): 36.7 (17 Nov 2022 13:17), Max: 37 (17 Nov 2022 05:23)  T(F): 98 (17 Nov 2022 13:17), Max: 98.6 (17 Nov 2022 05:23)  HR: 92 (17 Nov 2022 13:17) (89 - 99)  BP: 127/82 (17 Nov 2022 13:17) (114/63 - 130/88)  BP(mean): --  RR: 20 (17 Nov 2022 13:17) (18 - 20)  SpO2: 98% (17 Nov 2022 13:17) (95% - 98%)    Parameters below as of 17 Nov 2022 13:17  Patient On (Oxygen Delivery Method): room air        I&O's Summary    16 Nov 2022 07:01  -  17 Nov 2022 07:00  --------------------------------------------------------  IN: 200 mL / OUT: 800 mL / NET: -600 mL          Physical Exam:   GENERAL: NAD, well-groomed, well-developed  HEENT: TABITHA/   Atraumatic, Normocephalic  ENMT: No tonsillar erythema, exudates, or enlargement; Moist mucous membranes, Good dentition, No lesions  NECK: Supple, No JVD, Normal thyroid  CHEST/LUNG: Clear to auscultaion  CVS: Regular rate and rhythm; No murmurs, rubs, or gallops  GI: : Soft, Nontender, Nondistended; Bowel sounds present  NERVOUS SYSTEM:  Alert & Oriented X3  EXTREMITIES:  - edema  LYMPH: No lymphadenopathy noted  SKIN: No rashes or lesions  ENDOCRINOLOGY: No Thyromegaly  PSYCH: Appropriate    Labs:                              10.2   9.08  )-----------( 284      ( 17 Nov 2022 06:43 )             31.2                         10.5   11.56 )-----------( 265      ( 16 Nov 2022 06:17 )             32.9                         10.5   11.46 )-----------( 251      ( 15 Nov 2022 06:48 )             34.5                         10.4   10.47 )-----------( 243      ( 14 Nov 2022 11:37 )             32.1                         10.9   12.03 )-----------( 280      ( 14 Nov 2022 00:35 )             34.7     11-17    136  |  102  |  34<H>  ----------------------------<  202<H>  4.0   |  21<L>  |  1.22  11-16    137  |  101  |  24<H>  ----------------------------<  191<H>  3.6   |  20<L>  |  1.15  11-15    140  |  105  |  20  ----------------------------<  165<H>  3.4<L>   |  19<L>  |  1.10  11-15    x   |  x   |  x   ----------------------------<  x   x    |  x   |  1.09  11-14    x   |  x   |  x   ----------------------------<  x   x    |  x   |  1.17  11-14    133<L>  |  101  |  20  ----------------------------<  311<H>  3.7   |  16<L>  |  1.22  11-14    135  |  100  |  20  ----------------------------<  146<H>  3.9   |  22  |  1.37<H>    Ca    9.2      17 Nov 2022 06:43  Ca    9.2      16 Nov 2022 06:17    TPro  6.6  /  Alb  3.2<L>  /  TBili  0.4  /  DBili  <0.2  /  AST  47<H>  /  ALT  35<H>  /  AlkPhos  82  11-17  TPro  6.9  /  Alb  3.5  /  TBili  0.5  /  DBili  x   /  AST  69<H>  /  ALT  35<H>  /  AlkPhos  90  11-16  TPro  7.6  /  Alb  3.7  /  TBili  0.2  /  DBili  x   /  AST  54<H>  /  ALT  28  /  AlkPhos  97  11-15  TPro  6.6  /  Alb  3.5  /  TBili  0.2  /  DBili  <0.2  /  AST  43<H>  /  ALT  25  /  AlkPhos  86  11-15  TPro  7.6  /  Alb  3.8  /  TBili  0.3  /  DBili  <0.2  /  AST  44<H>  /  ALT  25  /  AlkPhos  97  11-14  TPro  7.7  /  Alb  4.1  /  TBili  0.3  /  DBili  x   /  AST  27  /  ALT  19  /  AlkPhos  100  11-14    CAPILLARY BLOOD GLUCOSE      POCT Blood Glucose.: 459 mg/dL (17 Nov 2022 12:05)  POCT Blood Glucose.: 441 mg/dL (17 Nov 2022 12:04)  POCT Blood Glucose.: 279 mg/dL (17 Nov 2022 08:58)  POCT Blood Glucose.: 299 mg/dL (16 Nov 2022 21:24)  POCT Blood Glucose.: 322 mg/dL (16 Nov 2022 17:48)      LIVER FUNCTIONS - ( 17 Nov 2022 06:43 )  Alb: 3.2 g/dL / Pro: 6.6 g/dL / ALK PHOS: 82 U/L / ALT: 35 U/L / AST: 47 U/L / GGT: x               D-Dimer Assay, Quantitative: 416 ng/mL DDU (11-14 @ 14:24)        RECENT CULTURES:  11-16 @ 09:40 .Blood Blood-Peripheral         rad< from: Xray Chest 1 View- PORTABLE-Urgent (Xray Chest 1 View- PORTABLE-Urgent .) (11.14.22 @ 01:17) >  COMPARISON: None available..    FINDINGS:    No focal consolidation.  No pneumothorax or large pleural effusion.  Heart size cannot be accurately assessed in this projection.  No acute osseous abnormality.    IMPRESSION:    No focal consolidation.    --- End of Report ---          ANTWAN BHAGAT MD; Resident Radiologist  This document has been electronically signed.  KARAN ROACH MD; Attending Radiologist  This document has been electronically signed. Nov 14 2022  5:42AM    < end of copied text >         No growth to date.    11-14 @ 01:13 Clean Catch Clean Catch (Midstream)                >=3 organisms. Probable collection contamination.          RESPIRATORY CULTURES:          Studies  Chest X-RAY  CT SCAN Chest   Venous Dopplers: LE:   CT Abdomen  Others              
Date of Service: 11-18-22 @ 15:25    Patient is a 77y old  Female who presents with a chief complaint of COVID, weakness (15 Nov 2022 11:37)      Any change in ROS: seems to be doing  ok : no sob: no cough :       MEDICATIONS  (STANDING):  aspirin enteric coated 81 milliGRAM(s) Oral daily  cholecalciferol 1000 Unit(s) Oral daily  dexAMETHasone  Injectable 6 milliGRAM(s) IV Push daily  dextrose 5%. 1000 milliLiter(s) (100 mL/Hr) IV Continuous <Continuous>  dextrose 5%. 1000 milliLiter(s) (50 mL/Hr) IV Continuous <Continuous>  dextrose 50% Injectable 25 Gram(s) IV Push once  dextrose 50% Injectable 12.5 Gram(s) IV Push once  dextrose 50% Injectable 25 Gram(s) IV Push once  glucagon  Injectable 1 milliGRAM(s) IntraMuscular once  guaiFENesin ER 1200 milliGRAM(s) Oral every 12 hours  heparin   Injectable 5000 Unit(s) SubCutaneous every 8 hours  influenza  Vaccine (HIGH DOSE) 0.7 milliLiter(s) IntraMuscular once  insulin glargine Injectable (LANTUS) 8 Unit(s) SubCutaneous at bedtime  insulin lispro (ADMELOG) corrective regimen sliding scale   SubCutaneous three times a day before meals  insulin lispro Injectable (ADMELOG) 5 Unit(s) SubCutaneous three times a day before meals  petrolatum white Ointment 1 Application(s) Topical daily  potassium chloride    Tablet ER 40 milliEquivalent(s) Oral once  traZODone 150 milliGRAM(s) Oral at bedtime    MEDICATIONS  (PRN):  acetaminophen     Tablet .. 650 milliGRAM(s) Oral every 6 hours PRN Temp greater or equal to 38C (100.4F), Mild Pain (1 - 3)  albuterol    90 MICROgram(s) HFA Inhaler 2 Puff(s) Inhalation every 6 hours PRN Shortness of Breath and/or Wheezing  aluminum hydroxide/magnesium hydroxide/simethicone Suspension 30 milliLiter(s) Oral every 4 hours PRN Dyspepsia  benzocaine 15 mG/menthol 3.6 mG Lozenge 1 Lozenge Oral every 4 hours PRN Sore Throat  benzonatate 100 milliGRAM(s) Oral three times a day PRN Cough  dextrose Oral Gel 15 Gram(s) Oral once PRN Blood Glucose LESS THAN 70 milliGRAM(s)/deciliter  melatonin 3 milliGRAM(s) Oral at bedtime PRN Insomnia  ondansetron Injectable 4 milliGRAM(s) IV Push every 8 hours PRN Nausea and/or Vomiting    Vital Signs Last 24 Hrs  T(C): 36.6 (18 Nov 2022 13:05), Max: 36.7 (17 Nov 2022 22:10)  T(F): 97.9 (18 Nov 2022 13:05), Max: 98 (17 Nov 2022 22:10)  HR: 77 (18 Nov 2022 13:05) (77 - 90)  BP: 133/87 (18 Nov 2022 13:05) (116/62 - 150/74)  BP(mean): --  RR: 16 (18 Nov 2022 13:05) (16 - 20)  SpO2: 96% (18 Nov 2022 13:05) (96% - 100%)    Parameters below as of 18 Nov 2022 13:05  Patient On (Oxygen Delivery Method): room air        I&O's Summary        Physical Exam:   GENERAL: NAD, well-groomed, well-developed  HEENT: TABITHA/   Atraumatic, Normocephalic  ENMT: No tonsillar erythema, exudates, or enlargement; Moist mucous membranes, Good dentition, No lesions  NECK: Supple, No JVD, Normal thyroid  CHEST/LUNG: Clear to auscultaion  CVS: Regular rate and rhythm; No murmurs, rubs, or gallops  GI: : Soft, Nontender, Nondistended; Bowel sounds present  NERVOUS SYSTEM:  Alert & Oriented X3  EXTREMITIES: - edema  LYMPH: No lymphadenopathy noted  SKIN: No rashes or lesions  ENDOCRINOLOGY: No Thyromegaly  PSYCH: Appropriate    Labs:                              10.7   11.02 )-----------( 345      ( 18 Nov 2022 06:49 )             33.5                         10.2   9.08  )-----------( 284      ( 17 Nov 2022 06:43 )             31.2                         10.5   11.56 )-----------( 265      ( 16 Nov 2022 06:17 )             32.9                         10.5   11.46 )-----------( 251      ( 15 Nov 2022 06:48 )             34.5     11-18    136  |  105  |  40<H>  ----------------------------<  206<H>  4.4   |  18<L>  |  1.11  11-17    136  |  102  |  34<H>  ----------------------------<  202<H>  4.0   |  21<L>  |  1.22  11-16    137  |  101  |  24<H>  ----------------------------<  191<H>  3.6   |  20<L>  |  1.15  11-15    140  |  105  |  20  ----------------------------<  165<H>  3.4<L>   |  19<L>  |  1.10  11-15    x   |  x   |  x   ----------------------------<  x   x    |  x   |  1.09    Ca    9.5      18 Nov 2022 06:49  Ca    9.2      17 Nov 2022 06:43  Phos  3.0     11-18  Mg     1.50     11-18    TPro  6.7  /  Alb  3.2<L>  /  TBili  0.3  /  DBili  <0.2  /  AST  29  /  ALT  32  /  AlkPhos  83  11-18  TPro  6.6  /  Alb  3.2<L>  /  TBili  0.4  /  DBili  <0.2  /  AST  47<H>  /  ALT  35<H>  /  AlkPhos  82  11-17  TPro  6.9  /  Alb  3.5  /  TBili  0.5  /  DBili  x   /  AST  69<H>  /  ALT  35<H>  /  AlkPhos  90  11-16  TPro  7.6  /  Alb  3.7  /  TBili  0.2  /  DBili  x   /  AST  54<H>  /  ALT  28  /  AlkPhos  97  11-15  TPro  6.6  /  Alb  3.5  /  TBili  0.2  /  DBili  <0.2  /  AST  43<H>  /  ALT  25  /  AlkPhos  86  11-15    CAPILLARY BLOOD GLUCOSE      POCT Blood Glucose.: 353 mg/dL (18 Nov 2022 12:46)  POCT Blood Glucose.: 205 mg/dL (18 Nov 2022 09:15)  POCT Blood Glucose.: 308 mg/dL (17 Nov 2022 22:41)  POCT Blood Glucose.: 300 mg/dL (17 Nov 2022 18:03)      LIVER FUNCTIONS - ( 18 Nov 2022 06:49 )  Alb: 3.2 g/dL / Pro: 6.7 g/dL / ALK PHOS: 83 U/L / ALT: 32 U/L / AST: 29 U/L / GGT: x               D-Dimer Assay, Quantitative: 410 ng/mL DDU (11-18 @ 06:49)  D-Dimer Assay, Quantitative: 416 ng/mL DDU (11-14 @ 14:24)        RECENT CULTURES:  11-16 @ 09:40 .Blood Blood-Peripheral         rad< from: Xray Chest 1 View- PORTABLE-Urgent (Xray Chest 1 View- PORTABLE-Urgent .) (11.14.22 @ 01:17) >    INTERPRETATION:  CLINICAL INFORMATION: Chest pain, cough.    TECHNIQUE: Single portable view of the chest.    COMPARISON: None available..    FINDINGS:    No focal consolidation.  No pneumothorax or large pleural effusion.  Heart size cannot be accurately assessed in this projection.  No acute osseous abnormality.    IMPRESSION:    No focal consolidation.    --- End of Report ---          ANTWAN BHAGAT MD; Resident Radiologist  This document has been electronically signed.  KARAN ROACH MD; Attending Radiologist  This document has been electronically signed. Nov 14 2022  5:42AM    < end of copied text >  < from: Xray Chest 1 view AP/PA- Port Urgent (02.07.09 @ 03:26) >    PROCEDURE DATE:  Feb 7 2009     .    INTERPRETATION:  HISTORY: Acute renal failure.    FINDINGS: Portable semiupright view of the chest dated 02/07/2009 is   compared to 02/05/2009. There is diffuse pulmonary edema. There is no   pleural effusion. Heart and mediastinum cannot be evaluated due to   patient rotation.        IMPRESSION:   Pulmonary edema.             SKYE JUAN M.D., ATTENDING RADIOLOGIST  This examination was interpreted on:  Feb 7 2009  1:18P.  This document   has been electronically signed. Feb 7 2009  1:18P.                < end of copied text >         No growth to date.    11-16 @ 09:20 .Blood Blood-Peripheral                No growth to date.    11-14 @ 01:13 Clean Catch Clean Catch (Midstream)                >=3 organisms. Probable collection contamination.          RESPIRATORY CULTURES:          Studies  Chest X-RAY  CT SCAN Chest   Venous Dopplers: LE:   CT Abdomen  Others              
Date of Service: 11-19-22 @ 13:56    Patient is a 77y old  Female who presents with a chief complaint of COVID, weakness (15 Nov 2022 11:37)      Any change in ROS: having vertigo :  no sob:  no cough :  no phlegm  ;       MEDICATIONS  (STANDING):  aspirin enteric coated 81 milliGRAM(s) Oral daily  cholecalciferol 1000 Unit(s) Oral daily  dexAMETHasone  Injectable 6 milliGRAM(s) IV Push daily  dextrose 5%. 1000 milliLiter(s) (100 mL/Hr) IV Continuous <Continuous>  dextrose 5%. 1000 milliLiter(s) (50 mL/Hr) IV Continuous <Continuous>  dextrose 50% Injectable 25 Gram(s) IV Push once  dextrose 50% Injectable 12.5 Gram(s) IV Push once  dextrose 50% Injectable 25 Gram(s) IV Push once  glucagon  Injectable 1 milliGRAM(s) IntraMuscular once  guaiFENesin ER 1200 milliGRAM(s) Oral every 12 hours  heparin   Injectable 5000 Unit(s) SubCutaneous every 8 hours  influenza  Vaccine (HIGH DOSE) 0.7 milliLiter(s) IntraMuscular once  insulin glargine Injectable (LANTUS) 8 Unit(s) SubCutaneous at bedtime  insulin lispro (ADMELOG) corrective regimen sliding scale   SubCutaneous at bedtime  insulin lispro (ADMELOG) corrective regimen sliding scale   SubCutaneous three times a day before meals  insulin lispro Injectable (ADMELOG) 5 Unit(s) SubCutaneous three times a day before meals  petrolatum white Ointment 1 Application(s) Topical daily  potassium chloride    Tablet ER 40 milliEquivalent(s) Oral once  traZODone 150 milliGRAM(s) Oral at bedtime    MEDICATIONS  (PRN):  acetaminophen     Tablet .. 650 milliGRAM(s) Oral every 6 hours PRN Temp greater or equal to 38C (100.4F), Mild Pain (1 - 3)  albuterol    90 MICROgram(s) HFA Inhaler 2 Puff(s) Inhalation every 6 hours PRN Shortness of Breath and/or Wheezing  aluminum hydroxide/magnesium hydroxide/simethicone Suspension 30 milliLiter(s) Oral every 4 hours PRN Dyspepsia  benzocaine 15 mG/menthol 3.6 mG Lozenge 1 Lozenge Oral every 4 hours PRN Sore Throat  benzonatate 100 milliGRAM(s) Oral three times a day PRN Cough  dextrose Oral Gel 15 Gram(s) Oral once PRN Blood Glucose LESS THAN 70 milliGRAM(s)/deciliter  melatonin 3 milliGRAM(s) Oral at bedtime PRN Insomnia  ondansetron Injectable 4 milliGRAM(s) IV Push every 8 hours PRN Nausea and/or Vomiting    Vital Signs Last 24 Hrs  T(C): 36.3 (19 Nov 2022 12:45), Max: 36.8 (19 Nov 2022 05:05)  T(F): 97.4 (19 Nov 2022 12:45), Max: 98.2 (19 Nov 2022 05:05)  HR: 62 (19 Nov 2022 12:45) (62 - 71)  BP: 118/50 (19 Nov 2022 12:45) (114/66 - 143/63)  BP(mean): --  RR: 17 (19 Nov 2022 12:45) (17 - 18)  SpO2: 99% (19 Nov 2022 12:45) (95% - 99%)    Parameters below as of 19 Nov 2022 12:45  Patient On (Oxygen Delivery Method): room air        I&O's Summary        Physical Exam:   GENERAL: NAD, well-groomed, well-developed  HEENT: TABITHA/   Atraumatic, Normocephalic  ENMT: No tonsillar erythema, exudates, or enlargement; Moist mucous membranes, Good dentition, No lesions  NECK: Supple, No JVD, Normal thyroid  CHEST/LUNG: Clear to auscultaion  CVS: Regular rate and rhythm; No murmurs, rubs, or gallops  GI: : Soft, Nontender, Nondistended; Bowel sounds present  NERVOUS SYSTEM:  Alert & Oriented X3  EXTREMITIES: -edema  LYMPH: No lymphadenopathy noted  SKIN: No rashes or lesions  ENDOCRINOLOGY: No Thyromegaly  PSYCH: Appropriate    Labs:                              9.5    11.33 )-----------( 366      ( 19 Nov 2022 05:13 )             29.7                         10.7   11.02 )-----------( 345      ( 18 Nov 2022 06:49 )             33.5                         10.2   9.08  )-----------( 284      ( 17 Nov 2022 06:43 )             31.2                         10.5   11.56 )-----------( 265      ( 16 Nov 2022 06:17 )             32.9     11-19    135  |  105  |  43<H>  ----------------------------<  212<H>  5.0   |  19<L>  |  1.19  11-18    136  |  105  |  40<H>  ----------------------------<  206<H>  4.4   |  18<L>  |  1.11  11-17    136  |  102  |  34<H>  ----------------------------<  202<H>  4.0   |  21<L>  |  1.22  11-16    137  |  101  |  24<H>  ----------------------------<  191<H>  3.6   |  20<L>  |  1.15    Ca    8.9      19 Nov 2022 05:13  Ca    9.5      18 Nov 2022 06:49  Phos  3.0     11-18  Mg     1.60     11-19  Mg     1.50     11-18    TPro  6.1  /  Alb  3.0<L>  /  TBili  0.2  /  DBili  <0.2  /  AST  42<H>  /  ALT  28  /  AlkPhos  75  11-19  TPro  6.7  /  Alb  3.2<L>  /  TBili  0.3  /  DBili  <0.2  /  AST  29  /  ALT  32  /  AlkPhos  83  11-18  TPro  6.6  /  Alb  3.2<L>  /  TBili  0.4  /  DBili  <0.2  /  AST  47<H>  /  ALT  35<H>  /  AlkPhos  82  11-17  TPro  6.9  /  Alb  3.5  /  TBili  0.5  /  DBili  x   /  AST  69<H>  /  ALT  35<H>  /  AlkPhos  90  11-16    CAPILLARY BLOOD GLUCOSE      POCT Blood Glucose.: 374 mg/dL (19 Nov 2022 12:51)  POCT Blood Glucose.: 280 mg/dL (19 Nov 2022 08:54)  POCT Blood Glucose.: 281 mg/dL (18 Nov 2022 22:39)  POCT Blood Glucose.: 305 mg/dL (18 Nov 2022 17:00)      LIVER FUNCTIONS - ( 19 Nov 2022 05:13 )  Alb: 3.0 g/dL / Pro: 6.1 g/dL / ALK PHOS: 75 U/L / ALT: 28 U/L / AST: 42 U/L / GGT: x               D-Dimer Assay, Quantitative: 410 ng/mL DDU (11-18 @ 06:49)  D-Dimer Assay, Quantitative: 416 ng/mL DDU (11-14 @ 14:24)        RECENT CULTURES:  11-16 @ 09:40 .Blood Blood-Peripheral         rad< from: Xray Chest 1 View- PORTABLE-Urgent (Xray Chest 1 View- PORTABLE-Urgent .) (11.14.22 @ 01:17) >  TERPRETATION:  CLINICAL INFORMATION: Chest pain, cough.    TECHNIQUE: Single portable view of the chest.    COMPARISON: None available..    FINDINGS:    No focal consolidation.  No pneumothorax or large pleural effusion.  Heart size cannot be accurately assessed in this projection.  No acute osseous abnormality.    IMPRESSION:    No focal consolidation.    --- End of Report ---          ANTWAN BHAGAT MD; Resident Radiologist  This document has been electronically signed.  KARAN ROACH MD; Attending Radiologist  This document has been electronically signed. Nov 14 2022  5:42AM    < end of copied text >         No growth to date.    11-16 @ 09:20 .Blood Blood-Peripheral                No growth to date.    11-14 @ 01:13 Clean Catch Clean Catch (Midstream)                >=3 organisms. Probable collection contamination.          RESPIRATORY CULTURES:          Studies  Chest X-RAY  CT SCAN Chest   Venous Dopplers: LE:   CT Abdomen  Others              
Date of Service: 11-20-22 @ 13:09    Patient is a 77y old  Female who presents with a chief complaint of COVID, weakness (15 Nov 2022 11:37)      Any change in ROS: she seems OK : no sob at rest:  on room air:  she does feel sob on exertion though : no chest pain :       MEDICATIONS  (STANDING):  aspirin enteric coated 81 milliGRAM(s) Oral daily  cholecalciferol 1000 Unit(s) Oral daily  dexAMETHasone  Injectable 6 milliGRAM(s) IV Push daily  dextrose 5%. 1000 milliLiter(s) (50 mL/Hr) IV Continuous <Continuous>  dextrose 5%. 1000 milliLiter(s) (100 mL/Hr) IV Continuous <Continuous>  dextrose 50% Injectable 25 Gram(s) IV Push once  dextrose 50% Injectable 12.5 Gram(s) IV Push once  dextrose 50% Injectable 25 Gram(s) IV Push once  glucagon  Injectable 1 milliGRAM(s) IntraMuscular once  guaiFENesin ER 1200 milliGRAM(s) Oral every 12 hours  heparin   Injectable 5000 Unit(s) SubCutaneous every 8 hours  influenza  Vaccine (HIGH DOSE) 0.7 milliLiter(s) IntraMuscular once  insulin glargine Injectable (LANTUS) 12 Unit(s) SubCutaneous at bedtime  insulin lispro (ADMELOG) corrective regimen sliding scale   SubCutaneous at bedtime  insulin lispro (ADMELOG) corrective regimen sliding scale   SubCutaneous three times a day before meals  insulin lispro Injectable (ADMELOG) 8 Unit(s) SubCutaneous three times a day before meals  petrolatum white Ointment 1 Application(s) Topical daily  potassium chloride    Tablet ER 40 milliEquivalent(s) Oral once  traZODone 150 milliGRAM(s) Oral at bedtime    MEDICATIONS  (PRN):  acetaminophen     Tablet .. 650 milliGRAM(s) Oral every 6 hours PRN Temp greater or equal to 38C (100.4F), Mild Pain (1 - 3)  albuterol    90 MICROgram(s) HFA Inhaler 2 Puff(s) Inhalation every 6 hours PRN Shortness of Breath and/or Wheezing  aluminum hydroxide/magnesium hydroxide/simethicone Suspension 30 milliLiter(s) Oral every 4 hours PRN Dyspepsia  benzocaine 15 mG/menthol 3.6 mG Lozenge 1 Lozenge Oral every 4 hours PRN Sore Throat  benzonatate 100 milliGRAM(s) Oral three times a day PRN Cough  dextrose Oral Gel 15 Gram(s) Oral once PRN Blood Glucose LESS THAN 70 milliGRAM(s)/deciliter  melatonin 3 milliGRAM(s) Oral at bedtime PRN Insomnia  ondansetron Injectable 4 milliGRAM(s) IV Push every 8 hours PRN Nausea and/or Vomiting    Vital Signs Last 24 Hrs  T(C): 37 (20 Nov 2022 12:45), Max: 37 (20 Nov 2022 12:45)  T(F): 98.6 (20 Nov 2022 12:45), Max: 98.6 (20 Nov 2022 12:45)  HR: 93 (20 Nov 2022 12:45) (68 - 93)  BP: 125/61 (20 Nov 2022 12:45) (114/56 - 136/57)  BP(mean): --  RR: 18 (20 Nov 2022 12:45) (18 - 18)  SpO2: 98% (20 Nov 2022 12:45) (97% - 100%)    Parameters below as of 20 Nov 2022 12:45  Patient On (Oxygen Delivery Method): room air        I&O's Summary    19 Nov 2022 07:01  -  20 Nov 2022 07:00  --------------------------------------------------------  IN: 0 mL / OUT: 900 mL / NET: -900 mL          Physical Exam:   GENERAL: NAD, well-groomed, well-developed  HEENT: TABITHA/   Atraumatic, Normocephalic  ENMT: No tonsillar erythema, exudates, or enlargement; Moist mucous membranes, Good dentition, No lesions  NECK: Supple, No JVD, Normal thyroid  CHEST/LUNG: Clear to auscultaion  CVS: Regular rate and rhythm; No murmurs, rubs, or gallops  GI: : Soft, Nontender, Nondistended; Bowel sounds present  NERVOUS SYSTEM:  Alert & Oriented X3  EXTREMITIES:  -edema  LYMPH: No lymphadenopathy noted  SKIN: No rashes or lesions  ENDOCRINOLOGY: No Thyromegaly  PSYCH: Appropriate    Labs:                              10.8   13.72 )-----------( 440      ( 20 Nov 2022 05:40 )             33.4                         9.5    11.33 )-----------( 366      ( 19 Nov 2022 05:13 )             29.7                         10.7   11.02 )-----------( 345      ( 18 Nov 2022 06:49 )             33.5                         10.2   9.08  )-----------( 284      ( 17 Nov 2022 06:43 )             31.2     11-20    138  |  104  |  41<H>  ----------------------------<  177<H>  4.7   |  22  |  1.26  11-19    135  |  105  |  43<H>  ----------------------------<  212<H>  5.0   |  19<L>  |  1.19  11-18    136  |  105  |  40<H>  ----------------------------<  206<H>  4.4   |  18<L>  |  1.11  11-17    136  |  102  |  34<H>  ----------------------------<  202<H>  4.0   |  21<L>  |  1.22    Ca    9.5      20 Nov 2022 05:40  Ca    8.9      19 Nov 2022 05:13  Mg     1.40     11-20  Mg     1.60     11-19    TPro  7.1  /  Alb  3.3  /  TBili  0.3  /  DBili  <0.2  /  AST  23  /  ALT  31  /  AlkPhos  83  11-20  TPro  6.1  /  Alb  3.0<L>  /  TBili  0.2  /  DBili  <0.2  /  AST  42<H>  /  ALT  28  /  AlkPhos  75  11-19  TPro  6.7  /  Alb  3.2<L>  /  TBili  0.3  /  DBili  <0.2  /  AST  29  /  ALT  32  /  AlkPhos  83  11-18  TPro  6.6  /  Alb  3.2<L>  /  TBili  0.4  /  DBili  <0.2  /  AST  47<H>  /  ALT  35<H>  /  AlkPhos  82  11-17    CAPILLARY BLOOD GLUCOSE      POCT Blood Glucose.: 436 mg/dL (20 Nov 2022 12:46)  POCT Blood Glucose.: 444 mg/dL (20 Nov 2022 12:44)  POCT Blood Glucose.: 259 mg/dL (20 Nov 2022 08:53)  POCT Blood Glucose.: 324 mg/dL (19 Nov 2022 21:14)  POCT Blood Glucose.: 354 mg/dL (19 Nov 2022 17:11)      LIVER FUNCTIONS - ( 20 Nov 2022 05:40 )  Alb: 3.3 g/dL / Pro: 7.1 g/dL / ALK PHOS: 83 U/L / ALT: 31 U/L / AST: 23 U/L / GGT: x               D-Dimer Assay, Quantitative: 410 ng/mL DDU (11-18 @ 06:49)  D-Dimer Assay, Quantitative: 416 ng/mL DDU (11-14 @ 14:24)        RECENT CULTURES:  11-16 @ 09:40 .Blood Blood-Peripheral         r< from: Xray Chest 1 View- PORTABLE-Urgent (Xray Chest 1 View- PORTABLE-Urgent .) (11.14.22 @ 01:17) >    ACC: [id number] EXAM:  XR CHEST PORTABLE URGENT 1V                          PROCEDURE DATE:  11/14/2022          INTERPRETATION:  CLINICAL INFORMATION: Chest pain, cough.    TECHNIQUE: Single portable view of the chest.    COMPARISON: None available..    FINDINGS:    No focal consolidation.  No pneumothorax or large pleural effusion.  Heart size cannot be accurately assessed in this projection.  No acute osseous abnormality.    IMPRESSION:    No focal consolidation.    --- End of Report ---          ANTWAN BHAGAT MD; Resident Radiologist  This document has been electronically signed.  KARAN ROACH MD; Attending Radiologist  This document has been electronically signed. Nov 14 2022  5:42AM    < end of copied text >  < from: Xray Chest 1 view AP/PA- Port Urgent (02.07.09 @ 03:26) >    PROCEDURE DATE:  Feb 7 2009     .    INTERPRETATION:  HISTORY: Acute renal failure.    FINDINGS: Portable semiupright view of the chest dated 02/07/2009 is   compared to 02/05/2009. There is diffuse pulmonary edema. There is no   pleural effusion. Heart and mediastinum cannot be evaluated due to   patient rotation.        IMPRESSION:   Pulmonary edema.             SKYE JUAN M.D., ATTENDING RADIOLOGIST  This examination was interpreted on:  Feb 7 2009  1:18P.  This document   has been electronically signed. Feb 7 2009  1:18P.            < end of copied text >  < from: Xray Chest 1 View- PORTABLE-Urgent (Xray Chest 1 View- PORTABLE-Urgent .) (11.14.22 @ 01:17) >      < end of copied text >         No growth to date.    11-16 @ 09:20 .Blood Blood-Peripheral                No growth to date.    11-14 @ 01:13 Clean Catch Clean Catch (Midstream)                >=3 organisms. Probable collection contamination.          RESPIRATORY CULTURES:          Studies  Chest X-RAY  CT SCAN Chest   Venous Dopplers: LE:   CT Abdomen  Others          c    
Date of Service: 11-22-22 @ 14:16    Patient is a 77y old  Female who presents with a chief complaint of Shortness of breath     (21 Nov 2022 14:47)      Any change in ROS: sheis feeling  ok : no sob:  no cough:     MEDICATIONS  (STANDING):  aspirin enteric coated 81 milliGRAM(s) Oral daily  cholecalciferol 1000 Unit(s) Oral daily  dexAMETHasone  Injectable 6 milliGRAM(s) IV Push daily  dextrose 5%. 1000 milliLiter(s) (50 mL/Hr) IV Continuous <Continuous>  dextrose 5%. 1000 milliLiter(s) (100 mL/Hr) IV Continuous <Continuous>  dextrose 50% Injectable 25 Gram(s) IV Push once  dextrose 50% Injectable 12.5 Gram(s) IV Push once  dextrose 50% Injectable 25 Gram(s) IV Push once  glucagon  Injectable 1 milliGRAM(s) IntraMuscular once  guaiFENesin ER 1200 milliGRAM(s) Oral every 12 hours  heparin   Injectable 5000 Unit(s) SubCutaneous every 8 hours  influenza  Vaccine (HIGH DOSE) 0.7 milliLiter(s) IntraMuscular once  insulin glargine Injectable (LANTUS) 12 Unit(s) SubCutaneous at bedtime  insulin lispro (ADMELOG) corrective regimen sliding scale   SubCutaneous at bedtime  insulin lispro (ADMELOG) corrective regimen sliding scale   SubCutaneous three times a day before meals  insulin lispro Injectable (ADMELOG) 8 Unit(s) SubCutaneous three times a day before meals  petrolatum white Ointment 1 Application(s) Topical daily  potassium chloride    Tablet ER 40 milliEquivalent(s) Oral once  traZODone 150 milliGRAM(s) Oral at bedtime    MEDICATIONS  (PRN):  acetaminophen     Tablet .. 650 milliGRAM(s) Oral every 6 hours PRN Temp greater or equal to 38C (100.4F), Mild Pain (1 - 3)  albuterol    90 MICROgram(s) HFA Inhaler 2 Puff(s) Inhalation every 6 hours PRN Shortness of Breath and/or Wheezing  aluminum hydroxide/magnesium hydroxide/simethicone Suspension 30 milliLiter(s) Oral every 4 hours PRN Dyspepsia  benzocaine 15 mG/menthol 3.6 mG Lozenge 1 Lozenge Oral every 4 hours PRN Sore Throat  benzonatate 100 milliGRAM(s) Oral three times a day PRN Cough  dextrose Oral Gel 15 Gram(s) Oral once PRN Blood Glucose LESS THAN 70 milliGRAM(s)/deciliter  melatonin 3 milliGRAM(s) Oral at bedtime PRN Insomnia  ondansetron Injectable 4 milliGRAM(s) IV Push every 8 hours PRN Nausea and/or Vomiting    Vital Signs Last 24 Hrs  T(C): 36.5 (22 Nov 2022 11:12), Max: 36.9 (21 Nov 2022 23:30)  T(F): 97.7 (22 Nov 2022 11:12), Max: 98.5 (21 Nov 2022 23:30)  HR: 75 (22 Nov 2022 11:12) (67 - 97)  BP: 121/54 (22 Nov 2022 11:12) (119/66 - 126/74)  BP(mean): --  RR: 18 (22 Nov 2022 11:12) (18 - 18)  SpO2: 98% (22 Nov 2022 11:12) (97% - 99%)    Parameters below as of 22 Nov 2022 11:12  Patient On (Oxygen Delivery Method): room air        I&O's Summary    21 Nov 2022 07:01  -  22 Nov 2022 07:00  --------------------------------------------------------  IN: 0 mL / OUT: 1100 mL / NET: -1100 mL    22 Nov 2022 07:01  -  22 Nov 2022 14:16  --------------------------------------------------------  IN: 0 mL / OUT: 800 mL / NET: -800 mL          Physical Exam:   GENERAL: NAD, well-groomed, well-developed  HEENT: TABITHA/   Atraumatic, Normocephalic  ENMT: No tonsillar erythema, exudates, or enlargement; Moist mucous membranes, Good dentition, No lesions  NECK: Supple, No JVD, Normal thyroid  CHEST/LUNG: Clear to auscultaion  CVS: Regular rate and rhythm; No murmurs, rubs, or gallops  GI: : Soft, Nontender, Nondistended; Bowel sounds present  NERVOUS SYSTEM:  Alert & Oriented X3  EXTREMITIES:  - edema  LYMPH: No lymphadenopathy noted  SKIN: No rashes or lesions  ENDOCRINOLOGY: No Thyromegaly  PSYCH: Appropriate    Labs:    CARDIAC MARKERS ( 21 Nov 2022 05:30 )  x     / x     / 75 U/L / x     / x                                9.9    14.34 )-----------( 429      ( 22 Nov 2022 04:31 )             31.0                         10.3   15.59 )-----------( 445      ( 21 Nov 2022 05:30 )             32.6                         10.8   13.72 )-----------( 440      ( 20 Nov 2022 05:40 )             33.4                         9.5    11.33 )-----------( 366      ( 19 Nov 2022 05:13 )             29.7     11-22    135  |  101  |  41<H>  ----------------------------<  237<H>  4.6   |  20<L>  |  1.31<H>  11-21    135  |  103  |  45<H>  ----------------------------<  224<H>  5.0   |  21<L>  |  1.42<H>  11-20    138  |  104  |  41<H>  ----------------------------<  177<H>  4.7   |  22  |  1.26  11-19    135  |  105  |  43<H>  ----------------------------<  212<H>  5.0   |  19<L>  |  1.19    Ca    9.3      22 Nov 2022 04:31  Ca    9.6      21 Nov 2022 05:30  Phos  4.1     11-22  Mg     1.80     11-22  Mg     1.50     11-21    TPro  6.6  /  Alb  3.4  /  TBili  0.2  /  DBili  <0.2  /  AST  15  /  ALT  23  /  AlkPhos  78  11-22  TPro  6.8  /  Alb  3.4  /  TBili  0.2  /  DBili  <0.2  /  AST  16  /  ALT  28  /  AlkPhos  84  11-21  TPro  7.1  /  Alb  3.3  /  TBili  0.3  /  DBili  <0.2  /  AST  23  /  ALT  31  /  AlkPhos  83  11-20  TPro  6.1  /  Alb  3.0<L>  /  TBili  0.2  /  DBili  <0.2  /  AST  42<H>  /  ALT  28  /  AlkPhos  75  11-19    CAPILLARY BLOOD GLUCOSE      POCT Blood Glucose.: 337 mg/dL (22 Nov 2022 12:30)  POCT Blood Glucose.: 268 mg/dL (22 Nov 2022 08:59)  POCT Blood Glucose.: 338 mg/dL (21 Nov 2022 22:58)  POCT Blood Glucose.: 325 mg/dL (21 Nov 2022 18:09)      LIVER FUNCTIONS - ( 22 Nov 2022 04:31 )  Alb: 3.4 g/dL / Pro: 6.6 g/dL / ALK PHOS: 78 U/L / ALT: 23 U/L / AST: 15 U/L / GGT: x               D-Dimer Assay, Quantitative: 352 ng/mL DDU (11-20 @ 13:40)  D-Dimer Assay, Quantitative: 410 ng/mL DDU (11-18 @ 06:49)        RECENT CULTURES:  11-16 @ 09:40 .Blood Blood-Peripheral         rad< from: Xray Chest 1 View- PORTABLE-Urgent (Xray Chest 1 View- PORTABLE-Urgent .) (11.14.22 @ 01:17) >    ACC: [id number] EXAM:  XR CHEST PORTABLE URGENT 1V                          PROCEDURE DATE:  11/14/2022          INTERPRETATION:  CLINICAL INFORMATION: Chest pain, cough.    TECHNIQUE: Single portable view of the chest.    COMPARISON: None available..    FINDINGS:    No focal consolidation.  No pneumothorax or large pleural effusion.  Heart size cannot be accurately assessed in this projection.  No acute osseous abnormality.    IMPRESSION:    No focal consolidation.    --- End of Report ---          ANTWAN BHAGAT MD; Resident Radiologist  This document has been electronically signed.  KARAN ROACH MD; Attending Radiologist  This document has been electronically signed. Nov 14 2022  5:42AM    < end of copied text >         No Growth Final    11-16 @ 09:20 .Blood Blood-Peripheral                No Growth Final          RESPIRATORY CULTURES:          Studies  Chest X-RAY  CT SCAN Chest   Venous Dopplers: LE:   CT Abdomen  Others              
[facility], Division of Infectious Diseases  SHOSHANA Chang Y. Patel, S. Shah, G. Michael  [phone]  ([phone] - weekdays after 5pm and weekends)    Name: CECELIA TOPETE  Age/Gender: 77y Female  MRN: [id number]    Interval History:  Patient seen this morning.   Notes reviewed.   No concerning overnight events.  Afebrile.   reports she feels well today    Allergies: HMG-CoA reductase inhibitors (Unknown)      Objective:  Vitals:   T(F): 98.2 (11-19-22 @ 05:05), Max: 98.2 (11-19-22 @ 05:05)  HR: 65 (11-19-22 @ 05:05) (65 - 77)  BP: 130/60 (11-19-22 @ 05:05) (114/66 - 143/63)  RR: 18 (11-19-22 @ 05:05) (16 - 18)  SpO2: 95% (11-19-22 @ 05:05) (95% - 98%)  Physical Examination:  General: no acute distress  HEENT: anicteric  Cardio: S1, S2, normal rate  Resp: clear to auscultation b/l  Abd: soft, NT, ND  Ext: no LE edema  Skin: warm, dry    Laboratory Studies:  CBC:                       9.5    11.33 )-----------( 366      ( 19 Nov 2022 05:13 )             29.7     WBC Trend:  11.33 11-19-22 @ 05:13  11.02 11-18-22 @ 06:49  9.08 11-17-22 @ 06:43  11.56 11-16-22 @ 06:17  11.46 11-15-22 @ 06:48  10.47 11-14-22 @ 11:37  12.03 11-14-22 @ 00:35    CMP: 11-19    135  |  105  |  43<H>  ----------------------------<  212<H>  5.0   |  19<L>  |  1.19    Ca    8.9      19 Nov 2022 05:13  Phos  3.0     11-18  Mg     1.60     11-19    TPro  6.1  /  Alb  3.0<L>  /  TBili  0.2  /  DBili  <0.2  /  AST  42<H>  /  ALT  28  /  AlkPhos  75  11-19      LIVER FUNCTIONS - ( 19 Nov 2022 05:13 )  Alb: 3.0 g/dL / Pro: 6.1 g/dL / ALK PHOS: 75 U/L / ALT: 28 U/L / AST: 42 U/L / GGT: x               Microbiology: reviewed     Culture - Blood (collected 11-16-22 @ 09:40)  Source: .Blood Blood-Peripheral  Preliminary Report (11-17-22 @ 13:02):    No growth to date.    Culture - Blood (collected 11-16-22 @ 09:20)  Source: .Blood Blood-Peripheral  Preliminary Report (11-17-22 @ 16:01):    No growth to date.    Culture - Urine (collected 11-14-22 @ 01:13)  Source: Clean Catch Clean Catch (Midstream)  Final Report (11-15-22 @ 08:36):    >=3 organisms. Probable collection contamination.        Radiology: reviewed     Medications:  acetaminophen     Tablet .. 650 milliGRAM(s) Oral every 6 hours PRN  albuterol    90 MICROgram(s) HFA Inhaler 2 Puff(s) Inhalation every 6 hours PRN  aluminum hydroxide/magnesium hydroxide/simethicone Suspension 30 milliLiter(s) Oral every 4 hours PRN  aspirin enteric coated 81 milliGRAM(s) Oral daily  benzocaine 15 mG/menthol 3.6 mG Lozenge 1 Lozenge Oral every 4 hours PRN  benzonatate 100 milliGRAM(s) Oral three times a day PRN  cholecalciferol 1000 Unit(s) Oral daily  dexAMETHasone  Injectable 6 milliGRAM(s) IV Push daily  dextrose 5%. 1000 milliLiter(s) IV Continuous <Continuous>  dextrose 5%. 1000 milliLiter(s) IV Continuous <Continuous>  dextrose 50% Injectable 25 Gram(s) IV Push once  dextrose 50% Injectable 12.5 Gram(s) IV Push once  dextrose 50% Injectable 25 Gram(s) IV Push once  dextrose Oral Gel 15 Gram(s) Oral once PRN  glucagon  Injectable 1 milliGRAM(s) IntraMuscular once  guaiFENesin ER 1200 milliGRAM(s) Oral every 12 hours  heparin   Injectable 5000 Unit(s) SubCutaneous every 8 hours  influenza  Vaccine (HIGH DOSE) 0.7 milliLiter(s) IntraMuscular once  insulin glargine Injectable (LANTUS) 8 Unit(s) SubCutaneous at bedtime  insulin lispro (ADMELOG) corrective regimen sliding scale   SubCutaneous three times a day before meals  insulin lispro (ADMELOG) corrective regimen sliding scale   SubCutaneous at bedtime  insulin lispro Injectable (ADMELOG) 5 Unit(s) SubCutaneous three times a day before meals  melatonin 3 milliGRAM(s) Oral at bedtime PRN  ondansetron Injectable 4 milliGRAM(s) IV Push every 8 hours PRN  petrolatum white Ointment 1 Application(s) Topical daily  potassium chloride    Tablet ER 40 milliEquivalent(s) Oral once  traZODone 150 milliGRAM(s) Oral at bedtime    Antimicrobials:  
SUBJECTIVE/ OVERNIGHT EVENTS:  +cough and sore throat  +tachy  otherwise stable on room air  Cepacol added  PRN antitussives added  no cp, no sob, no n/v/d. no abdominal pain.  no headache, no dizziness.         --------------------------------------------------------------------------------------------  LABS:                        10.5   11.46 )-----------( 251      ( 15 Nov 2022 06:48 )             34.5     11-15    140  |  105  |  20  ----------------------------<  165<H>  3.4<L>   |  19<L>  |  1.10    Ca    9.1      15 Nov 2022 06:48  Phos  3.0     11-14  Mg     1.60     11-14    TPro  7.6  /  Alb  3.7  /  TBili  0.2  /  DBili  x   /  AST  54<H>  /  ALT  28  /  AlkPhos  97  11-15    PT/INR - ( 15 Nov 2022 01:05 )   PT: 14.4 sec;   INR: 1.24 ratio         PTT - ( 15 Nov 2022 01:05 )  PTT:66.4 sec  CAPILLARY BLOOD GLUCOSE      POCT Blood Glucose.: 240 mg/dL (15 Nov 2022 17:05)  POCT Blood Glucose.: 255 mg/dL (15 Nov 2022 12:43)  POCT Blood Glucose.: 132 mg/dL (15 Nov 2022 09:03)  POCT Blood Glucose.: 281 mg/dL (2022 22:22)    CARDIAC MARKERS ( 2022 11:37 )  x     / x     / 686 U/L / x     / x      CARDIAC MARKERS ( 2022 03:02 )  x     / x     / 329 U/L / x     / 4.6 ng/mL      Urinalysis Basic - ( 2022 01:10 )    Color: Yellow / Appearance: Turbid / S.018 / pH: x  Gluc: x / Ketone: Negative  / Bili: Negative / Urobili: <2 mg/dL   Blood: x / Protein: 30 mg/dL / Nitrite: Negative   Leuk Esterase: Large / RBC: 10 /HPF / WBC 30 /HPF   Sq Epi: x / Non Sq Epi: 12 /HPF / Bacteria: Few        RADIOLOGY & ADDITIONAL TESTS:    Imaging Personally Reviewed:  [x] YES  [ ] NO    Consultant(s) Notes Reviewed:  [x] YES  [ ] NO    MEDICATIONS  (STANDING):  albuterol/ipratropium for Nebulization 3 milliLiter(s) Nebulizer every 6 hours  aspirin enteric coated 81 milliGRAM(s) Oral daily  cholecalciferol 1000 Unit(s) Oral daily  dexAMETHasone  Injectable 6 milliGRAM(s) IV Push daily  dextrose 5%. 1000 milliLiter(s) (100 mL/Hr) IV Continuous <Continuous>  dextrose 5%. 1000 milliLiter(s) (50 mL/Hr) IV Continuous <Continuous>  dextrose 50% Injectable 25 Gram(s) IV Push once  dextrose 50% Injectable 12.5 Gram(s) IV Push once  dextrose 50% Injectable 25 Gram(s) IV Push once  glucagon  Injectable 1 milliGRAM(s) IntraMuscular once  guaiFENesin ER 1200 milliGRAM(s) Oral every 12 hours  heparin   Injectable 5000 Unit(s) SubCutaneous every 8 hours  influenza  Vaccine (HIGH DOSE) 0.7 milliLiter(s) IntraMuscular once  insulin glargine Injectable (LANTUS) 5 Unit(s) SubCutaneous at bedtime  insulin lispro (ADMELOG) corrective regimen sliding scale   SubCutaneous three times a day before meals  potassium chloride    Tablet ER 40 milliEquivalent(s) Oral once  remdesivir  IVPB 100 milliGRAM(s) IV Intermittent every 24 hours  remdesivir  IVPB   IV Intermittent   traZODone 150 milliGRAM(s) Oral at bedtime    MEDICATIONS  (PRN):  acetaminophen     Tablet .. 650 milliGRAM(s) Oral every 6 hours PRN Temp greater or equal to 38C (100.4F), Mild Pain (1 - 3)  aluminum hydroxide/magnesium hydroxide/simethicone Suspension 30 milliLiter(s) Oral every 4 hours PRN Dyspepsia  benzocaine 15 mG/menthol 3.6 mG Lozenge 1 Lozenge Oral every 4 hours PRN Sore Throat  dextrose Oral Gel 15 Gram(s) Oral once PRN Blood Glucose LESS THAN 70 milliGRAM(s)/deciliter  melatonin 3 milliGRAM(s) Oral at bedtime PRN Insomnia  ondansetron Injectable 4 milliGRAM(s) IV Push every 8 hours PRN Nausea and/or Vomiting      Care Discussed with Consultants/Other Providers [x] YES  [ ] NO    Vital Signs Last 24 Hrs  T(C): 38.4 (15 Nov 2022 14:28), Max: 38.4 (15 Nov 2022 14:28)  T(F): 101.1 (15 Nov 2022 14:28), Max: 101.1 (15 Nov 2022 14:28)  HR: 127 (15 Nov 2022 14:28) (100 - 127)  BP: 120/87 (15 Nov 2022 14:28) (120/87 - 127/68)  BP(mean): --  RR: 20 (15 Nov 2022 09:03) (18 - 22)  SpO2: 99% (15 Nov 2022 09:03) (99% - 100%)    Parameters below as of 15 Nov 2022 09:03  Patient On (Oxygen Delivery Method): room air      I&O's Summary      PHYSICAL EXAM:  GENERAL: NAD, well-developed, comfortable on nasal canula   HEAD:  Atraumatic, Normocephalic  EYES: EOMI, PERRLA, conjunctiva and sclera clear  NECK: Supple, No JVD  CHEST/LUNG: mild decrease breath sounds bilaterally; No wheeze   HEART: Regular rate and rhythm; No murmurs, rubs, or gallops  ABDOMEN: Soft, Nontender, Nondistended; Bowel sounds present  Neuro: AAOx3, no focal weakness, 5/5 b/l extremity strength  EXTREMITIES:  2+ Peripheral Pulses, No clubbing, cyanosis, or edema  SKIN: No rashes or lesions  
SUBJECTIVE/ OVERNIGHT EVENTS:  vertigo symptoms still there  she wants to try higher dose of Meclizine. increased to 25 mg PRN  no cp, no sob, no n/v/d. no abdominal pain.  no headache, no dizziness.   rehab planning in progress.        --------------------------------------------------------------------------------------------  LABS:                        9.8    15.39 )-----------( 393      ( 24 Nov 2022 07:43 )             31.2     11-24    134<L>  |  101  |  37<H>  ----------------------------<  200<H>  4.5   |  19<L>  |  1.27    Ca    9.4      24 Nov 2022 07:43  Phos  3.6     11-24  Mg     2.00     11-24    TPro  6.7  /  Alb  3.4  /  TBili  0.2  /  DBili  <0.2  /  AST  16  /  ALT  23  /  AlkPhos  77  11-23      CAPILLARY BLOOD GLUCOSE      POCT Blood Glucose.: 423 mg/dL (24 Nov 2022 21:24)  POCT Blood Glucose.: 362 mg/dL (24 Nov 2022 18:30)  POCT Blood Glucose.: 442 mg/dL (24 Nov 2022 12:21)  POCT Blood Glucose.: 428 mg/dL (24 Nov 2022 12:20)  POCT Blood Glucose.: 309 mg/dL (24 Nov 2022 09:36)            RADIOLOGY & ADDITIONAL TESTS:    Imaging Personally Reviewed:  [x] YES  [ ] NO    Consultant(s) Notes Reviewed:  [x] YES  [ ] NO    MEDICATIONS  (STANDING):  aspirin enteric coated 81 milliGRAM(s) Oral daily  cholecalciferol 1000 Unit(s) Oral daily  dextrose 5%. 1000 milliLiter(s) (100 mL/Hr) IV Continuous <Continuous>  dextrose 5%. 1000 milliLiter(s) (50 mL/Hr) IV Continuous <Continuous>  dextrose 50% Injectable 25 Gram(s) IV Push once  dextrose 50% Injectable 12.5 Gram(s) IV Push once  dextrose 50% Injectable 25 Gram(s) IV Push once  glucagon  Injectable 1 milliGRAM(s) IntraMuscular once  guaiFENesin ER 1200 milliGRAM(s) Oral every 12 hours  heparin   Injectable 5000 Unit(s) SubCutaneous every 8 hours  influenza  Vaccine (HIGH DOSE) 0.7 milliLiter(s) IntraMuscular once  insulin glargine Injectable (LANTUS) 15 Unit(s) SubCutaneous at bedtime  insulin lispro (ADMELOG) corrective regimen sliding scale   SubCutaneous three times a day before meals  insulin lispro (ADMELOG) corrective regimen sliding scale   SubCutaneous at bedtime  insulin lispro Injectable (ADMELOG) 14 Unit(s) SubCutaneous three times a day before meals  petrolatum white Ointment 1 Application(s) Topical daily  potassium chloride    Tablet ER 40 milliEquivalent(s) Oral once  traZODone 150 milliGRAM(s) Oral at bedtime    MEDICATIONS  (PRN):  acetaminophen     Tablet .. 650 milliGRAM(s) Oral every 6 hours PRN Temp greater or equal to 38C (100.4F), Mild Pain (1 - 3)  albuterol    90 MICROgram(s) HFA Inhaler 2 Puff(s) Inhalation every 6 hours PRN Shortness of Breath and/or Wheezing  aluminum hydroxide/magnesium hydroxide/simethicone Suspension 30 milliLiter(s) Oral every 4 hours PRN Dyspepsia  benzocaine 15 mG/menthol 3.6 mG Lozenge 1 Lozenge Oral every 4 hours PRN Sore Throat  benzonatate 100 milliGRAM(s) Oral three times a day PRN Cough  dextrose Oral Gel 15 Gram(s) Oral once PRN Blood Glucose LESS THAN 70 milliGRAM(s)/deciliter  meclizine 25 milliGRAM(s) Oral two times a day PRN Dizziness  melatonin 3 milliGRAM(s) Oral at bedtime PRN Insomnia  ondansetron Injectable 4 milliGRAM(s) IV Push every 8 hours PRN Nausea and/or Vomiting  simethicone 80 milliGRAM(s) Chew three times a day PRN Gas      Care Discussed with Consultants/Other Providers [x] YES  [ ] NO    Vital Signs Last 24 Hrs  T(C): 36.6 (24 Nov 2022 18:06), Max: 36.6 (24 Nov 2022 10:03)  T(F): 97.9 (24 Nov 2022 18:06), Max: 97.9 (24 Nov 2022 10:03)  HR: 88 (24 Nov 2022 18:06) (69 - 91)  BP: 130/54 (24 Nov 2022 18:06) (113/69 - 144/71)  BP(mean): --  RR: 18 (24 Nov 2022 18:06) (17 - 18)  SpO2: 98% (24 Nov 2022 18:06) (97% - 99%)    Parameters below as of 24 Nov 2022 18:06  Patient On (Oxygen Delivery Method): room air      I&O's Summary    23 Nov 2022 07:01  -  24 Nov 2022 07:00  --------------------------------------------------------  IN: 0 mL / OUT: 1700 mL / NET: -1700 mL      PHYSICAL EXAM:  GENERAL: NAD, well-developed, comfortable on nasal canula, out of bed in chair  HEAD:  Atraumatic, Normocephalic  EYES: EOMI, PERRLA, conjunctiva and sclera clear  NECK: Supple, No JVD  CHEST/LUNG: mild decrease breath sounds bilaterally; No wheeze   HEART: Regular rate and rhythm; No murmurs, rubs, or gallops  ABDOMEN: Soft, Nontender, Nondistended; Bowel sounds present  Neuro: AAOx3, no focal weakness, 5/5 b/l extremity strength  EXTREMITIES:  2+ Peripheral Pulses, No clubbing, cyanosis, or edema
She feels weaker compared to yesterday  Eager to initiate PT  No fevers so far this morning    Vital Signs Last 24 Hrs  T(C): 36.9 (16 Nov 2022 11:39), Max: 38.6 (15 Nov 2022 22:39)  T(F): 98.5 (16 Nov 2022 11:39), Max: 101.5 (15 Nov 2022 22:39)  HR: 98 (16 Nov 2022 11:39) (90 - 127)  BP: 124/70 (16 Nov 2022 11:39) (120/87 - 159/115)  BP(mean): --  RR: 20 (16 Nov 2022 11:39) (20 - 23)  SpO2: 98% (16 Nov 2022 11:39) (97% - 100%)    I&O's Summary      PHYSICAL EXAM:  GENERAL: NAD, well-developed, comfortable on nasal canula   HEAD:  Atraumatic, Normocephalic  EYES: EOMI, PERRLA, conjunctiva and sclera clear  NECK: Supple, No JVD  CHEST/LUNG: mild decrease breath sounds bilaterally; No wheeze   HEART: Regular rate and rhythm; No murmurs, rubs, or gallops  ABDOMEN: Soft, Nontender, Nondistended; Bowel sounds present  Neuro: AAOx3, no focal weakness, 5/5 b/l extremity strength  EXTREMITIES:  2+ Peripheral Pulses, No clubbing, cyanosis, or edema    LABS:                        10.5   11.56 )-----------( 265      ( 16 Nov 2022 06:17 )             32.9     11-16    137  |  101  |  24<H>  ----------------------------<  191<H>  3.6   |  20<L>  |  1.15    Ca    9.2      16 Nov 2022 06:17    TPro  6.9  /  Alb  3.5  /  TBili  0.5  /  DBili  x   /  AST  69<H>  /  ALT  35<H>  /  AlkPhos  90  11-16    PT/INR - ( 15 Nov 2022 01:05 )   PT: 14.4 sec;   INR: 1.24 ratio         PTT - ( 15 Nov 2022 01:05 )  PTT:66.4 sec  CAPILLARY BLOOD GLUCOSE      POCT Blood Glucose.: 242 mg/dL (16 Nov 2022 09:07)  POCT Blood Glucose.: 261 mg/dL (15 Nov 2022 22:00)  POCT Blood Glucose.: 240 mg/dL (15 Nov 2022 17:05)  POCT Blood Glucose.: 255 mg/dL (15 Nov 2022 12:43)            RADIOLOGY & ADDITIONAL TESTS:    Imaging Personally Reviewed:  [x] YES  [ ] NO    Will obtain old records:  [ ] YES  [x] NO  
Sitting in chair  Feels tired  Saturating well on RA    Vital Signs Last 24 Hrs  T(C): 36.8 (21 Nov 2022 05:54), Max: 37 (20 Nov 2022 12:45)  T(F): 98.3 (21 Nov 2022 05:54), Max: 98.6 (20 Nov 2022 12:45)  HR: 72 (21 Nov 2022 05:54) (72 - 93)  BP: 139/63 (21 Nov 2022 05:54) (114/66 - 139/63)  BP(mean): --  RR: 17 (21 Nov 2022 05:54) (17 - 18)  SpO2: 99% (21 Nov 2022 05:54) (96% - 99%)    I&O's Summary    11-20-22 @ 07:01  -  11-21-22 @ 07:00  --------------------------------------------------------  IN: 0 mL / OUT: 0 mL / NET: 0 mL        PHYSICAL EXAM:  GENERAL: NAD, well-developed, comfortable on nasal canula   HEAD:  Atraumatic, Normocephalic  EYES: EOMI, PERRLA, conjunctiva and sclera clear  NECK: Supple, No JVD  CHEST/LUNG: mild decrease breath sounds bilaterally; No wheeze   HEART: Regular rate and rhythm; No murmurs, rubs, or gallops  ABDOMEN: Soft, Nontender, Nondistended; Bowel sounds present  Neuro: AAOx3, no focal weakness, 5/5 b/l extremity strength  EXTREMITIES:  2+ Peripheral Pulses, No clubbing, cyanosis, or edema    LABS:                        10.3   15.59 )-----------( 445      ( 21 Nov 2022 05:30 )             32.6     11-21    135  |  103  |  45<H>  ----------------------------<  224<H>  5.0   |  21<L>  |  1.42<H>    Ca    9.6      21 Nov 2022 05:30  Mg     1.50     11-21    TPro  6.8  /  Alb  3.4  /  TBili  0.2  /  DBili  <0.2  /  AST  16  /  ALT  28  /  AlkPhos  84  11-21      CAPILLARY BLOOD GLUCOSE      POCT Blood Glucose.: 267 mg/dL (21 Nov 2022 09:07)  POCT Blood Glucose.: 345 mg/dL (20 Nov 2022 21:49)  POCT Blood Glucose.: 374 mg/dL (20 Nov 2022 18:28)  POCT Blood Glucose.: 392 mg/dL (20 Nov 2022 18:27)  POCT Blood Glucose.: 436 mg/dL (20 Nov 2022 12:46)  POCT Blood Glucose.: 444 mg/dL (20 Nov 2022 12:44)    CARDIAC MARKERS ( 21 Nov 2022 05:30 )  x     / x     / 75 U/L / x     / x              RADIOLOGY & ADDITIONAL TESTS:    Imaging Personally Reviewed:  [x] YES  [ ] NO    Will obtain old records:  [ ] YES  [x] NO
Sitting in chair  No significant changes in how she feels  Says dizziness is mildly improved    Vital Signs Last 24 Hrs  T(C): 36.6 (20 Nov 2022 05:00), Max: 36.7 (19 Nov 2022 20:00)  T(F): 97.8 (20 Nov 2022 05:00), Max: 98 (19 Nov 2022 20:00)  HR: 68 (20 Nov 2022 05:00) (62 - 83)  BP: 136/57 (20 Nov 2022 05:00) (114/56 - 136/57)  BP(mean): --  RR: 18 (20 Nov 2022 05:00) (17 - 18)  SpO2: 99% (20 Nov 2022 05:00) (97% - 100%)    I&O's Summary    11-19-22 @ 07:01  -  11-20-22 @ 07:00  --------------------------------------------------------  IN: 0 mL / OUT: 900 mL / NET: -900 mL        PHYSICAL EXAM:  GENERAL: NAD, well-developed, comfortable on nasal canula   HEAD:  Atraumatic, Normocephalic  EYES: EOMI, PERRLA, conjunctiva and sclera clear  NECK: Supple, No JVD  CHEST/LUNG: mild decrease breath sounds bilaterally; No wheeze   HEART: Regular rate and rhythm; No murmurs, rubs, or gallops  ABDOMEN: Soft, Nontender, Nondistended; Bowel sounds present  Neuro: AAOx3, no focal weakness, 5/5 b/l extremity strength  EXTREMITIES:  2+ Peripheral Pulses, No clubbing, cyanosis, or edema      LABS:                        10.8   13.72 )-----------( 440      ( 20 Nov 2022 05:40 )             33.4     11-20    138  |  104  |  41<H>  ----------------------------<  177<H>  4.7   |  22  |  1.26    Ca    9.5      20 Nov 2022 05:40  Mg     1.40     11-20    TPro  7.1  /  Alb  3.3  /  TBili  0.3  /  DBili  <0.2  /  AST  23  /  ALT  31  /  AlkPhos  83  11-20      CAPILLARY BLOOD GLUCOSE      POCT Blood Glucose.: 259 mg/dL (20 Nov 2022 08:53)  POCT Blood Glucose.: 324 mg/dL (19 Nov 2022 21:14)  POCT Blood Glucose.: 354 mg/dL (19 Nov 2022 17:11)  POCT Blood Glucose.: 374 mg/dL (19 Nov 2022 12:51)            RADIOLOGY & ADDITIONAL TESTS:    Imaging Personally Reviewed:  [x] YES  [ ] NO    Will obtain old records:  [ ] YES  [x] NO

## 2022-11-25 NOTE — PROVIDER CONTACT NOTE (OTHER) - DATE AND TIME:
15-Nov-2022 10:17
23-Nov-2022 22:00
17-Nov-2022 12:10
25-Nov-2022 00:01
15-Nov-2022 23:26
17-Nov-2022 14:00

## 2022-11-25 NOTE — PROGRESS NOTE ADULT - ASSESSMENT
77 year old female, PMH of covid 19 4/2020, HTN, HLD, NIDDM, former smoker, quit 15 years ago, vaccinated for covid and boosted in the summer, presents to ER with symptoms of dizziness and weak almost fell down  covid 19    COVID infection  initially required O2  s/p remdesivir x 5 day course, completed 11/18  s/p decadron x 10 days  ac ppx  trend biomarkers  now off isolation  discharge planning    fever- resolved  likely 2/2 covid  blood cultures- no growth final  denies dysuria    leukocytosis   likely 2/2 steroids    vertigo  reports developing vertigo around same time as resp symptoms  had vertigo last time she had covid as well  s/p carotid US  CT head- unrevealing  on meclizine    We will sign off. Thank you for allowing us to participate in the care of Ms. Brown. Please feel free to call with any questions or concerns.     Simon Rodriguez M.D.  [facility], Division of Infectious Diseases  [phone]  After 5pm on weekdays and all day on weekends - please call [phone]

## 2022-11-25 NOTE — DISCHARGE NOTE NURSING/CASE MANAGEMENT/SOCIAL WORK - PATIENT PORTAL LINK FT
You can access the FollowMyHealth Patient Portal offered by [facility] by registering at the following website: [email or web address]. By joining [email or web address]’s FollowMyHealth portal, you will also be able to view your health information using other applications (apps) compatible with our system.

## 2022-11-25 NOTE — PROVIDER CONTACT NOTE (OTHER) - ASSESSMENT
Fingerstick 441 and 459. Patient denies any distress or discomfort.
PSA is 2.47 most recently.  This is normal range.
pt denies any complaints.
Patient A&Ox4. Denies chest pain or palpitations. Pt states feeling congested.
Patient with episode of blood tinged, dark sputum. patient stated this is the first time this has happened. Denies any other distress or discomfort
Fingerstick was performed at bedtime with the results of 407 mg/dl. Patient remained free of any s/s of hyperglycemia.
pt asymptomatic
no difficulty in swallowing/no hoarseness

## 2022-11-25 NOTE — PROVIDER CONTACT NOTE (OTHER) - NAME OF MD/NP/PA/DO NOTIFIED:
TENA Kelly
BRENDAN Saavedra
[facility] [id number]
TENA Kelly
hesham Hernandez
Adelaida De León

## 2022-11-25 NOTE — PROVIDER CONTACT NOTE (OTHER) - RECOMMENDATIONS
pt given 4 units of insulin per sliding scale and bedtime lantus as ordered
As per PA, will come to bedside to assess
As per PA, will change diet to consistent carb and to administer 6 units of insulin only at this time
4 units of insulin lispro given per sliding scale. 15 units of lantus also given. PA made aware.

## 2022-11-25 NOTE — DISCHARGE NOTE PROVIDER - NSDCFUADDAPPT_GEN_ALL_CORE_FT
Follow up with PCP within 1-2 weeks of discharge. If you are in need of a general medicine physician and post-discharge medical follow-up for further care/recommendations you may contact the [facility] for an appointment at [phone].     Follow up with neurology for dizziness  within 1-2 weeks of discharge. You may follow up at [address] [place] [address]. Please call [phone] for an appointment or you may follow up with Dr. Astudillo.

## 2022-11-25 NOTE — DISCHARGE NOTE PROVIDER - DISCHARGE DATE
Duration Of Freeze Thaw-Cycle (Seconds): 3 Consent: The patient's consent was obtained including but not limited to risks of crusting, scabbing, blistering, scarring, darker or lighter pigmentary change, recurrence, incomplete removal and infection. Render Post-Care Instructions In Note?: no Post-Care Instructions: I reviewed with the patient in detail post-care instructions. Patient is to wear sunprotection, and avoid picking at any of the treated lesions. Pt may apply Vaseline to crusted or scabbing areas. Detail Level: Zone Total Number Of Aks Treated: 6 Number Of Freeze-Thaw Cycles: 2 freeze-thaw cycles 25-Nov-2022

## 2022-11-25 NOTE — DISCHARGE NOTE PROVIDER - CARE PROVIDER_API CALL
[facility]  NEUROLOGY  [address] [place] [address]  Phone: [phone]  Fax: [phone]  Follow Up Time:

## 2022-11-25 NOTE — DISCHARGE NOTE PROVIDER - HOSPITAL COURSE
77 year old female, PMHx of HTN, HLD, NIDDM, former smoker, quit 15 years ago, vaccinated for covid and boosted in the summer, who presented to the ED with symptoms of shortness of breath, cough, congestion, nausea, no vomiting, loose stool. She initially presented to urgent care and found to have COVID. She was told she had an abnormal EKG and sent to the ED. In ER, EKG with no signs of ACS, elevated troponins. Seen by cardiology, likely demand ischemia. Trops plateau. Echo without segmental LV motion abnormalities, minimal MR, normal LV systolic function. For COVID, she was treated with Rem and Dexamethasone. Her FS became elevated in context of steroid use for which insulin was adjusted. Anticipate much lower requirement once off of steroids. CXR without consolidation. No oxygen requirements during admission. Course complicated by dizziness. CTH negative. Orthostatics negative. To continue Meclizine as needed. Lisinopril was held 2/2 to PALMA, s/p IVF. 77 year old female, PMHx of HTN, HLD, NIDDM, former smoker, quit 15 years ago, vaccinated for covid and boosted in the summer, who presented to the ED with symptoms of shortness of breath, cough, congestion, nausea, no vomiting, loose stool. She initially presented to urgent care and found to have COVID. She was told she had an abnormal EKG and sent to the ED. In ER, EKG with no signs of ACS, elevated troponins. Seen by cardiology, likely demand ischemia. Trops plateau. Echo without segmental LV motion abnormalities, minimal MR, normal LV systolic function. For COVID, she was treated with Rem and Dexamethasone. Her FS became elevated in context of steroid use for which insulin was adjusted. Anticipate much lower requirement once off of steroids. CXR without consolidation. No oxygen requirements during admission. Course complicated by dizziness. CTH negative. Orthostatics negative. To continue Meclizine as needed. Lisinopril was held 2/2 to PALMA, s/p IVF. Will coninue to hold. BP stable.   Medically cleared/stable for discharge as per Dr. Padilla with close outpatient follow up within 1-2 weeks of discharge. Patient understands and agrees with plan of care. 77 year old female, PMHx of HTN, HLD, NIDDM, former smoker, quit 15 years ago, vaccinated for covid and boosted in the summer, who presented to the ED with symptoms of shortness of breath, cough, congestion, nausea, no vomiting, loose stool. She initially presented to urgent care and found to have COVID. She was told she had an abnormal EKG and sent to the ED. In ER, EKG with no signs of ACS, elevated troponins. Seen by cardiology, likely demand ischemia. Trops plateau. Echo without segmental LV motion abnormalities, minimal MR, normal LV systolic function. For COVID, she was treated with Rem and Dexamethasone. Her FS became elevated in context of steroid use for which insulin was adjusted. Anticipate much lower requirement once off of steroids. CXR without consolidation. No oxygen requirements during admission. Course complicated by dizziness. CTH negative. Orthostatics negative. To continue Meclizine as needed. Lisinopril was held 2/2 to PALMA, s/p IVF. Will coninue to hold. BP stable.   Medically cleared/stable for discharge as per Dr. Padilla with close outpatient follow up within 1-2 weeks of discharge. Patient understands and agrees with plan of care.     Attending Addendum:  Patient seen and examined by me on the discharge day. Medications reviewed.  All questions answered in details. Follow up plan explained.  More than 30 mins were spent evaluating patient and coordinating care for discharge.  Discharge summary sent to pt's primary care physician at [facility].

## 2022-11-25 NOTE — PROVIDER CONTACT NOTE (OTHER) - ACTION/TREATMENT ORDERED:
As per PA, will come to bedside to assess
No new orders at this time, fingerstick now 288
spoke to covering team. said that pt doesn't need heparin anymore. will d/c.
4 units of insulin lispro given per sliding scale. 15 units of lantus also given. PA made aware.
No new orders at this time.
As per PA, will change diet to consistent carb and to administer 6 units of insulin only at this time

## 2022-11-25 NOTE — PROGRESS NOTE ADULT - PROVIDER SPECIALTY LIST ADULT
Infectious Disease
Internal Medicine
Internal Medicine
Pulmonology
Pulmonology
Cardiology
Cardiology
Infectious Disease
Internal Medicine
Neurology
Neurology
Pulmonology
Cardiology
Cardiology
Infectious Disease
Internal Medicine
Internal Medicine
Pulmonology
Infectious Disease
Internal Medicine

## 2022-11-25 NOTE — DISCHARGE NOTE PROVIDER - NSDCCPCAREPLAN_GEN_ALL_CORE_FT
PRINCIPAL DISCHARGE DIAGNOSIS  Diagnosis: SOB (shortness of breath)  Assessment and Plan of Treatment: You presented with symptoms of shortness of breath and cough, found to have COVID. You were treated with Remdesivir and Dexamethasone. Your sugars became elevated in the context of steroids being used to treat your COVID. Your insulin was adjusted but it is anticipated this will continue to need to be adjusted once you are off steroids.      SECONDARY DISCHARGE DIAGNOSES  Diagnosis: Vertigo  Assessment and Plan of Treatment: You had complaints of dizziness. Your CT of the head was negative for any strokes or acute pathology. Continue Meclizine as needed.     PRINCIPAL DISCHARGE DIAGNOSIS  Diagnosis: SOB (shortness of breath)  Assessment and Plan of Treatment: You presented with symptoms of shortness of breath and cough, found to have COVID. You were treated with Remdesivir and Dexamethasone. Your sugars became elevated in the context of steroids being used to treat your COVID. Your insulin was adjusted while you were admitted. Continue your home regimen and continue insulin sliding scale at rehab until your FS are better controlled.      SECONDARY DISCHARGE DIAGNOSES  Diagnosis: Vertigo  Assessment and Plan of Treatment: You had complaints of dizziness. Your CT of the head was negative for any strokes or acute pathology. Continue Meclizine as needed.

## 2022-11-25 NOTE — DISCHARGE NOTE PROVIDER - NSDCMRMEDTOKEN_GEN_ALL_CORE_FT
aspirin 81 mg oral delayed release tablet: 1 tab(s) orally once a day  glimepiride 2 mg oral tablet: 1 tab(s) orally 2 times a day  lisinopril 20 mg oral tablet: 1 tab(s) orally 2 times a day  metFORMIN 1000 mg oral tablet: 1 tab(s) orally 2 times a day  pravastatin 80 mg oral tablet: 1 tab(s) orally once a day  traZODone 150 mg oral tablet: 1 tab(s) orally once a day (at bedtime)  Trulicity Pen 1.5 mg/0.5 mL subcutaneous solution: 1 each subcutaneous once a week  Vitamin D3 25 mcg (1000 intl units) oral tablet, chewable: 1 tab(s) orally once a day   albuterol 90 mcg/inh inhalation aerosol: 2 puff(s) inhaled every 6 hours, As needed, Shortness of Breath and/or Wheezing  aspirin 81 mg oral delayed release tablet: 1 tab(s) orally once a day  benzonatate 100 mg oral capsule: 1 cap(s) orally 3 times a day, As needed, Cough  glimepiride 2 mg oral tablet: 1 tab(s) orally 2 times a day  guaiFENesin 1200 mg oral tablet, extended release: 1 tab(s) orally every 12 hours  insulin lispro 100 units/mL injectable solution: injectable 3 times a day (before meals) - 1 Unit(s) if Glucose 151 - 200  2 Unit(s) if Glucose 201 - 250  3 Unit(s) if Glucose 251 - 300  4 Unit(s) if Glucose 301 - 350  5 Unit(s) if Glucose 351 - 400  6 Unit(s) if Glucose Greater Than 400  meclizine 25 mg oral tablet: 1 tab(s) orally 2 times a day, As needed, Dizziness  melatonin 3 mg oral tablet: 1 tab(s) orally once a day (at bedtime), As needed, Insomnia  metFORMIN 1000 mg oral tablet: 1 tab(s) orally 2 times a day  pravastatin 80 mg oral tablet: 1 tab(s) orally once a day  traZODone 150 mg oral tablet: 1 tab(s) orally once a day (at bedtime)  Trulicity Pen 1.5 mg/0.5 mL subcutaneous solution: 1 each subcutaneous once a week  Vitamin D3 25 mcg (1000 intl units) oral tablet, chewable: 1 tab(s) orally once a day

## 2022-11-25 NOTE — PROGRESS NOTE ADULT - ASSESSMENT
77 year old female, PMH of HTN, HLD, NIDDM, former smoker, quit 15 years ago, vaccinated for covid and boosted in the summer, presents to ER with symptoms of shortness of breath, cough, congestion, nausea, no vomiting, loose stool. Per pt, she was in usual state of health up until  2 days ago. Last night, patient was taken to  and tested positive for Covid. She was told EKG was abnormal so she came to ED for evaluation. Pt has no current reports of chest pain but endorses weakness and feels like she may pass out, no syncopal episodes. In ER, EKG with no signs of ACS. Cardiac enzymes and troponin are positive. Troponin 169, delta 173. CPK mildly elevated to 329, CKMB 4.6 index is 1.4.  Patient has multiple sick contacts at home,  is flu positive.  Cardiology consulted for possible NSTEMI in setting of covid. Start Remdesivir and Decadron. ID consulted.     Acute respiratory failure with hypoxia due to Covid19  Chest pain, elevated  trop  Diabetes  Hypertension  Acute kidney failure  DVT ppx      11/14/2022:    Acute respiratory failure with hypoxia due to Covid19: she is using 2 L of oxygen : agree with remdesivir as well as dexa: mantain o2 sao2 above 935 all the time:  check d dimer: ordered:  Follow LFTs and renal profile while on remdesivir:   Chest pain, elevated  trop : ? demand ischemia:  would defer to cards / PMD   Diabetes : monitor and control on dexa:   Hypertension ; controlled  Acute kidney failure : creat is downtrending:  cont to follow   DVT ppx    [facility]     11/15:    Acute respiratory failure with hypoxia due to Covid19: she is using 2 L of oxygen  but currently off oxygen : monitor o2 saio2:  keep above 92% all the time:  : agree with remdesivir as well as dexa:  check d dimer: ordered:  Follow LFTs and renal profile while on remdesivir:   Chest pain, elevated  trop : ? demand ischemia:  would defer to cards / PMD   Diabetes : monitor and control on dexa:   Hypertension ; controlled  Acute kidney failure : creat is downtrending:  cont to follow   DVT ppx  ? start lovenox: [facility]     11/16:    Acute respiratory failure with hypoxia due to Covid19: she is using 2 L of oxygen  but currently off oxygen : monitor o2 saio2:  keep above 92% all the time:  : agree with remdesivir as well as dexa:  trend d dimer:   Follow LFTs and renal profile while on remdesivir:   Chest pain, elevated  trop : ? demand ischemia:  would defer to cards / PMD : resolved  Diabetes : monitor and control on dexa:   Hypertension ; controlled  Acute kidney failure : creat is downtrending:  cont to follow   DVT ppx  : on heparin sq tid  [facility]     11/17:    Acute respiratory failure with hypoxia due to Covid19: off oxygen  : monitor o2 saio2:  keep above 92% all the time:  : finished   remdesivir andon  dexa:  trend d dimer: slightly high :  Chest pain, elevated  trop : ? demand ischemia:  would defer to cards / PMD : resolved  Diabetes : monitor and control on dexa:   Hypertension ; controlled  Acute kidney failure : creat is downtrending:  cont to follow   DVT ppx  : on heparin sq tid  [facility]    11/18:    Acute respiratory failure with hypoxia due to Covid19: off oxygen  : monitor o2 sa o2 above 92% all the time:  : On  remdesivir as well as dexa:  trend d dimer: slightly decreased today   Chest pain, elevated  trop : ? demand ischemia:  would defer to cards / PMD : resolved  Diabetes : monitor and control on dexa:   Hypertension ; controlled  Acute kidney failure : creat is downtrending:  cont to follow   DVT ppx  : on heparin sq tid  [facility]    11/19:    Acute respiratory failure with hypoxia due to Covid19: off oxygen  : monitor o2 sa o2 above 92% all the time:  : finished   remdesivir and on  dexa:  trend d dimer: slightly decreased :  VERTIGO: defer to primary team : neuro to see  Chest pain, elevated  trop : ? demand ischemia:  would defer to cards / PMD : resolved  Diabetes : monitor and control on dexa:   Hypertension ; controlled  Acute kidney failure : creat is downtrending:  cont to follow   DVT ppx  : on heparin sq tid  [facility]    11/20:    Acute respiratory failure with hypoxia due to Covid19: off oxygen  : monitor o2 sa o2 above 92% all the time:  : finished   remdesivir and on  dexa:  trend d dimer: slightly decreased : she deos feel SOB on exertion:  do d dimer today  : if increased do cta   VERTIGO: defer to primary team : neuro to see  Chest pain, elevated  trop : ? demand ischemia:  would defer to cards / PMD : resolved  Diabetes : monitor and control on dexa:   Hypertension ; controlled  Acute kidney failure : creat is downtrending:  cont to follow   DVT ppx  : on heparin sq tid  [facility]    11/21:       Acute respiratory failure with hypoxia due to Covid19: off oxygen  : monitor o2 sa o2 above 92% all the time:  : finished   remdesivir and on  dexa:  trend d dimer: slightly decreased : she does feel SOB on exertion:  do d dimer cheked yesteday and it had decreased   :    hence cta was not done  , in addition her renal functions got worse and the clinical probability of pe is pretty low;   VERTIGO: defer to primary team : neuro to see  Chest pain, elevated  trop : ? demand ischemia:  would defer to cards / PMD : resolved  Diabetes : monitor and control on dexa:   Hypertension ; controlled  Acute kidney failure : creat is downtrending:  cont to follow   DVT ppx  : on heparin sq tid   PMD  11/22:    Acute respiratory failure with hypoxia due to Covid19: off oxygen  no sob:  repeat d dimer in AM    VERTIGO: defer to primary team :per neurology   Chest pain, elevated  trop : ? demand ischemia:  would defer to cards / PMD : resolved  Diabetes : monitor and control on dexa:   she will be on dexa for total of 10 days:   Hypertension ; controlled  Acute kidney failure : creat is downtrending:  cont to follow   DVT ppx  : on heparin sq tid   PMD    11/23:      Acute respiratory failure with hypoxia due to Covid19: off oxygen  no sob:  d dimer further decreased : on rooma ir  VERTIGO: defer to primary team :per neurology   Chest pain, elevated  trop : ? demand ischemia:  would defer to cards / PMD : resolved  Diabetes : monitor and control on dexa:   she will be on dexa for total of 10 days:   Hypertension ; controlled  Acute kidney failure : creat is downtrending:  cont to follow   DVT ppx  : on heparin sq tid   PMD    11/24:    Acute respiratory failure with hypoxia due to Covid19: off oxygen  no sob:  d dimer further decreased : on room air  VERTIGO: defer to primary team :per neurology   Chest pain, elevated  trop : per cards  Diabetes : dexa completed  Hypertension ; controlled  Acute kidney failure : creat is downtrending:  cont to follow  ; 1.27 today  DVT ppx  : on heparin sq tid  dw acp    11/25:    Acute respiratory failure with hypoxia due to Covid19: off oxygen  no sob:  stable: for possible dc today   VERTIGO: defer to primary team :per neurology   Chest pain, elevated  trop : per cards  Diabetes : dexa completed  Hypertension ; controlled  Acute kidney failure : creat is downtrending:  cont to follow  ; 1.34 today: defer to primary team  DVT ppx  : on heparin sq tid  dw acp

## 2022-11-25 NOTE — PROVIDER CONTACT NOTE (OTHER) - BACKGROUND
Patient has a hx of DM2 and is also receiving decadron IVP
Admitted for shortness of breath. PMHx of HTN, HLD, NIDDM
Admitted for shortness of breath. PMHx of HTN, HLD, and DM2
Admitted for shortness of breath. PMHx of HTN, HLD, and DM2
pt admitted for shortness of breath, fingersticks have been elevated related to dexamethasone

## 2023-02-13 NOTE — PATIENT PROFILE ADULT - HAS THE PATIENT RECEIVED THE INFLUENZA VACCINE THIS SEASON?
no... No Residual Tumor Seen Histology Text: There were no malignant cells seen in the sections examined.

## 2023-04-14 NOTE — PHYSICAL THERAPY INITIAL EVALUATION ADULT - NSPTDISCHREC_GEN_A_CORE
anticipated discharge to rehab facility to address current functional limitation to optimize safety to allow pt. to reach their optimal level of function./Sub-acute Rehab
39w

## 2023-06-03 NOTE — PATIENT PROFILE ADULT - MEDICATIONS/VISITS
How Severe Is Your Rash?: mild
Is This A New Presentation, Or A Follow-Up?: Rash
Additional History: Started flaring during the winter and gets better during the summer but has not stopped flaring this year.
no

## 2024-12-03 NOTE — ED PROVIDER NOTE - NSTIMEPROVIDERCAREINITIATE_GEN_ER
Continue Levothyroxine 75 mcg daily.    Orders:    TSH, 3rd generation with Free T4 reflex; Future    Ambulatory Referral to Endocrinology; Future     13-Nov-2022 23:22

## 2025-02-25 NOTE — PATIENT PROFILE ADULT - FUNCTIONAL ASSESSMENT - BASIC MOBILITY 5.
Pre-op: Left breast carcinoma    Post-op:  Same    Procedure:  Isotope and Lymphazurin injection  Bruni node biopsy with frozen section  Left lumpectomy with ultrasound localization and ultrasound specimen x-ray    Surgeon:  DEWEY Edouard M.D.AOMA    Assistant: Sofi    Anesthesia:  general    Indications: Left breast carcinoma       Procedure Details   After obtaining informed consent and with venous compression boots in place and receiving preoperative antibiotics the patient was taken to the operating room and placed under anesthesia.  In the retro-areola location approximately 500 µCi of filtered technetium labeled sulfur colloid were injected and the breast was massaged.  5 mL of Lymphazurin blue was then injected into the subcutaneous areola lymphatic plexus and the breast was again massaged.  The left breast and axilla were prepped and draped in a sterile fashion.    An incision was made parallel to the lateral aspect of the pectoralis major muscle and carried down into the axillary tissue.  With use of the visual of the blue dye, the neoprobe, and palpation, sentinel lymph nodes were identified and sent to pathology for frozen section evaluation. All sentinel nodes were deep axillary nodes. Hemostasis was obtained with the cautery and with hemoclips.  Following identification of the sentinel lymph nodes counts were obtained of the nodes and the tumor bed.  After obtaining hemostasis the incision was closed in a 2-layer closure of interrupted 3-0 Vicryl subdermal sutures, followed by 4-0 Vicryl subcutaneous suture.   Attention was then turned to the lumpectomy site.  Following ultrasound localization, an elliptical incision was made directly over the tumor and carried down into the breast tissue.  Dissection of the tumor was carried out circumferentially and the tumor mass was removed.  Marking stitch was placed at 12:00.  The incision was closed in several layers with deep 0 Vicryl sutures followed  by 3-0 Vicryl subdermal sutures and 4-0 subcuticular stitch. The lumpectomy was inked for orientation.   Steri-Strips, dressing, Surgi-Bra were placed.  Patient tolerated the procedure well and was taken to the recovery room in stable condition    Findings:  Tumor bed [id number]  Paul node #1, count of 513, negative for malignancy  Paul node #2, count 151, negative for malignancy  Paul node #3, count of 965, negative for malignancy  Paul node #4, count of 39, negative for malignancy    Curative intent:  yes  Tracers used in non-neoadjuvant setting          lymphazurin dye: Yes          radioactive tracer:  yes  Tracers used in the neoadjuvant setting          lymphazurin dye:  N/A          radioactive tracer:  N/A  All nodes present at the end of a dye filled channel removed: Yes  All significantly radioactive nodes were removed:   Yes  All palpable suspicious nodes removed:  Yes  Biopsy proven + nodes marked with clips prior to chemo removed:  N/A    Estimated Blood Loss:  Minimal    Blood administered:  none           Drains: None           Specimens:   ID Type Source Tests Collected by Time   A : SN #[id number] Tissue Paul Lymph Node TISSUE EXAM, [facility]) Divya Norwood MD 2/25/2025 0802   B : SN #2 151 Tissue Paul Lymph Node TISSUE EXAM, [facility] ([facility]) Divya Norwood MD 2/25/2025 0806   C : SN #3 965 Tissue Paul Lymph Node TISSUE EXAM, [facility] ([facility]) Divya Norwood MD 2/25/2025 0810   D : SN #4 39 Tissue Paul Lymph Node TISSUE EXAM, [facility]) Divya Norwood MD 2/25/2025 0815   E : left breast lumpectomy Tissue Breast, Left TISSUE EXAM, [facility] ([facility]) Divya Norwood MD 2/25/2025 0845        Grafts and Implants: None        Complications:  none           Disposition: PACU - hemodynamically stable.           Condition: stable     3 = A little assistance